# Patient Record
Sex: FEMALE | Race: BLACK OR AFRICAN AMERICAN
[De-identification: names, ages, dates, MRNs, and addresses within clinical notes are randomized per-mention and may not be internally consistent; named-entity substitution may affect disease eponyms.]

---

## 2017-01-10 ENCOUNTER — HOSPITAL ENCOUNTER (OUTPATIENT)
Dept: HOSPITAL 62 - END | Age: 72
Discharge: HOME | End: 2017-01-10
Attending: INTERNAL MEDICINE
Payer: MEDICARE

## 2017-01-10 VITALS — SYSTOLIC BLOOD PRESSURE: 128 MMHG | DIASTOLIC BLOOD PRESSURE: 67 MMHG

## 2017-01-10 DIAGNOSIS — I25.10: ICD-10-CM

## 2017-01-10 DIAGNOSIS — Z79.82: ICD-10-CM

## 2017-01-10 DIAGNOSIS — K29.50: Primary | ICD-10-CM

## 2017-01-10 DIAGNOSIS — J44.9: ICD-10-CM

## 2017-01-10 DIAGNOSIS — K44.9: ICD-10-CM

## 2017-01-10 DIAGNOSIS — I12.9: ICD-10-CM

## 2017-01-10 DIAGNOSIS — K21.9: ICD-10-CM

## 2017-01-10 DIAGNOSIS — Z88.0: ICD-10-CM

## 2017-01-10 DIAGNOSIS — Z79.899: ICD-10-CM

## 2017-01-10 DIAGNOSIS — N18.9: ICD-10-CM

## 2017-01-10 DIAGNOSIS — J45.909: ICD-10-CM

## 2017-01-10 DIAGNOSIS — Z79.51: ICD-10-CM

## 2017-01-10 DIAGNOSIS — E78.5: ICD-10-CM

## 2017-01-10 DIAGNOSIS — M19.90: ICD-10-CM

## 2017-01-10 PROCEDURE — 88342 IMHCHEM/IMCYTCHM 1ST ANTB: CPT

## 2017-01-10 PROCEDURE — 88305 TISSUE EXAM BY PATHOLOGIST: CPT

## 2017-01-10 PROCEDURE — 0DB68ZX EXCISION OF STOMACH, VIA NATURAL OR ARTIFICIAL OPENING ENDOSCOPIC, DIAGNOSTIC: ICD-10-PCS | Performed by: INTERNAL MEDICINE

## 2017-01-10 PROCEDURE — 43239 EGD BIOPSY SINGLE/MULTIPLE: CPT

## 2017-01-10 RX ADMIN — MIDAZOLAM HYDROCHLORIDE ONE MG: 1 INJECTION, SOLUTION INTRAMUSCULAR; INTRAVENOUS at 09:25

## 2017-01-10 RX ADMIN — MIDAZOLAM HYDROCHLORIDE ONE MG: 1 INJECTION, SOLUTION INTRAMUSCULAR; INTRAVENOUS at 09:29

## 2017-01-10 NOTE — OPERATIVE REPORT
Operative Report


DATE OF SURGERY: 01/10/17


Operative Report: 


The risks benefits and alternatives of the procedure explained to the patient 

in detail and informed consent is obtained that GIF Olympus video scope was 

inserted into the patient's mouth and hypopharynx the esophagus is identified 

intubated and insufflated the scope was then advanced through the esophagus 

stomach and duodenum retroflexion maneuver is done the esophagus stomach and 

first and second portions of the duodenum examined


PREOPERATIVE DIAGNOSIS: Epigastric pain


POSTOPERATIVE DIAGNOSIS: Gastritis.  Hiatal hernia


OPERATION: EGD with biopsy


SURGEON: VIRGINIA TRIMBLE


ANESTHESIA: Moderate Sedation - 3 mg of Versed, 50 g of fentanyl.


TISSUE REMOVED OR ALTERED: Gastric specimens obtained rule out Helicobacter 

pylori


COMPLICATIONS: 


None.


ESTIMATED BLOOD LOSS: none.


INTRAOPERATIVE FINDINGS: Normal esophagus.  Inflammation of the stomach.  First 

and second portions of the duodenum normal


PROCEDURE: 


Patient tolerated the procedure well.


No immediate postprocedure complications are noted.


She is discharged in good condition.


Date of discharge 01/10/2017.


Discharge diet: Regular.


Discharge activity: Regular.


Patient is instructed to go to emergency room or call the office should there 

be any further problems or questions.


2-3 week follow-up to discuss findings.


Wait on biopsies.

## 2017-02-17 ENCOUNTER — HOSPITAL ENCOUNTER (EMERGENCY)
Dept: HOSPITAL 62 - ER | Age: 72
Discharge: HOME | End: 2017-02-17
Payer: MEDICARE

## 2017-02-17 VITALS — SYSTOLIC BLOOD PRESSURE: 146 MMHG | DIASTOLIC BLOOD PRESSURE: 72 MMHG

## 2017-02-17 DIAGNOSIS — F17.210: ICD-10-CM

## 2017-02-17 DIAGNOSIS — J44.9: ICD-10-CM

## 2017-02-17 DIAGNOSIS — R05: ICD-10-CM

## 2017-02-17 DIAGNOSIS — I10: ICD-10-CM

## 2017-02-17 DIAGNOSIS — T59.811A: Primary | ICD-10-CM

## 2017-02-17 DIAGNOSIS — I25.10: ICD-10-CM

## 2017-02-17 DIAGNOSIS — I25.2: ICD-10-CM

## 2017-02-17 DIAGNOSIS — Z87.01: ICD-10-CM
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
## 2017-02-17 DIAGNOSIS — Z88.0: ICD-10-CM

## 2017-02-17 DIAGNOSIS — J70.5: ICD-10-CM

## 2017-02-17 DIAGNOSIS — R06.02: ICD-10-CM

## 2017-02-17 DIAGNOSIS — Z95.810: ICD-10-CM

## 2017-02-17 DIAGNOSIS — Z98.61: ICD-10-CM

## 2017-02-17 PROCEDURE — 99284 EMERGENCY DEPT VISIT MOD MDM: CPT

## 2017-02-17 PROCEDURE — 94640 AIRWAY INHALATION TREATMENT: CPT

## 2017-02-17 PROCEDURE — 71020: CPT
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
## 2017-05-21 ENCOUNTER — HOSPITAL ENCOUNTER (EMERGENCY)
Dept: HOSPITAL 62 - ER | Age: 72
Discharge: HOME | End: 2017-05-21
Payer: MEDICARE

## 2017-05-21 VITALS — SYSTOLIC BLOOD PRESSURE: 194 MMHG | DIASTOLIC BLOOD PRESSURE: 84 MMHG

## 2017-05-21 DIAGNOSIS — S09.90XA: Primary | ICD-10-CM
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
## 2017-05-21 DIAGNOSIS — M54.5: ICD-10-CM

## 2017-05-21 DIAGNOSIS — M25.551: ICD-10-CM

## 2017-05-21 DIAGNOSIS — M54.2: ICD-10-CM

## 2017-05-21 DIAGNOSIS — M54.9: ICD-10-CM

## 2017-05-21 PROCEDURE — 70450 CT HEAD/BRAIN W/O DYE: CPT

## 2017-05-21 PROCEDURE — 99284 EMERGENCY DEPT VISIT MOD MDM: CPT

## 2017-05-21 PROCEDURE — 73502 X-RAY EXAM HIP UNI 2-3 VIEWS: CPT

## 2017-05-21 PROCEDURE — 72110 X-RAY EXAM L-2 SPINE 4/>VWS: CPT

## 2017-05-21 PROCEDURE — 72125 CT NECK SPINE W/O DYE: CPT
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
## 2017-06-01 ENCOUNTER — HOSPITAL ENCOUNTER (OUTPATIENT)
Dept: HOSPITAL 62 - OD | Age: 72
End: 2017-06-01
Attending: FAMILY MEDICINE
Payer: MEDICARE

## 2017-06-01 DIAGNOSIS — M54.9: Primary | ICD-10-CM

## 2017-06-01 PROCEDURE — 72110 X-RAY EXAM L-2 SPINE 4/>VWS: CPT

## 2017-06-01 NOTE — RADIOLOGY REPORT (SQ)
EXAM DESCRIPTION:  LUMBAR SPINE COMPLETE



COMPLETED DATE/TIME:  6/1/2017 10:40 am



REASON FOR STUDY:  DORSALGIA,UNSPECIFIED M54.9  DORSALGIA, UNSPECIFIED



COMPARISON:  LUMBAR SPINE FILMS 5/21/2017, 5/3/2015



NUMBER OF VIEWS:  Five views including obliques.



TECHNIQUE:  AP, lateral, oblique, and sacral radiographic images acquired of the lumbar spine.



LIMITATIONS:  Artifact from electrode leads over the frontal film



FINDINGS:  MINERALIZATION: Osteopenic

SEGMENTATION: Normal.  No transitional anatomy.

ALIGNMENT: Normal.

VERTEBRAE: Maintained height.  No fracture or worrisome bone lesion.

DISCS: Preserved height.  No significant osteophytes or end plate irregularity.

POSTERIOR ELEMENTS: Very mild bilateral facet arthropathy at L5-S1

HARDWARE: None in the spine.

PARASPINAL SOFT TISSUES: Very heavily calcified abdominal aorta without calcified aortic aneurysm

PELVIS: Intact as visualized. No fractures or worrisome bone lesions. SI joints intact.

OTHER: No other significant finding.



IMPRESSION:  Osteopenia.

No acute fracture or malalignment

Mild bilateral L5-S1 facet arthropathy



TECHNICAL DOCUMENTATION:  JOB ID:  [id number]

 [employer]- All Rights Reserved

## 2017-08-04 ENCOUNTER — HOSPITAL ENCOUNTER (OUTPATIENT)
Dept: HOSPITAL 62 - OD | Age: 72
End: 2017-08-04
Attending: PHYSICIAN ASSISTANT
Payer: MEDICARE

## 2017-08-04 DIAGNOSIS — R05: Primary | ICD-10-CM

## 2017-08-04 PROCEDURE — 71020: CPT

## 2017-08-04 NOTE — RADIOLOGY REPORT (SQ)
EXAM DESCRIPTION:  CHEST PA/LATERAL



COMPLETED DATE/TIME:  8/4/2017 11:50 am



REASON FOR STUDY:  COUGH



COMPARISON:  2/17/2017



EXAM PARAMETERS:  NUMBER OF VIEWS: two views

TECHNIQUE: Digital Frontal and Lateral radiographic views of the chest acquired.

RADIATION DOSE: NA

LIMITATIONS: none



FINDINGS:  LUNGS AND PLEURA: No opacities, masses or pneumothorax. No pleural effusion.

MEDIASTINUM AND HILAR STRUCTURES: No masses or contour abnormalities.

HEART AND VASCULAR STRUCTURES: Heart normal size.  No evidence for failure.

BONES: No acute findings.

HARDWARE: Pacemaker/defibrillator.

OTHER: No other significant finding.



IMPRESSION:  NO SIGNIFICANT RADIOGRAPHIC FINDING IN THE CHEST.



TECHNICAL DOCUMENTATION:  JOB ID:  [id number]

 2011 [employer]- All Rights Reserved

## 2017-08-23 ENCOUNTER — HOSPITAL ENCOUNTER (EMERGENCY)
Dept: HOSPITAL 62 - ER | Age: 72
Discharge: HOME | End: 2017-08-23
Payer: MEDICARE

## 2017-08-23 VITALS — DIASTOLIC BLOOD PRESSURE: 67 MMHG | SYSTOLIC BLOOD PRESSURE: 136 MMHG

## 2017-08-23 DIAGNOSIS — I25.2: ICD-10-CM

## 2017-08-23 DIAGNOSIS — Z87.01: ICD-10-CM

## 2017-08-23 DIAGNOSIS — R94.31: ICD-10-CM

## 2017-08-23 DIAGNOSIS — R06.02: ICD-10-CM

## 2017-08-23 DIAGNOSIS — I25.10: ICD-10-CM

## 2017-08-23 DIAGNOSIS — F17.200: ICD-10-CM

## 2017-08-23 DIAGNOSIS — R07.9: Primary | ICD-10-CM

## 2017-08-23 DIAGNOSIS — I10: ICD-10-CM

## 2017-08-23 DIAGNOSIS — J44.9: ICD-10-CM

## 2017-08-23 DIAGNOSIS — Z95.0: ICD-10-CM

## 2017-08-23 DIAGNOSIS — Z98.890: ICD-10-CM

## 2017-08-23 DIAGNOSIS — Z98.61: ICD-10-CM

## 2017-08-23 DIAGNOSIS — Z88.0: ICD-10-CM

## 2017-08-23 LAB
ALBUMIN SERPL-MCNC: 3.8 G/DL (ref 3.5–5)
ALP SERPL-CCNC: 87 U/L (ref 38–126)
ALT SERPL-CCNC: 22 U/L (ref 9–52)
ANION GAP SERPL CALC-SCNC: 10 MMOL/L (ref 5–19)
AST SERPL-CCNC: 15 U/L (ref 14–36)
BASOPHILS # BLD AUTO: 0 10^3/UL (ref 0–0.2)
BASOPHILS NFR BLD AUTO: 0.7 % (ref 0–2)
BILIRUB DIRECT SERPL-MCNC: 0.3 MG/DL (ref 0–0.4)
BILIRUB SERPL-MCNC: 0.6 MG/DL (ref 0.2–1.3)
BUN SERPL-MCNC: 22 MG/DL (ref 7–20)
CALCIUM: 9.7 MG/DL (ref 8.4–10.2)
CHLORIDE SERPL-SCNC: 108 MMOL/L (ref 98–107)
CK MB SERPL-MCNC: 0.42 NG/ML (ref ?–4.55)
CK MB SERPL-MCNC: 0.44 NG/ML (ref ?–4.55)
CK SERPL-CCNC: 44 U/L (ref 30–135)
CO2 SERPL-SCNC: 23 MMOL/L (ref 22–30)
CREAT SERPL-MCNC: 0.94 MG/DL (ref 0.52–1.25)
EOSINOPHIL # BLD AUTO: 0 10^3/UL (ref 0–0.6)
EOSINOPHIL NFR BLD AUTO: 0 % (ref 0–6)
ERYTHROCYTE [DISTWIDTH] IN BLOOD BY AUTOMATED COUNT: 15.1 % (ref 11.5–14)
GLUCOSE SERPL-MCNC: 103 MG/DL (ref 75–110)
HCT VFR BLD CALC: 35.5 % (ref 36–47)
HGB BLD-MCNC: 11.8 G/DL (ref 12–15.5)
HGB HCT DIFFERENCE: -0.1
LYMPHOCYTES # BLD AUTO: 1.1 10^3/UL (ref 0.5–4.7)
LYMPHOCYTES NFR BLD AUTO: 20.3 % (ref 13–45)
MCH RBC QN AUTO: 30.8 PG (ref 27–33.4)
MCHC RBC AUTO-ENTMCNC: 33.2 G/DL (ref 32–36)
MCV RBC AUTO: 93 FL (ref 80–97)
MONOCYTES # BLD AUTO: 0.2 10^3/UL (ref 0.1–1.4)
MONOCYTES NFR BLD AUTO: 3.5 % (ref 3–13)
NEUTROPHILS # BLD AUTO: 4.1 10^3/UL (ref 1.7–8.2)
NEUTS SEG NFR BLD AUTO: 75.5 % (ref 42–78)
POTASSIUM SERPL-SCNC: 3.6 MMOL/L (ref 3.6–5)
PROT SERPL-MCNC: 6.4 G/DL (ref 6.3–8.2)
RBC # BLD AUTO: 3.83 10^6/UL (ref 3.72–5.28)
SODIUM SERPL-SCNC: 140.9 MMOL/L (ref 137–145)
TROPONIN I SERPL-MCNC: < 0.012 NG/ML
TROPONIN I SERPL-MCNC: < 0.012 NG/ML
WBC # BLD AUTO: 5.5 10^3/UL (ref 4–10.5)

## 2017-08-23 PROCEDURE — 36415 COLL VENOUS BLD VENIPUNCTURE: CPT

## 2017-08-23 PROCEDURE — 99285 EMERGENCY DEPT VISIT HI MDM: CPT

## 2017-08-23 PROCEDURE — 80053 COMPREHEN METABOLIC PANEL: CPT

## 2017-08-23 PROCEDURE — 93005 ELECTROCARDIOGRAM TRACING: CPT

## 2017-08-23 PROCEDURE — 84484 ASSAY OF TROPONIN QUANT: CPT

## 2017-08-23 PROCEDURE — 71010: CPT

## 2017-08-23 PROCEDURE — 82553 CREATINE MB FRACTION: CPT

## 2017-08-23 PROCEDURE — 85025 COMPLETE CBC W/AUTO DIFF WBC: CPT

## 2017-08-23 PROCEDURE — 93010 ELECTROCARDIOGRAM REPORT: CPT

## 2017-08-23 PROCEDURE — 82550 ASSAY OF CK (CPK): CPT

## 2017-08-23 NOTE — RADIOLOGY REPORT (SQ)
EXAM DESCRIPTION:  CHEST SINGLE VIEW



COMPLETED DATE/TIME:  8/23/2017 10:39 am



REASON FOR STUDY:  bed 16 cp



COMPARISON:  Two-view chest 8/4/2017, 2/17/2017, 5/21/2016



EXAM PARAMETERS:  NUMBER OF VIEWS: One view.

TECHNIQUE: Single frontal radiographic view of the chest acquired.

RADIATION DOSE: NA

LIMITATIONS: Left mid lung obscured by the pacemaker/defibrillator battery pack



FINDINGS:  LUNGS AND PLEURA: No opacities, masses or pneumothorax. No pleural effusion.

MEDIASTINUM AND HILAR STRUCTURES: No masses.  Contour normal.

HEART AND VASCULAR STRUCTURES: No gross cardiomegaly

BONES: No acute findings.

HARDWARE: Unchanged left-sided pacemaker/ defibrillator and abandoned leads.  LAD coronary stent.

OTHER: No other significant finding.



IMPRESSION:  No acute findings



TECHNICAL DOCUMENTATION:  JOB ID:  [id number]

## 2017-08-23 NOTE — ER DOCUMENT REPORT
ED Cardiac





- General


Stated Complaint: CHEST PAIN


Time Seen by Provider: 17 10:40


Notes: 





Patient was at a local gastroenterologist (Dr. Lindsey) office this morning 

undergoing a colonoscopy.  She was sedated with etomidate and propofol.  When 

she awakened from the procedure, she was complaining of pain in her upper left 

breast and shoulder, into her left neck.  She says the doctor present gave her 

2 sublingual nitroglycerin and they called EMS.  She says the nitroglycerin 

helped some, although she still has some chest pain at this time.  Patient also 

feels a little bit short of breath.  Patient has a history of a heart attack in 

 and received a pacemaker at that time.  She still has the pacemaker, the 

most recently moved about 5 years ago.  Denies any recent illness or chest 

pains.  Denies any nausea or vomiting.  Denies fever.





PMH: Hypertension, coronary artery disease, hysterectomy, smoker.


Patient of Dr. Caballero.


TRAVEL OUTSIDE OF THE [place] IN LAST 30 DAYS: No





- Related Data


Allergies/Adverse Reactions: 


 





amoxicillin [Amoxicillin] Allergy (Intermediate, Verified 17 16:46)


 Hallucinations


Penicillins Allergy (Intermediate, Verified 17 16:46)


 Hallucinations











Past Medical History





- Social History


Smoking Status: Current Every Day Smoker


Family History: Reviewed & Not Pertinent, Hypertension





- Past Medical History


Cardiac Medical History: Reports: Hx Congestive Heart Failure, Hx Coronary 

Artery Disease, Hx Heart Attack - , Hx Hypercholesterolemia, Hx 

Hypertension - medicated


Pulmonary Medical History: Reports: Hx Bronchitis, Hx COPD, Hx Pneumonia


Renal/ Medical History: Reports: Hx Renal Insufficiency


Malignancy Medical History: Denies: Hx Leukemia, Hx Lung Cancer


GI Medical History: Reports: Hx Crohn's Disease, Hx Diverticulitis, Hx 

Gastroesophageal Reflux Disease


Musculoskeltal Medical History: Reports Hx Arthritis


Psychiatric Medical History: Reports: Hx Depression


Traumatic Medical History: Reports: Hx Fractures - right wrist, left foot


Past Surgical History: Reports: Hx Appendectomy, Hx Cardiac Catheterization, Hx 

Cardiac Surgery - AICD, Hx  Section - x4, Hx Coronary Stent, Hx 

Hysterectomy, Hx Orthopedic Surgery - bilateral knee surgery, Hx Pacemaker, Hx 

Tonsillectomy





- Immunizations


Immunizations up to date: Yes


Hx Diphtheria, Pertussis, Tetanus Vaccination: Yes


Hx Pneumococcal Vaccination: 01/01/10





Review of Systems





- Review of Systems


Notes: 





REVIEW OF SYSTEMS:


CONSTITUTIONAL :  Denies fever.  


EENT:   Denies eye, ear, nose or mouth or throat pain or other symptoms.


CARDIOVASCULAR: See HPI.


RESPIRATORY:  Denies cough, chest congestion, but did feel some shortness of 

breath.


GASTROINTESTINAL:  Denies abdominal pain or nausea, vomiting, or diarrhea (

except for the GI prep for the colonoscopy).


GENITOURINARY:  Denies difficulty or painful urinating, urinary frequency, 

blood in urine.


MUSCULOSKELETAL:  Denies back or neck pain.  Denies joint pain or swelling.


SKIN:   Denies rash or skin lesions.


NEUROLOGICAL:  Denies LOC or altered mental status.  Denies headache.  Denies 

sensory loss or motor deficits.





ALL OTHER SYSTEMS REVIEWED AND NEGATIVE.





Physical Exam





- Vital signs


Vitals: 


 











Resp Pulse Ox


 


 17   97 


 


 17 10:25  17 10:25











Interpretation: Normal, Hypertensive - 164/70





- Notes


Notes: 





PHYSICAL EXAMINATION:





GENERAL: Well-appearing, in no acute distress.  Vital signs are normal except 

for the blood pressure 164/70.





HEAD: Atraumatic, normocephalic.





NECK: Normal range of motion, supple.





LUNGS: Breath sounds clear and equal bilaterally.  Pacemaker in upper left 

chest.  Mild tenderness around the pacer.





HEART: Regular rate and rhythm without murmurs.





ABDOMEN: Soft, nontender.  No guarding or rebound.





BACK:  No tenderness throughout entire back.





EXTREMITIES: Normal range of motion without pain.





NEUROLOGICAL:  Normal speech, normal gait.  Normal sensory, motor, and reflex 

exams.  Awake, alert, and oriented x3.  Cranial nerves normal.





PSYCH: Normal mood, normal affect.





SKIN: Warm, dry, no rashes.





Course





- Re-evaluation


Re-evalutation: 





17 13:30


Patient was given a 0.2 mg nitro patch and says it helped her pain and she is 

down to barely noticeable discomfort now.








17 13:38


Discussed patient with Dr. Caballero, her primary care physician, and he says this 

patient has frequent chest pain, that always has a normal workup and no heart 

injury.  He requests a second set of cardiac markers and dispo to follow up 

with him in the office unless there is a change in her second set of labs.  

Patient advised.  Patient is hungry and we have gotten her lunch and she is 

eating it now.








17 17:34


Repeat labs show no change in the patient's CK-MB and troponin levels.  She is 

asymptomatic at this time.  I feel she is safe to be discharged home to follow-

up with Dr. Caballero.





- Vital Signs


Vital signs: 


 











Temp Pulse Resp BP Pulse Ox


 


       14   164/70 H  98 


 


       17 11:00  17 10:26  17 11:00














- Laboratory


Result Diagrams: 


 17 11:20





 17 11:20


Laboratory results interpreted by me: 


 











  17





  11:20 11:20


 


Hgb  11.8 L 


 


Hct  35.5 L 


 


RDW  15.1 H 


 


Chloride   108 H


 


BUN   22 H


 


Est GFR (Non-Af Amer)   58 L














- Diagnostic Test


Radiology results interpreted by me: 





17 13:27


Chest x-ray shows a pacemaker.  Otherwise, chest x-ray is essentially normal.





- EKG Interpretation by Me


EKG shows normal: Sinus rhythm


Rate: Normal


Rhythm: NSR


Additional EKG results interpreted by me: 





17 13:27


EKG shows an abnormal T-wave in what looks like QT prolongation.  No acute 

changes, otherwise.





Discharge





- Discharge


Clinical Impression: 


Chest pain


Qualifiers:


 Chest pain type: unspecified Qualified Code(s): R07.9 - Chest pain, unspecified





Condition: Stable


Disposition: HOME, SELF-CARE


Additional Instructions: 


CHEST PAIN OF UNCLEAR CAUSE:





     The exact cause of your chest pain isn't clear.  Fortunately, there is no 

evidence of a dangerous medical condition.  Further testing may be required to 

find the source of the pain.


     Most often, we find that this pain is coming from the chest wall -- the 

muscles or rib joints in the chest.  But chest pain can come from the lung and 

lung lining, the esophagus, the heart valves or heart lining, and even the 

stomach or gallbladder.


     Rest.  Eat lightly until the pain is gone.  We may prescribe medicine for 

pain and inflammation.


     You should call the physician immediately if the pain radiates to the 

shoulder, jaw or arms; if you start to run a fever or develop a cough; or if 

you develop shortness of breath, or other new or alarming symptoms.








NORMAL EXAM AND WORKUP:


     At this time, your examination and workup show no significant abnormality.

  No significant abnormal physical findings were noted.  All laboratory, EKG, 

and imaging (x-ray, CT scans, ultrasound) studies that were ordered show no 

significant abnormality.


     Although your examination and all studies that were ordered showed no 

significant abnormal finding, there are no examinations and no studies that are 

100% accurate.  There is always the possibility that some abnormality could 

exist and not be detected with physical examination or within the limits and 

capabilities of laboratory and other studies.


     You should return or follow up as you were instructed on your visit today 

for further evaluation if your symptoms do not resolve.








FOLLOW-UP CARE:


If you have been referred to a physician for follow-up care, call the physician

s office for an appointment as you were instructed or within the next two days.

  If you experience worsening or a significant change in your symptoms, notify 

the physician immediately or return to the Emergency Department at any time for 

re-evaluation.





Follow-up with Dr. Caballero in his office tomorrow.


Referrals: 


LAZARO CABALLERO MD [Primary Care Provider] - Follow up as needed

## 2017-08-23 NOTE — EKG REPORT
SEVERITY:- ABNORMAL ECG -

SINUS RHYTHM

ABNORMAL T, CONSIDER ISCHEMIA, ANT-LAT LEADS

:

Confirmed by: Lety Pablo 23-Aug-2017 21:40:55

## 2017-10-08 ENCOUNTER — HOSPITAL ENCOUNTER (INPATIENT)
Dept: HOSPITAL 62 - ER | Age: 72
LOS: 3 days | Discharge: HOME | DRG: 378 | End: 2017-10-11
Attending: FAMILY MEDICINE | Admitting: FAMILY MEDICINE
Payer: MEDICARE

## 2017-10-08 DIAGNOSIS — I25.10: ICD-10-CM

## 2017-10-08 DIAGNOSIS — Z88.0: ICD-10-CM

## 2017-10-08 DIAGNOSIS — J44.9: ICD-10-CM

## 2017-10-08 DIAGNOSIS — I25.2: ICD-10-CM

## 2017-10-08 DIAGNOSIS — M19.90: ICD-10-CM

## 2017-10-08 DIAGNOSIS — D62: ICD-10-CM

## 2017-10-08 DIAGNOSIS — R51: ICD-10-CM

## 2017-10-08 DIAGNOSIS — I11.0: ICD-10-CM

## 2017-10-08 DIAGNOSIS — Z79.82: ICD-10-CM

## 2017-10-08 DIAGNOSIS — E78.5: ICD-10-CM

## 2017-10-08 DIAGNOSIS — Z88.1: ICD-10-CM

## 2017-10-08 DIAGNOSIS — F17.210: ICD-10-CM

## 2017-10-08 DIAGNOSIS — Z79.51: ICD-10-CM

## 2017-10-08 DIAGNOSIS — G89.4: ICD-10-CM

## 2017-10-08 DIAGNOSIS — Z79.899: ICD-10-CM

## 2017-10-08 DIAGNOSIS — K21.9: ICD-10-CM

## 2017-10-08 DIAGNOSIS — Z95.810: ICD-10-CM

## 2017-10-08 DIAGNOSIS — K55.21: Primary | ICD-10-CM

## 2017-10-08 DIAGNOSIS — I50.9: ICD-10-CM

## 2017-10-08 PROCEDURE — 85027 COMPLETE CBC AUTOMATED: CPT

## 2017-10-08 PROCEDURE — 84484 ASSAY OF TROPONIN QUANT: CPT

## 2017-10-08 PROCEDURE — 93010 ELECTROCARDIOGRAM REPORT: CPT

## 2017-10-08 PROCEDURE — 81001 URINALYSIS AUTO W/SCOPE: CPT

## 2017-10-08 PROCEDURE — 86920 COMPATIBILITY TEST SPIN: CPT

## 2017-10-08 PROCEDURE — 36415 COLL VENOUS BLD VENIPUNCTURE: CPT

## 2017-10-08 PROCEDURE — 85610 PROTHROMBIN TIME: CPT

## 2017-10-08 PROCEDURE — 45378 DIAGNOSTIC COLONOSCOPY: CPT

## 2017-10-08 PROCEDURE — 85730 THROMBOPLASTIN TIME PARTIAL: CPT

## 2017-10-08 PROCEDURE — 70450 CT HEAD/BRAIN W/O DYE: CPT

## 2017-10-08 PROCEDURE — 80053 COMPREHEN METABOLIC PANEL: CPT

## 2017-10-08 PROCEDURE — 80048 BASIC METABOLIC PNL TOTAL CA: CPT

## 2017-10-08 PROCEDURE — 85025 COMPLETE CBC W/AUTO DIFF WBC: CPT

## 2017-10-08 PROCEDURE — P9016 RBC LEUKOCYTES REDUCED: HCPCS

## 2017-10-08 PROCEDURE — 86901 BLOOD TYPING SEROLOGIC RH(D): CPT

## 2017-10-08 PROCEDURE — 86850 RBC ANTIBODY SCREEN: CPT

## 2017-10-08 PROCEDURE — 94640 AIRWAY INHALATION TREATMENT: CPT

## 2017-10-08 PROCEDURE — 86900 BLOOD TYPING SEROLOGIC ABO: CPT

## 2017-10-08 PROCEDURE — S0164 INJECTION PANTROPRAZOLE: HCPCS

## 2017-10-08 PROCEDURE — 82550 ASSAY OF CK (CPK): CPT

## 2017-10-08 PROCEDURE — 43235 EGD DIAGNOSTIC BRUSH WASH: CPT

## 2017-10-08 PROCEDURE — 93005 ELECTROCARDIOGRAM TRACING: CPT

## 2017-10-08 PROCEDURE — 87070 CULTURE OTHR SPECIMN AEROBIC: CPT

## 2017-10-08 PROCEDURE — 87880 STREP A ASSAY W/OPTIC: CPT

## 2017-10-08 PROCEDURE — 99285 EMERGENCY DEPT VISIT HI MDM: CPT

## 2017-10-08 PROCEDURE — 36430 TRANSFUSION BLD/BLD COMPNT: CPT

## 2017-10-09 LAB
ALBUMIN SERPL-MCNC: 3.9 G/DL (ref 3.5–5)
ALP SERPL-CCNC: 93 U/L (ref 38–126)
ALT SERPL-CCNC: 18 U/L (ref 9–52)
ANION GAP SERPL CALC-SCNC: 9 MMOL/L (ref 5–19)
APPEARANCE UR: CLEAR
APTT BLD: 29.9 SEC (ref 23.5–35.8)
AST SERPL-CCNC: 55 U/L (ref 14–36)
BASOPHILS # BLD AUTO: 0 10^3/UL (ref 0–0.2)
BASOPHILS NFR BLD AUTO: 0.5 % (ref 0–2)
BILIRUB DIRECT SERPL-MCNC: 0.4 MG/DL (ref 0–0.4)
BILIRUB SERPL-MCNC: 0.6 MG/DL (ref 0.2–1.3)
BILIRUB UR QL STRIP: NEGATIVE
BUN SERPL-MCNC: 21 MG/DL (ref 7–20)
CALCIUM: 9.4 MG/DL (ref 8.4–10.2)
CHLORIDE SERPL-SCNC: 112 MMOL/L (ref 98–107)
CK SERPL-CCNC: 83 U/L (ref 30–135)
CO2 SERPL-SCNC: 24 MMOL/L (ref 22–30)
CREAT SERPL-MCNC: 1.08 MG/DL (ref 0.52–1.25)
EOSINOPHIL # BLD AUTO: 0 10^3/UL (ref 0–0.6)
EOSINOPHIL NFR BLD AUTO: 1.3 % (ref 0–6)
ERYTHROCYTE [DISTWIDTH] IN BLOOD BY AUTOMATED COUNT: 15.5 % (ref 11.5–14)
ERYTHROCYTE [DISTWIDTH] IN BLOOD BY AUTOMATED COUNT: 15.7 % (ref 11.5–14)
GLUCOSE SERPL-MCNC: 125 MG/DL (ref 75–110)
GLUCOSE UR STRIP-MCNC: NEGATIVE MG/DL
HCT VFR BLD CALC: 24.1 % (ref 36–47)
HCT VFR BLD CALC: 24.6 % (ref 36–47)
HCT VFR BLD CALC: 27.1 % (ref 36–47)
HCT VFR BLD CALC: 35 % (ref 36–47)
HGB BLD-MCNC: 11.5 G/DL (ref 12–15.5)
HGB BLD-MCNC: 8.1 G/DL (ref 12–15.5)
HGB BLD-MCNC: 8.1 G/DL (ref 12–15.5)
HGB BLD-MCNC: 9 G/DL (ref 12–15.5)
HGB HCT DIFFERENCE: -0.1
HGB HCT DIFFERENCE: -0.3
HGB HCT DIFFERENCE: -0.5
HGB HCT DIFFERENCE: 0.2
KETONES UR STRIP-MCNC: NEGATIVE MG/DL
LYMPHOCYTES # BLD AUTO: 1.9 10^3/UL (ref 0.5–4.7)
LYMPHOCYTES NFR BLD AUTO: 58.6 % (ref 13–45)
MCH RBC QN AUTO: 30.8 PG (ref 27–33.4)
MCH RBC QN AUTO: 30.8 PG (ref 27–33.4)
MCH RBC QN AUTO: 31 PG (ref 27–33.4)
MCH RBC QN AUTO: 31.6 PG (ref 27–33.4)
MCHC RBC AUTO-ENTMCNC: 32.9 G/DL (ref 32–36)
MCHC RBC AUTO-ENTMCNC: 32.9 G/DL (ref 32–36)
MCHC RBC AUTO-ENTMCNC: 33.1 G/DL (ref 32–36)
MCHC RBC AUTO-ENTMCNC: 33.6 G/DL (ref 32–36)
MCV RBC AUTO: 94 FL (ref 80–97)
MONOCYTES # BLD AUTO: 0.1 10^3/UL (ref 0.1–1.4)
MONOCYTES NFR BLD AUTO: 4 % (ref 3–13)
NEUTROPHILS # BLD AUTO: 1.2 10^3/UL (ref 1.7–8.2)
NEUTS SEG NFR BLD AUTO: 35.6 % (ref 42–78)
NITRITE UR QL STRIP: NEGATIVE
PH UR STRIP: 5 [PH] (ref 5–9)
POTASSIUM SERPL-SCNC: 3.8 MMOL/L (ref 3.6–5)
PROT SERPL-MCNC: 6.6 G/DL (ref 6.3–8.2)
PROT UR STRIP-MCNC: NEGATIVE MG/DL
PROTHROMBIN TIME: 14.2 SEC (ref 11.4–15.4)
RBC # BLD AUTO: 2.56 10^6/UL (ref 3.72–5.28)
RBC # BLD AUTO: 2.63 10^6/UL (ref 3.72–5.28)
RBC # BLD AUTO: 2.89 10^6/UL (ref 3.72–5.28)
RBC # BLD AUTO: 3.73 10^6/UL (ref 3.72–5.28)
SODIUM SERPL-SCNC: 145.4 MMOL/L (ref 137–145)
SP GR UR STRIP: 1.02
UROBILINOGEN UR-MCNC: NEGATIVE MG/DL (ref ?–2)
WBC # BLD AUTO: 3 10^3/UL (ref 4–10.5)
WBC # BLD AUTO: 3.2 10^3/UL (ref 4–10.5)
WBC # BLD AUTO: 3.6 10^3/UL (ref 4–10.5)
WBC # BLD AUTO: 3.7 10^3/UL (ref 4–10.5)

## 2017-10-09 PROCEDURE — 0DJ08ZZ INSPECTION OF UPPER INTESTINAL TRACT, VIA NATURAL OR ARTIFICIAL OPENING ENDOSCOPIC: ICD-10-PCS | Performed by: SURGERY

## 2017-10-09 PROCEDURE — 0DJD8ZZ INSPECTION OF LOWER INTESTINAL TRACT, VIA NATURAL OR ARTIFICIAL OPENING ENDOSCOPIC: ICD-10-PCS | Performed by: SURGERY

## 2017-10-09 RX ADMIN — ATORVASTATIN CALCIUM SCH MG: 40 TABLET, FILM COATED ORAL at 23:08

## 2017-10-09 RX ADMIN — BUSPIRONE HYDROCHLORIDE SCH MG: 10 TABLET ORAL at 23:08

## 2017-10-09 RX ADMIN — HYDRALAZINE HYDROCHLORIDE SCH MG: 25 TABLET, FILM COATED ORAL at 13:37

## 2017-10-09 RX ADMIN — PANTOPRAZOLE SODIUM SCH MG: 40 INJECTION, POWDER, FOR SOLUTION INTRAVENOUS at 10:16

## 2017-10-09 RX ADMIN — FENTANYL CITRATE ONE MCG: 50 INJECTION INTRAMUSCULAR; INTRAVENOUS at 17:23

## 2017-10-09 RX ADMIN — FENTANYL CITRATE ONE MCG: 50 INJECTION INTRAMUSCULAR; INTRAVENOUS at 17:30

## 2017-10-09 RX ADMIN — AMLODIPINE BESYLATE SCH MG: 5 TABLET ORAL at 23:09

## 2017-10-09 RX ADMIN — BUDESONIDE AND FORMOTEROL FUMARATE DIHYDRATE SCH PUFF: 160; 4.5 AEROSOL RESPIRATORY (INHALATION) at 23:08

## 2017-10-09 RX ADMIN — BUSPIRONE HYDROCHLORIDE SCH MG: 10 TABLET ORAL at 13:44

## 2017-10-09 RX ADMIN — PANTOPRAZOLE SODIUM SCH MG: 40 INJECTION, POWDER, FOR SOLUTION INTRAVENOUS at 23:09

## 2017-10-09 RX ADMIN — MIDAZOLAM HYDROCHLORIDE ONE MG: 1 INJECTION, SOLUTION INTRAMUSCULAR; INTRAVENOUS at 17:28

## 2017-10-09 RX ADMIN — MIDAZOLAM HYDROCHLORIDE ONE MG: 1 INJECTION, SOLUTION INTRAMUSCULAR; INTRAVENOUS at 17:21

## 2017-10-09 RX ADMIN — CARVEDILOL SCH MG: 12.5 TABLET, FILM COATED ORAL at 23:09

## 2017-10-09 RX ADMIN — MIDAZOLAM HYDROCHLORIDE ONE MG: 1 INJECTION, SOLUTION INTRAMUSCULAR; INTRAVENOUS at 17:40

## 2017-10-09 RX ADMIN — FLUTICASONE PROPIONATE SCH SPRAY: 50 SPRAY, METERED NASAL at 19:09

## 2017-10-09 RX ADMIN — HYDRALAZINE HYDROCHLORIDE SCH MG: 25 TABLET, FILM COATED ORAL at 23:08

## 2017-10-09 RX ADMIN — LATANOPROST SCH DROP: 50 SOLUTION OPHTHALMIC at 23:12

## 2017-10-09 RX ADMIN — RANOLAZINE SCH MG: 500 TABLET, FILM COATED, EXTENDED RELEASE ORAL at 23:05

## 2017-10-09 NOTE — PDOC H&P
History of Present Illness


Admission Date/PCP: 


  10/09/17 07:08





  BISMARK CABALLERO MD





Patient complains of: Blood in the rectum


History of Present Illness: 


JAYCEE FLOWERS is a 72 year old female presents to the emergency department by 

ground rescue complaining of abdominal, gastrointestinal bleeding.  She was 

admitted to the internal medicine service for further evaluation.  She had no 

evidence of hemodynamic instability.  Bowel movements are described as bloody 

some clot.





Patient has a history of repeated gastrointestinal bleeding.  According to 

records, she underwent upper and lower endoscopy by Dr. Cantor  where she 

was found to have duodenitis, ileocolitis and 2 polyps of the ascending colon.  

She underwent repeat upper endoscopy by Dr. Almeida was found to have gastritis 

and duodenitis in .  Significant source of gastrointestinal bleeding was 

identified.  Last month the patient states she underwent upper and lower 

endoscopy by Dr. eason, the results of which are unknown.





According the patient the only blood thinner she takes his aspirin.  Since 

being admitted to the floor, she has had no further gastrointestinal bleeding.  

She denies emesis.


Patient also have a history of the coronary artery disease status post stent 

placement and a history of the ventricular tachycardia status post ICD 

placement and currently see a [facility] and currently all stable


Patient's still have a blood in the stools


pt was diagnosed one time of the Crohn's disease but the doctor ivanna rivera told 

the patient patient does not have any Crohn's disease


Patient's son is on the bedside and discussed with the . Maco Was going to 

perform the endoscopy and colonoscopy today








Past Medical History


Cardiac Medical History: Reports: Congestive Heart Failure, Coronary Artery 

Disease, Myocardial Infarction - , Hyperlipidema, Hypertension - medicated


   Denies: Atrial Fibrillation, Peripheral Vascular Disease, Pulmonary Embolism

, Heart Murmur


Pulmonary Medical History: Reports: Bronchitis, Chronic Obstructive Pulmonary 

Disease (COPD), Pneumonia


   Denies: Asthma, Respiratory Failure, Sleep Apnea, Tuberculosis


Neurological Medical History: 


   Denies: Seizures


Endocrine Medical History: 


   Denies: Diabetes Mellitus Type 1, Diabetes Mellitus Type 2


Renal/ Medical History: 


   Denies: End Stage Renal Disease


Malignancy Medical History: 


   Denies: Leukemia, Lung Cancer


GI Medical History: Reports: Crohn's Disease - Patient has either Crohn's 

disease or ulcerative colitis, Diverticulitis, Gastroesophageal Reflux Disease


   Denies: Hepatitis, Hiatal Hernia


Musculoskeltal Medical History: Reports: Arthritis


   Denies: Fibromyalgia


Psychiatric Medical History: Reports: Depression


   Denies: Bipolar Disorder, Post Traumatic Stress Disorder


Hematology: 


   Denies: Anemia, Hemophilia, Sickle Cell Disease


Infectious Medical History: 


   Denies: HIV





Past Surgical History


Past Surgical History: Reports: Appendectomy, Cardiac Catheterization,  

Section - x4, Coronary Stent, Hysterectomy, Orthopedic Surgery - bilateral knee 

surgery, Pacemaker, Tonsillectomy


   Denies: Amputation, Cholecystectomy, Colostomy, Coronary Artery Bypass Graft

, Gastric Bypass Surgery, Herniorrhaphy, Mastectomy, Tubal Ligation





Social History


Lives with: Family


Smoking Status: Current Some Day Smoker


Frequency of Alcohol Use: None


Hx Recreational Drug Use: No


Hx Prescription Drug Abuse: No





- Advance Directive


Resuscitation Status: Full Code





Family History


Family History: Reviewed & Not Pertinent, Hypertension


Parental Family History Reviewed: Yes


Children Family History Reviewed: Yes


Sibling(s) Family History Reviewed.: Yes





Medication/Allergy


Home Medications: 








Albuterol Sulfate [Albuterol Sulfate 2.5mg/3 mL] 1 vial IH TID 10/09/17 


Alprazolam [Xanax 0.5 mg Tablet] 0.5 mg PO BIDP PRN 10/09/17 


Amlodipine Besylate [Norvasc 5 mg Tablet] 5 mg PO Q12 10/09/17 


Aspirin [Aspirin EC] 81 mg PO DAILY 10/09/17 


Atorvastatin Calcium [Lipitor 40 mg Tablet] 40 mg PO QHS 10/09/17 


Budesonide/Formoterol Fumarate [Symbicort Hfa 160-4.5 Mcg Inhaler 6 gm] 2 puff 

IH Q12 10/09/17 


Buspirone HCl [Buspar 10 mg Tablet] 10 mg PO Q8 10/09/17 


Carvedilol [Coreg 25 mg Tablet] 1 tab PO Q12 10/09/17 


Diclofenac Sodium [Voltaren] 100 gm TP QIDP PRN 10/09/17 


Escitalopram Oxalate [Lexapro 10 mg Tablet] 10 mg PO DAILY 10/09/17 


Fluticasone Propionate [Flonase Nasal Spray 50 Mcg/Spray 16 gm] 1 spray NASL 

BID 10/09/17 


Gabapentin [Neurontin 300 mg Capsule] 300 mg PO BIDP PRN 10/09/17 


Hydralazine HCl [Apresoline 25 mg Tablet] 50 mg PO Q8 10/09/17 


Hydrochlorothiazide [Hydrodiuril 12.5 mg Capsule] 12.5 mg PO DAILY 10/09/17 


Ketorolac Tromethamine [Acular Ls] 1 drop OS TIDP PRN 10/09/17 


Latanoprost [Xalatan 0.005% Oph Soln 2.5 ml] 1 drop OD QHS 10/09/17 


Nitroglycerin [Nitrostat 0.4 mg (1/150 Gr) Tabs 25/Bottle] 1 tab SL Q5MP PRN 10/

09/17 


Omeprazole 40 mg PO QAM 10/09/17 


Ranitidine HCl [Zantac 150 mg Tablet] 75 mg PO BID 10/09/17 


Ranolazine [Ranexa 500 mg Tab.sr] 500 mg PO Q12 10/09/17 


Tiotropium Bromide [Spiriva Respimat] 2 puff IH DAILY 10/09/17 








Allergies/Adverse Reactions: 


 





amoxicillin [Amoxicillin] Allergy (Intermediate, Verified 17 16:46)


 Hallucinations


Penicillins Allergy (Intermediate, Verified 17 16:46)


 Hallucinations











Review of Systems


Constitutional: ABSENT: chills, fever(s), headache(s), weight gain, weight loss


Eyes: ABSENT: visual disturbances


Ears: ABSENT: hearing changes


Cardiovascular: ABSENT: chest pain, dyspnea on exertion, edema, orthropnea, 

palpitations


Respiratory: ABSENT: cough, hemoptysis


Gastrointestinal: PRESENT: bloating, hematochezia.  ABSENT: abdominal pain, 

constipation, diarrhea, hematemesis, nausea, vomiting


Genitourinary: ABSENT: dysuria, hematuria


Musculoskeletal: ABSENT: joint swelling


Integumentary: ABSENT: rash, wounds


Neurological: ABSENT: abnormal gait, abnormal speech, confusion, dizziness, 

focal weakness, syncope


Psychiatric: ABSENT: anxiety, depression, homidical ideation, suicidal ideation


Endocrine: ABSENT: cold intolerance, heat intolerance, menstrual abnormalities, 

polydipsia, polyuria


Hematologic/Lymphatic: ABSENT: easy bleeding, easy bruising, lymphadenopathy





Physical Exam


Vital Signs: 


 











Temp Pulse Resp BP Pulse Ox


 


 97.4 F   74   17   192/82 H  97 


 


 10/09/17 11:00  10/09/17 13:31  10/09/17 11:00  10/09/17 13:31  10/09/17 13:31











General appearance: PRESENT: no acute distress, well-developed, well-nourished


Head exam: PRESENT: atraumatic, normocephalic


Eye exam: PRESENT: conjunctiva pink, EOMI, PERRLA.  ABSENT: scleral icterus


Ear exam: PRESENT: normal external ear exam


Mouth exam: PRESENT: moist, tongue midline


Neck exam: PRESENT: full ROM.  ABSENT: carotid bruit, JVD, lymphadenopathy, 

thyromegaly


Respiratory exam: PRESENT: clear to auscultation ximena


Cardiovascular exam: PRESENT: RRR.  ABSENT: diastolic murmur, rubs, systolic 

murmur


Pulses: PRESENT: normal dorsalis pedis pul, +2 pedal pulses bilateral


Vascular exam: PRESENT: normal capillary refill


GI/Abdominal exam: PRESENT: normal bowel sounds, soft.  ABSENT: distended, 

guarding, mass, organolmegaly, rebound, tenderness


Rectal exam: PRESENT: deferred


Extremities exam: PRESENT: full ROM.  ABSENT: pedal edema


Musculoskeletal exam: PRESENT: ambulatory


Neurological exam: PRESENT: alert, awake, oriented to person, oriented to place

, oriented to time, oriented to situation, CN II-XII grossly intact.  ABSENT: 

motor sensory deficit


Psychiatric exam: PRESENT: appropriate affect, normal mood.  ABSENT: homicidal 

ideation, suicidal ideation


Skin exam: PRESENT: dry, intact, warm.  ABSENT: cyanosis, rash





Results


Laboratory Results: 


 





 10/09/17 08:05 





 











  10/09/17 10/09/17





  08:05 08:25


 


WBC  3.0 L 


 


RBC  2.89 L 


 


Hgb  9.0 L D 


 


Hct  27.1 L 


 


MCV  94 


 


MCH  31.0 


 


MCHC  33.1 


 


RDW  15.7 H 


 


Plt Count  158 


 


Urine Color   YELLOW


 


Urine Appearance   CLEAR


 


Urine pH   5.0


 


Ur Specific Gravity   1.016


 


Urine Protein   NEGATIVE


 


Urine Glucose (UA)   NEGATIVE


 


Urine Ketones   NEGATIVE


 


Urine Blood   SMALL H


 


Urine Nitrite   NEGATIVE


 


Ur Leukocyte Esterase   NEGATIVE


 


Urine WBC (Auto)   1


 


Urine RBC (Auto)   0














Assessment & Plan





- Diagnosis


(1) Rectal bleeding


Is this a current diagnosis for this admission?: Yes   


Plan: 


Patient is going for the endoscopy and colonoscopy for the general surgery will 

continues IV fluid hold aspirin and continues to check the H&H every 6 hour








(2) AICD (automatic cardioverter/defibrillator) present


Is this a current diagnosis for this admission?: Yes   


Plan: 


Currently stable








(3) COPD (chronic obstructive pulmonary disease)


Qualifiers: 


   COPD type: chronic bronchitis   Qualified Code(s): J44.9 - Chronic 

obstructive pulmonary disease, unspecified   


Is this a current diagnosis for this admission?: Yes   


Plan: 


Continues DuoNeb nebulizer








(4) HTN (hypertension)


Qualifiers: 


   Hypertension type: essential hypertension   Qualified Code(s): I10 - 

Essential (primary) hypertension   


Is this a current diagnosis for this admission?: Yes   


Plan: 


Continues current medications








(5) Coronary artery disease


Qualifiers: 


   Coronary Disease-Associated Artery/Lesion type: unspecified vessel or lesion 

type   Associated angina: without angina 


Is this a current diagnosis for this admission?: Yes   


Plan: 


Patient usually see a [facility] and recently increased the Ranexa and 

suggest the medical management and if is persistent any issue with the heart 

Need to reevaluate the RCA


Patient have a cardiac cath done in May 2017 was LAD was all stable with some 

lesion in the RAD and suggest the medical management








(6) GERD (gastroesophageal reflux disease)


Qualifiers: 


   Esophagitis presence: without esophagitis   Qualified Code(s): K21.9 - Gastro

-esophageal reflux disease without esophagitis   


Is this a current diagnosis for this admission?: Yes   


Plan: 


Put the patient on the Protonix








(7) HLD (hyperlipidemia)


Qualifiers: 


   Hyperlipidemia type: unspecified   Qualified Code(s): E78.5 - Hyperlipidemia

, unspecified   


Is this a current diagnosis for this admission?: Yes   


Plan: 


Continues to statin








(8) Osteoarthritis


Qualifiers: 


   Osteoarthritis location: unspecified site 


Is this a current diagnosis for this admission?: Yes   





(9) Chronic pain syndrome


Is this a current diagnosis for this admission?: Yes   


Plan: 


Patient usually see a pain management








- Time


Time Spent: 50 to 70 Minutes


Medications reviewed and adjusted accordingly: Yes


Anticipated discharge: Home


Within: Other





- Inpatient Certification


Medical Necessity: Need Close Monitoring Due to Risk of Patient Decompensation, 

Need For IV Fluids


Post Hospital Care: D/C Planner Documentation





- Plan Summary


Plan Summary: 





Continues to current medications continues to the patient n.p.o. follow with 

the general surgery for endoscopy and colonoscopy check H&H every 6 hours and 

discussed with the patient and the son on the bedside and other coordinate care

## 2017-10-09 NOTE — PDOC CONSULTATION
Consultation


Consult Date: 10/09/17


Consult reason:: Gastrointestinal bleeding





History of Present Illness


Admission Date/PCP: 


  10/09/17 07:08





  BISMARK CABALLERO MD





History of Present Illness: 


JAYCEE FLOWERS is a 72 year old female presents to the emergency department by 

ground rescue complaining of abdominal, gastrointestinal bleeding.  She was 

admitted to the internal medicine service for further evaluation.  She had no 

evidence of hemodynamic instability.  Bowel movements are described as bloody 

some clot.





Patient has a history of repeated gastrointestinal bleeding.  According to 

records, she underwent upper and lower endoscopy by Dr. Cantor  where she 

was found to have duodenitis, ileocolitis and 2 polyps of the ascending colon.  

She underwent repeat upper endoscopy by Dr. Almeida was found to have gastritis 

and duodenitis in .  Significant source of gastrointestinal bleeding was 

identified.  Last month the patient states she underwent upper and lower 

endoscopy by Dr. eason, the results of which are unknown.





According the patient the only blood thinner she takes his aspirin.  Since 

being admitted to the floor, she has had no further gastrointestinal bleeding.  

She denies emesis.








Past Medical History


Cardiac Medical History: Reports: Congestive Heart Failure, Coronary Artery 

Disease, Myocardial Infarction - , Hyperlipidema, Hypertension - medicated


   Denies: Atrial Fibrillation, Peripheral Vascular Disease, Pulmonary Embolism

, Heart Murmur


Pulmonary Medical History: Reports: Bronchitis, Chronic Obstructive Pulmonary 

Disease (COPD), Pneumonia


   Denies: Asthma, Respiratory Failure, Sleep Apnea, Tuberculosis


Neurological Medical History: 


   Denies: Seizures


Endocrine Medical History: 


   Denies: Diabetes Mellitus Type 1, Diabetes Mellitus Type 2


Renal/ Medical History: 


   Denies: End Stage Renal Disease


Malignancy Medical History: 


   Denies: Leukemia, Lung Cancer


GI Medical History: Reports: Crohn's Disease - Patient has either Crohn's 

disease or ulcerative colitis, Diverticulitis, Gastroesophageal Reflux Disease


   Denies: Hepatitis, Hiatal Hernia


Musculoskeltal Medical History: Reports: Arthritis


   Denies: Fibromyalgia


Psychiatric Medical History: Reports: Depression


   Denies: Bipolar Disorder, Post Traumatic Stress Disorder


Hematology: 


   Denies: Anemia, Hemophilia, Sickle Cell Disease


Infectious Medical History: 


   Denies: HIV





Past Surgical History


Past Surgical History: Reports: Appendectomy, Cardiac Catheterization,  

Section - x4, Coronary Stent, Hysterectomy, Orthopedic Surgery - bilateral knee 

surgery, Pacemaker, Tonsillectomy


   Denies: Amputation, Cholecystectomy, Colostomy, Coronary Artery Bypass Graft

, Gastric Bypass Surgery, Herniorrhaphy, Mastectomy, Tubal Ligation





Social History


Lives with: Family


Smoking Status: Current Some Day Smoker


Frequency of Alcohol Use: None


Hx Recreational Drug Use: No


Hx Prescription Drug Abuse: No





- Advance Directive


Resuscitation Status: Full Code





Family History


Family History: Reviewed & Not Pertinent, Hypertension


Parental Family History Reviewed: Yes


Children Family History Reviewed: Yes


Sibling(s) Family History Reviewed.: Yes





Medication/Allergy


Home Medications: 








Albuterol Sulfate [Albuterol Sulfate 2.5mg/3 mL] 1 vial IH Q4 PRN 16 


Amlodipine Besylate 5 mg PO BID 16 


Atorvastatin Calcium 40 mg PO DAILY 16 


Carvedilol [Coreg] 25 mg PO BID 16 


Hydralazine HCl 50 mg PO TID 16 


Hydrochlorothiazide 12.5 mg PO DAILY 16 


Trazodone HCl [Desyrel 50 mg Tablet] 50 mg PO QHS 16 


Aspirin [Aspirin EC] 81 mg PO DAILY 01/10/17 


Diclofenac Sodium [Voltaren] 100 gm TP QID 01/10/17 


Docusate Sodium [Dok] 100 mg PO DAILY PRN 01/10/17 


Esomeprazole Magnesium [Nexium 24Hr] 22.3 mg PO DAILY 01/10/17 


Fluticasone Propionate 1 spray NS BID 01/10/17 


Ibuprofen/Famotidine [Duexis 800-26.6 mg Tablet] 1 each PO DAILY PRN 01/10/17 


Isosorbide Mononitrate [Isosorbide Mononitrate ER] 30 mg PO DAILY 01/10/17 


Latanoprost [Xalatan 0.005% Oph Soln 2.5 ml] 1 drop OU QHS 01/10/17 


Lubiprostone [Amitiza 8 Mcg Capsule] 1 cap PO DAILY 01/10/17 


Nitroglycerin 0.4 mg SL ASDIR PRN 01/10/17 


Polyethylene Glycol 3350 [Gavilax] 17 gm PO DAILY PRN 01/10/17 


Ranitidine HCl [Acid Control] 75 mg PO BID 01/10/17 


Docusate Sodium [Colace 100 mg Capsule] 100 mg PO DAILY #30 capsule 17 


Oxycodone HCl/Acetaminophen [Percocet 5-325 mg Tablet] 0.5 - 1 tab PO Q4H PRN #

10 tablet 17 








Allergies/Adverse Reactions: 


 





amoxicillin [Amoxicillin] Allergy (Intermediate, Verified 17 16:46)


 Hallucinations


Penicillins Allergy (Intermediate, Verified 17 16:46)


 Hallucinations











Physical Exam


Vital Signs: 


 











Temp Pulse Resp BP Pulse Ox


 


 97.4 F   62   14   151/70 H  97 


 


 10/09/17 00:46  10/09/17 05:59  10/09/17 05:59  10/09/17 05:59  10/09/17 05:59











General appearance: PRESENT: no acute distress


Eye exam: PRESENT: EOMI


Cardiovascular exam: PRESENT: other - And has a left subclavian defibrillator


GI/Abdominal exam: PRESENT: other - All scars consistent with previous surgery 

including  section, fibrillator placement and replacement


Rectal exam: PRESENT: deferred





Results


Laboratory Results: 


 





 10/09/17 08:05 





 











  10/09/17 10/09/17





  08:05 08:25


 


WBC  3.0 L 


 


RBC  2.89 L 


 


Hgb  9.0 L D 


 


Hct  27.1 L 


 


MCV  94 


 


MCH  31.0 


 


MCHC  33.1 


 


RDW  15.7 H 


 


Plt Count  158 


 


Urine Color   YELLOW


 


Urine Appearance   CLEAR


 


Urine pH   5.0


 


Ur Specific Gravity   1.016


 


Urine Protein   NEGATIVE


 


Urine Glucose (UA)   NEGATIVE


 


Urine Ketones   NEGATIVE


 


Urine Blood   SMALL H


 


Urine Nitrite   NEGATIVE


 


Ur Leukocyte Esterase   NEGATIVE


 


Urine WBC (Auto)   1


 


Urine RBC (Auto)   0














Assessment & Plan





- Diagnosis


(1) Rectal bleeding


Is this a current diagnosis for this admission?: Yes   


Plan: 


Current episodes, reviewing medical records, it is difficult to ascertain the 

etiology of her previous GI bleeds.





Plan:





We will set patient up for upper and lower endoscopy, 1 hour, [facility]; will initiate bowel prep this morning.  Perform the procedure under 

conscious sedation.

## 2017-10-09 NOTE — EKG REPORT
SEVERITY:- ABNORMAL ECG -

SINUS RHYTHM

ABNORMAL T, CONSIDER ISCHEMIA, ANT-LAT LEADS

:

Confirmed by: Mica Hyatt MD 09-Oct-2017 06:22:13

## 2017-10-09 NOTE — OPERATIVE REPORT
Operative Report


DATE OF SURGERY: 10/09/17


PREOPERATIVE DIAGNOSIS: Acute gastrointestinal hemorrhage


POSTOPERATIVE DIAGNOSIS: Same with cecal arteriovenous malformation


OPERATION: 1.  Esophagogastroduodenoscopy.  2.  Total colonoscopy to cecum


SURGEON: DONYA SANTOS


ANESTHESIA: Moderate Sedation


TISSUE REMOVED OR ALTERED: None


COMPLICATIONS: 





None


ESTIMATED BLOOD LOSS: Minimal


INTRAOPERATIVE FINDINGS: See below


PROCEDURE: 


The patient was taken to the fifth floor endoscopy suite were monitoring 

devices were attached and the patient was placed in the semirecumbent left 

lateral position mouthpiece inserted an appropriate level of conscious sedation 

induced.











The patient remained hemodynamically stable with good oxygenation.  She was not 

actively bleeding during this procedure





Surgical plan surgical timeout were conducted.





The flexible adult upper endoscope was advanced to the hypopharynx through the 

esophagus into the stomach into the first and second portions of the duodenum.  

There was an excellent study.  There was no evidence of gastrointestinal 

bleeding.  The patient did have a moderate sized hiatal hernia.  There is no 

evidence of ulcer or stricture or polyp.  No biopsies were obtained.  Scope was 

withdrawn to the patient's oropharynx.  She taught procedure well





Patient was repositioned instrumentation switched up and procedure for 

colonoscopy performed.  A rectal exam was performed.  The patient did evacuate 

some loose Bassem-Aid colored red E fluent.  Rectal exam revealed no visible or 

palpable anorectal pathology





The flexible adult colonoscope was advanced to the anorectal canal all the way 

to the cecum.  This was a very good study well-prepped bowel.  The only blood 

encountered was a collection of fresh blood, clot, and artery dilute blood in 

the cecum and ascending colon.  All of this is successfully aspirated until the 

point of origination of the bleeding ascertained and in fact it was a very 

small spot AVM with clot on it in the cecum.  Photographs were taken.  We 

monitored this area carefully and there was no ongoing bleeding.  We left a 

clot in situ.  I attempted to cannulate the terminal ileum but was 

unsuccessful.  We did not see any blood coming from the terminal ileum into the 

cecum.  Felt confident that the source of bleeding was in fact the AVM.  We 

discussed injecting the pauly-AVM tissue with epinephrine  but since there was 

no bleeding we did not perform this procedure.





The scope was withdrawn through the length of the colon check and the mucosa 

carefully ; No significant pathology identified.  No further bleeding 

identified scope was withdrawn to the patient's anus.  She tolerated procedure 

well.





Per screening guidelines, should be an appropriate candidate for follow-up 

colonoscopy in 10 years, or sooner if symptoms develop.

## 2017-10-09 NOTE — ER DOCUMENT REPORT
ED GI Bleed / Rectal Pain





- General


Mode of Arrival: Ambulatory


Information source: Patient


TRAVEL OUTSIDE OF THE [place] IN LAST 30 DAYS: No





- HPI


Patient complains to provider of: Dark/tarry stools


Onset: Just prior to arrival





<NINA JONES - Last Filed: 10/09/17 00:39>





<FLACO VELIZ - Last Filed: 10/09/17 03:38>





- General


Chief Complaint: Rectal Bleeding


Stated Complaint: RECTAL BLEEDING


Time Seen by Provider: 10/09/17 00:16


Notes: 





Patient is a 72 year old female that presents to the emergency department today 

with complaints of a bloody bowel movement just prior to arrival. Patient 

states she had abdominal pain prior to this bowel movement today. Patient has a 

history of multiple GI bleeds and she has been sent to [facility] in the past. 

Patient states she recently had a colonoscopy by Dr. Altman which "showed 

nothing". Patient has had multiple colonoscopies in the past and was seen here 

on 2014 with a GI bleed. Patient states the only blood thinning 

medication she takes is aspirin. Patient also complains of a slight headache. (

NINA JONES)





- Related Data


Allergies/Adverse Reactions: 


 





amoxicillin [Amoxicillin] Allergy (Intermediate, Verified 17 16:46)


 Hallucinations


Penicillins Allergy (Intermediate, Verified 17 16:46)


 Hallucinations











Past Medical History





- General


Information source: Patient, [facility] Records





- Social History


Smoking Status: Current Every Day Smoker


Cigarette use (# per day): Yes


Frequency of alcohol use: None


Drug Abuse: None


Lives with: Family


Family History: Reviewed & Not Pertinent, Hypertension





- Past Medical History


Cardiac Medical History: Reports: Hx Congestive Heart Failure, Hx Coronary 

Artery Disease, Hx Heart Attack - , Hx Hypercholesterolemia, Hx 

Hypertension - medicated


Pulmonary Medical History: Reports: Hx Bronchitis, Hx COPD, Hx Pneumonia


Renal/ Medical History: Reports: Hx Renal Insufficiency


GI Medical History: Reports: Hx Crohn's Disease, Hx Diverticulitis, Hx 

Gastroesophageal Reflux Disease


Musculoskeltal Medical History: Reports Hx Arthritis


Psychiatric Medical History: Reports: Hx Depression


Traumatic Medical History: Reports: Hx Fractures - right wrist, left foot


Past Surgical History: Reports: Hx Appendectomy, Hx Cardiac Catheterization, Hx 

Cardiac Surgery - AICD, Hx  Section - x4, Hx Coronary Stent, Hx 

Hysterectomy, Hx Orthopedic Surgery - bilateral knee surgery, Hx Pacemaker, Hx 

Tonsillectomy





- Immunizations


Immunizations up to date: Yes


Hx Diphtheria, Pertussis, Tetanus Vaccination: Yes


Hx Pneumococcal Vaccination: 01/01/10





<NINA JONES - Last Filed: 10/09/17 00:39>


GI Medical History: Reports: Hx Crohn's Disease - Patient has either Crohn's 

disease or ulcerative colitis


Past Surgical History: Reports: Hx Cardiac Surgery - AICD for ventricular 

fibrillation





<FLACO VELIZ - Last Filed: 10/09/17 03:38>





Review of Systems





- Review of Systems


Constitutional: No symptoms reported


EENT: No symptoms reported


Cardiovascular: No symptoms reported


Respiratory: No symptoms reported


Gastrointestinal: See HPI, Abdominal pain, Black stools


Genitourinary: No symptoms reported


Female Genitourinary: No symptoms reported


Musculoskeletal: No symptoms reported


Skin: No symptoms reported


Hematologic/Lymphatic: No symptoms reported


Neurological/Psychological: See HPI, Headaches


-: Yes All other systems reviewed and negative





<NINA JONES - Last Filed: 10/09/17 00:39>





Physical Exam





<NINA JONES - Last Filed: 10/09/17 00:39>





<FLACO VELIZ - Last Filed: 10/09/17 03:38>





- Vital signs


Vitals: 


 











Resp Pulse Ox


 


 18   99 


 


 10/09/17 00:33  10/09/17 00:33














- Notes


Notes: 





Physical Exam:


 


General: Alert, appears age appropriate.  


 


HEENT: Normocephalic. Atraumatic. PERRL. Extraocular movements intact. 

Oropharynx clear.


 


Neck: Supple. Non-tender.


 


Respiratory: No respiratory distress. Clear and equal breath sounds bilaterally.


 


Cardiovascular: Regular rate and rhythm. 


 


Abdominal: Minimal tenderness with palpation over left suprapubic area. Large 

amount of black liquid stool in bedside toilet. No distension. Normal Bowel 

Sounds. 


 


Back: Non-tender. No deformity or step off.


 


Extremities: Moves all four extremities.


Upper extremities: Normal inspection. Normal ROM.  


Lower extremities: Normal inspection. No edema. Normal ROM.


 


Neurological: Normal cognition. AAOx4. Normal speech.  


 


Psychological: Normal affect. Normal Mood. 


 


Skin: Warm. Dry. Normal color. (NINA JONES)





Course





- Laboratory


Result Diagrams: 


 10/09/17 00:10





 10/09/17 00:10





- EKG Interpretation by Me


EKG shows normal: Sinus rhythm, Axis, Intervals, QRS Complexes, ST-T Waves - 

Abnormal anterolateral ST wave


Rate: Normal - 64


Rhythm: NSR


When compared to previous EKG there are: Changes noted





- Consults


  ** Dr. Hamilton


Time consulted: 03:35


Consulted provider: will see as inpatient - Admit to telemetry, Dr. Morales's 

service





<FLACO VELIZ - Last Filed: 10/09/17 03:38>





- Vital Signs


Vital signs: 


 











Temp Pulse Resp BP Pulse Ox


 


 97.4 F   75   15   159/73 H  98 


 


 10/09/17 00:46  10/09/17 00:46  10/09/17 02:01  10/09/17 02:01  10/09/17 02:01














- Laboratory


Laboratory results interpreted by me: 


 











  10/09/17 10/09/17





  00:10 00:10


 


WBC  3.2 L 


 


Hgb  11.5 L 


 


Hct  35.0 L 


 


RDW  15.7 H 


 


Seg Neutrophils %  35.6 L 


 


Lymphocytes %  58.6 H 


 


Absolute Neutrophils  1.2 L 


 


Sodium   145.4 H


 


Chloride   112 H


 


BUN   21 H


 


Est GFR (Non-Af Amer)   50 L


 


Glucose   125 H


 


AST   55 H














Discharge





<NINA JONES - Last Filed: 10/09/17 00:39>





- Discharge


Admitting Provider: Andrew - Dr. Hamilton is covering


Unit Admitted: Telemetry





<FLACO VELIZ - Last Filed: 10/09/17 03:38>





- Discharge


Clinical Impression: 


 Rectal bleeding, AICD (automatic cardioverter/defibrillator) present





High blood pressure


Qualifiers:


 Hypertension type: essential hypertension Qualified Code(s): I10 - Essential (

primary) hypertension





COPD (chronic obstructive pulmonary disease)


Qualifiers:


 COPD type: unspecified COPD Qualified Code(s): J44.9 - Chronic obstructive 

pulmonary disease, unspecified





Headache


Qualifiers:


 Headache type: unspecified Headache chronicity pattern: unspecified pattern 

Intractability: not intractable Qualified Code(s): R51 - Headache





Condition: Stable


Disposition: ADMITTED AS INPATIENT


Referrals: 


BISMARK MORALES MD [Primary Care Provider] - Follow up as needed


Scribe Attestation: 





10/09/17 00:51


I personally performed the services described in the documentation, reviewed 

and edited the documentation which was dictated to the scribe in my presence, 

and it accurately records my words and actions. (FLACO VELIZ)





Scribe Documentation





- Scribe


Written by Will:: Will Choe, 10/9/2017 0043


acting as scribe for :: Johnny





<NINA JONES - Last Filed: 10/09/17 00:39>

## 2017-10-10 LAB
ANION GAP SERPL CALC-SCNC: 9 MMOL/L (ref 5–19)
BUN SERPL-MCNC: 15 MG/DL (ref 7–20)
CALCIUM: 8.8 MG/DL (ref 8.4–10.2)
CHLORIDE SERPL-SCNC: 112 MMOL/L (ref 98–107)
CO2 SERPL-SCNC: 21 MMOL/L (ref 22–30)
CREAT SERPL-MCNC: 1.2 MG/DL (ref 0.52–1.25)
ERYTHROCYTE [DISTWIDTH] IN BLOOD BY AUTOMATED COUNT: 15.4 % (ref 11.5–14)
ERYTHROCYTE [DISTWIDTH] IN BLOOD BY AUTOMATED COUNT: 15.5 % (ref 11.5–14)
GLUCOSE SERPL-MCNC: 118 MG/DL (ref 75–110)
HCT VFR BLD CALC: 21.8 % (ref 36–47)
HCT VFR BLD CALC: 31.9 % (ref 36–47)
HGB BLD-MCNC: 10.9 G/DL (ref 12–15.5)
HGB BLD-MCNC: 7.3 G/DL (ref 12–15.5)
HGB HCT DIFFERENCE: 0.1
HGB HCT DIFFERENCE: 0.8
MCH RBC QN AUTO: 31.2 PG (ref 27–33.4)
MCH RBC QN AUTO: 31.2 PG (ref 27–33.4)
MCHC RBC AUTO-ENTMCNC: 33.5 G/DL (ref 32–36)
MCHC RBC AUTO-ENTMCNC: 34 G/DL (ref 32–36)
MCV RBC AUTO: 92 FL (ref 80–97)
MCV RBC AUTO: 93 FL (ref 80–97)
POTASSIUM SERPL-SCNC: 4.1 MMOL/L (ref 3.6–5)
RBC # BLD AUTO: 2.34 10^6/UL (ref 3.72–5.28)
RBC # BLD AUTO: 3.48 10^6/UL (ref 3.72–5.28)
SODIUM SERPL-SCNC: 142.2 MMOL/L (ref 137–145)
WBC # BLD AUTO: 3 10^3/UL (ref 4–10.5)
WBC # BLD AUTO: 4.5 10^3/UL (ref 4–10.5)

## 2017-10-10 PROCEDURE — 30233N1 TRANSFUSION OF NONAUTOLOGOUS RED BLOOD CELLS INTO PERIPHERAL VEIN, PERCUTANEOUS APPROACH: ICD-10-PCS | Performed by: FAMILY MEDICINE

## 2017-10-10 RX ADMIN — BUSPIRONE HYDROCHLORIDE SCH MG: 10 TABLET ORAL at 16:23

## 2017-10-10 RX ADMIN — HYDRALAZINE HYDROCHLORIDE SCH MG: 25 TABLET, FILM COATED ORAL at 22:02

## 2017-10-10 RX ADMIN — HYDRALAZINE HYDROCHLORIDE SCH MG: 25 TABLET, FILM COATED ORAL at 16:23

## 2017-10-10 RX ADMIN — HYDRALAZINE HYDROCHLORIDE SCH MG: 25 TABLET, FILM COATED ORAL at 07:03

## 2017-10-10 RX ADMIN — AMLODIPINE BESYLATE SCH MG: 5 TABLET ORAL at 13:21

## 2017-10-10 RX ADMIN — PANTOPRAZOLE SODIUM SCH MG: 40 INJECTION, POWDER, FOR SOLUTION INTRAVENOUS at 13:23

## 2017-10-10 RX ADMIN — LATANOPROST SCH DROP: 50 SOLUTION OPHTHALMIC at 22:02

## 2017-10-10 RX ADMIN — BUDESONIDE AND FORMOTEROL FUMARATE DIHYDRATE SCH PUFF: 160; 4.5 AEROSOL RESPIRATORY (INHALATION) at 22:02

## 2017-10-10 RX ADMIN — AMLODIPINE BESYLATE SCH MG: 5 TABLET ORAL at 22:02

## 2017-10-10 RX ADMIN — BUDESONIDE AND FORMOTEROL FUMARATE DIHYDRATE SCH PUFF: 160; 4.5 AEROSOL RESPIRATORY (INHALATION) at 13:22

## 2017-10-10 RX ADMIN — FLUTICASONE PROPIONATE SCH SPRAY: 50 SPRAY, METERED NASAL at 18:46

## 2017-10-10 RX ADMIN — PANTOPRAZOLE SODIUM SCH MG: 40 INJECTION, POWDER, FOR SOLUTION INTRAVENOUS at 22:02

## 2017-10-10 RX ADMIN — BUSPIRONE HYDROCHLORIDE SCH MG: 10 TABLET ORAL at 07:02

## 2017-10-10 RX ADMIN — RANOLAZINE SCH MG: 500 TABLET, FILM COATED, EXTENDED RELEASE ORAL at 13:19

## 2017-10-10 RX ADMIN — FLUTICASONE PROPIONATE SCH SPRAY: 50 SPRAY, METERED NASAL at 07:03

## 2017-10-10 RX ADMIN — CARVEDILOL SCH MG: 12.5 TABLET, FILM COATED ORAL at 13:20

## 2017-10-10 RX ADMIN — BUSPIRONE HYDROCHLORIDE SCH MG: 10 TABLET ORAL at 22:02

## 2017-10-10 RX ADMIN — RANOLAZINE SCH MG: 500 TABLET, FILM COATED, EXTENDED RELEASE ORAL at 22:02

## 2017-10-10 RX ADMIN — ESCITALOPRAM OXALATE SCH MG: 10 TABLET, FILM COATED ORAL at 13:22

## 2017-10-10 RX ADMIN — ATORVASTATIN CALCIUM SCH MG: 40 TABLET, FILM COATED ORAL at 22:02

## 2017-10-10 NOTE — PDOC PROGRESS REPORT
Subjective


Progress Note for:: 10/10/17


Subjective:: 





Patient is currently doing much better patient underwent for the ablation of 

the AVM and currently denied any blood in the stool


Patient is currently receiving the second unit of the blood


Patient's denied any chest pain without any shortness of the breath





Physical Exam


Vital Signs: 


 











Temp Pulse Resp BP Pulse Ox


 


 97.4 F   72   16   122/64   94 


 


 10/10/17 06:00  10/10/17 08:00  10/10/17 08:00  10/10/17 06:00  10/10/17 08:00








 Intake & Output











 10/09/17 10/10/17 10/11/17





 06:59 06:59 06:59


 


Intake Total  3110 320


 


Output Total  2970 


 


Balance  140 320











General appearance: PRESENT: no acute distress, well-developed, well-nourished


Head exam: PRESENT: atraumatic, normocephalic


Eye exam: PRESENT: conjunctiva pink, EOMI, PERRLA.  ABSENT: scleral icterus


Ear exam: PRESENT: normal external ear exam


Mouth exam: PRESENT: moist, tongue midline


Neck exam: PRESENT: full ROM.  ABSENT: carotid bruit, JVD, lymphadenopathy, 

thyromegaly


Respiratory exam: PRESENT: clear to auscultation ximena


Cardiovascular exam: PRESENT: RRR.  ABSENT: diastolic murmur, rubs, systolic 

murmur


Pulses: PRESENT: normal dorsalis pedis pul, +2 pedal pulses bilateral


Vascular exam: PRESENT: normal capillary refill


GI/Abdominal exam: PRESENT: normal bowel sounds, soft.  ABSENT: distended, 

guarding, mass, organolmegaly, rebound, tenderness


Rectal exam: PRESENT: deferred


Extremities exam: ABSENT: pedal edema


Musculoskeletal exam: PRESENT: ambulatory


Neurological exam: PRESENT: alert, awake, oriented to person, oriented to place

, oriented to time, oriented to situation, CN II-XII grossly intact.  ABSENT: 

motor sensory deficit


Psychiatric exam: PRESENT: appropriate affect, normal mood.  ABSENT: homicidal 

ideation, suicidal ideation


Skin exam: PRESENT: dry, intact, warm.  ABSENT: cyanosis, rash





Results


Laboratory Results: 


 





 10/10/17 01:01 





 











  10/09/17 10/09/17 10/09/17





  15:20 17:00 19:08


 


WBC  Cancelled  3.7 L  3.6 L


 


RBC  Cancelled  2.56 L  2.63 L


 


Hgb  Cancelled  8.1 L  8.1 L


 


Hct  Cancelled  24.1 L  24.6 L


 


MCV  Cancelled  94  94


 


MCH  Cancelled  31.6  30.8


 


MCHC  Cancelled  33.6  32.9


 


RDW  Cancelled  15.5 H  15.7 H


 


Plt Count  Cancelled  148 L  151














  10/10/17





  01:01


 


WBC  3.0 L


 


RBC  2.34 L


 


Hgb  7.3 L


 


Hct  21.8 L


 


MCV  93


 


MCH  31.2


 


MCHC  33.5


 


RDW  15.4 H


 


Plt Count  136 L














Assessment & Plan





- Diagnosis


(1) Rectal bleeding


Is this a current diagnosis for this admission?: Yes   


Plan: 


Due to the AVM status post ablation








(2) AICD (automatic cardioverter/defibrillator) present


Is this a current diagnosis for this admission?: Yes   


Plan: 


Currently stable








(3) COPD (chronic obstructive pulmonary disease)


Qualifiers: 


   COPD type: chronic bronchitis   Qualified Code(s): J44.9 - Chronic 

obstructive pulmonary disease, unspecified   


Is this a current diagnosis for this admission?: Yes   


Plan: 


Continues DuoNeb nebulizer








(4) HTN (hypertension)


Qualifiers: 


   Hypertension type: essential hypertension   Qualified Code(s): I10 - 

Essential (primary) hypertension   


Is this a current diagnosis for this admission?: Yes   


Plan: 


Continues current medications








(5) Coronary artery disease


Qualifiers: 


   Coronary Disease-Associated Artery/Lesion type: unspecified vessel or lesion 

type   Associated angina: without angina 


Is this a current diagnosis for this admission?: Yes   


Plan: 


Patient usually see a [place] [facility] and recently increased the Ranexa and 

suggest the medical management and if is persistent any issue with the heart 

Need to reevaluate the RCA


Patient have a cardiac cath done in May 2017 was LAD was all stable with some 

lesion in the RAD and suggest the medical management








(6) GERD (gastroesophageal reflux disease)


Qualifiers: 


   Esophagitis presence: without esophagitis   Qualified Code(s): K21.9 - Gastro

-esophageal reflux disease without esophagitis   


Is this a current diagnosis for this admission?: Yes   


Plan: 


Put the patient on the Protonix








(7) HLD (hyperlipidemia)


Qualifiers: 


   Hyperlipidemia type: unspecified   Qualified Code(s): E78.5 - Hyperlipidemia

, unspecified   


Is this a current diagnosis for this admission?: Yes   


Plan: 


Continues to statin








(8) Osteoarthritis


Qualifiers: 


   Osteoarthritis location: unspecified site 


Is this a current diagnosis for this admission?: Yes   





(9) Chronic pain syndrome


Is this a current diagnosis for this admission?: Yes   


Plan: 


Patient usually see a pain management








- Time


Time Spent with patient: 15-24 minutes


Medications reviewed and adjusted accordingly: Yes


Anticipated discharge: Home


Within: within 24 hours





- Inpatient Certification


Medical Necessity: Need Close Monitoring Due to Risk of Patient Decompensation


Post Hospital Care: D/C Planner Documentation





- Plan Summary


Plan Summary: 





Will continues to monitor the patient repeat the CBC and Chem-7

## 2017-10-10 NOTE — RADIOLOGY REPORT (SQ)
EXAM DESCRIPTION:  CT HEAD WITHOUT



COMPLETED DATE/TIME:  10/10/2017 7:16 pm



REASON FOR STUDY:  Headache



COMPARISON:  5/21/2017



TECHNIQUE:  Axial images acquired through the brain without intravenous contrast.  Images reviewed wi
th bone, brain and subdural windows.  Images stored on PACS.

All CT scanners at this facility use dose modulation, iterative reconstruction, and/or weight based d
osing when appropriate to reduce radiation dose to as low as reasonably achievable (ALARA).

[facility]: Dose Right  [facility]: CareDose    [facility]: Dose Right    [facility]: Teradose 4D    [facility]: Smart Technologies



RADIATION DOSE:  Up-to-date CT equipment and radiation dose reduction techniques were employed. CTDIv
ol: 64.6 mGy. DLP: 1163 mGy-cm. mGy.



LIMITATIONS:  None.



FINDINGS:  VENTRICLES: Age-appropriate.

CEREBRUM: No masses.  No hemorrhage.  No midline shift.  Few Areas of low density in the white matter
 most likely due to chronic micro-vascular ischemic change.  No evidence for acute infarction.

CEREBELLUM: No masses.  No hemorrhage.  Few Areas of low density in the white matter most likely due 
to chronic micro-vascular ischemic change..  No evidence for acute infarction.

EXTRAAXIAL SPACES: Mild age-related involutional change.  No fluid collections.  No masses.

ORBITS AND GLOBE: No intra- or extraconal masses.  Normal contour of globe without masses.

CALVARIUM: No fracture.

PARANASAL SINUSES: No fluid or mucosal thickening.

SOFT TISSUES: No mass or hematoma.

OTHER: No other significant finding.



IMPRESSION:  No acute intracranial findings.

EVIDENCE OF ACUTE STROKE: NO.



TECHNICAL DOCUMENTATION:  JOB ID:  [id number]

Quality ID # [id number]: Final reports with documentation of one or more dose reduction techniques (e.g., Au
tomated exposure control, adjustment of the mA and/or kV according to patient size, use of iterative 
reconstruction technique)

 2011 [employer]- All Rights Reserved

## 2017-10-11 VITALS — SYSTOLIC BLOOD PRESSURE: 128 MMHG | DIASTOLIC BLOOD PRESSURE: 50 MMHG

## 2017-10-11 LAB
ANION GAP SERPL CALC-SCNC: 9 MMOL/L (ref 5–19)
BASOPHILS # BLD AUTO: 0 10^3/UL (ref 0–0.2)
BASOPHILS NFR BLD AUTO: 0.4 % (ref 0–2)
BUN SERPL-MCNC: 19 MG/DL (ref 7–20)
CALCIUM: 9 MG/DL (ref 8.4–10.2)
CHLORIDE SERPL-SCNC: 110 MMOL/L (ref 98–107)
CO2 SERPL-SCNC: 23 MMOL/L (ref 22–30)
CREAT SERPL-MCNC: 1.38 MG/DL (ref 0.52–1.25)
EOSINOPHIL # BLD AUTO: 0 10^3/UL (ref 0–0.6)
EOSINOPHIL NFR BLD AUTO: 0.3 % (ref 0–6)
ERYTHROCYTE [DISTWIDTH] IN BLOOD BY AUTOMATED COUNT: 15.2 % (ref 11.5–14)
GLUCOSE SERPL-MCNC: 90 MG/DL (ref 75–110)
HCT VFR BLD CALC: 29.5 % (ref 36–47)
HGB BLD-MCNC: 10.1 G/DL (ref 12–15.5)
HGB HCT DIFFERENCE: 0.8
LYMPHOCYTES # BLD AUTO: 1.5 10^3/UL (ref 0.5–4.7)
LYMPHOCYTES NFR BLD AUTO: 36.2 % (ref 13–45)
MCH RBC QN AUTO: 31.3 PG (ref 27–33.4)
MCHC RBC AUTO-ENTMCNC: 34.2 G/DL (ref 32–36)
MCV RBC AUTO: 91 FL (ref 80–97)
MONOCYTES # BLD AUTO: 0.2 10^3/UL (ref 0.1–1.4)
MONOCYTES NFR BLD AUTO: 4 % (ref 3–13)
NEUTROPHILS # BLD AUTO: 2.5 10^3/UL (ref 1.7–8.2)
NEUTS SEG NFR BLD AUTO: 59.1 % (ref 42–78)
POTASSIUM SERPL-SCNC: 4 MMOL/L (ref 3.6–5)
RBC # BLD AUTO: 3.23 10^6/UL (ref 3.72–5.28)
SODIUM SERPL-SCNC: 141.9 MMOL/L (ref 137–145)
WBC # BLD AUTO: 4.3 10^3/UL (ref 4–10.5)

## 2017-10-11 RX ADMIN — ESCITALOPRAM OXALATE SCH MG: 10 TABLET, FILM COATED ORAL at 11:27

## 2017-10-11 RX ADMIN — HYDRALAZINE HYDROCHLORIDE SCH MG: 25 TABLET, FILM COATED ORAL at 06:31

## 2017-10-11 RX ADMIN — FLUTICASONE PROPIONATE SCH SPRAY: 50 SPRAY, METERED NASAL at 06:31

## 2017-10-11 RX ADMIN — CARVEDILOL SCH MG: 12.5 TABLET, FILM COATED ORAL at 11:34

## 2017-10-11 RX ADMIN — AMLODIPINE BESYLATE SCH MG: 5 TABLET ORAL at 11:33

## 2017-10-11 RX ADMIN — RANOLAZINE SCH MG: 500 TABLET, FILM COATED, EXTENDED RELEASE ORAL at 11:28

## 2017-10-11 RX ADMIN — BUSPIRONE HYDROCHLORIDE SCH MG: 10 TABLET ORAL at 06:31

## 2017-10-11 RX ADMIN — BUDESONIDE AND FORMOTEROL FUMARATE DIHYDRATE SCH PUFF: 160; 4.5 AEROSOL RESPIRATORY (INHALATION) at 11:31

## 2017-10-11 RX ADMIN — PANTOPRAZOLE SODIUM SCH MG: 40 INJECTION, POWDER, FOR SOLUTION INTRAVENOUS at 11:29

## 2017-10-11 RX ADMIN — CARVEDILOL SCH MG: 12.5 TABLET, FILM COATED ORAL at 00:49

## 2017-10-11 NOTE — PDOC DISCHARGE SUMMARY
General





- Admit/Disc Date/PCP


Admission Date/Primary Care Provider: 


  10/09/17 07:08





  BISMARK CABALLERO MD





Discharge Date: 10/11/17





- Discharge Diagnosis


(1) Rectal bleeding


Is this a current diagnosis for this admission?: Yes   


Summary: 


Status post endoscopy and colonoscopy and ablation of the AVMStatus post blood 

transfusions last hemoglobin was 10.3








(2) AICD (automatic cardioverter/defibrillator) present


Is this a current diagnosis for this admission?: Yes   


Summary: 


Currently all stableFollow with a [place] [facility]








(3) COPD (chronic obstructive pulmonary disease)


Is this a current diagnosis for this admission?: Yes   


Summary: 


Discussed with the patient about smoking cessation








(4) HTN (hypertension)


Is this a current diagnosis for this admission?: Yes   


Summary: 


Continues current medication








(5) Coronary artery disease


Is this a current diagnosis for this admission?: Yes   


Summary: 


Patient is recently have all the workup done at [facility] currently all stable








(6) GERD (gastroesophageal reflux disease)


Is this a current diagnosis for this admission?: Yes   


Summary: 


Continues to omeprazole








(7) HLD (hyperlipidemia)


Is this a current diagnosis for this admission?: Yes   


Summary: 


Continues a statin








(8) Osteoarthritis


Is this a current diagnosis for this admission?: Yes   





(9) Chronic pain syndrome


Is this a current diagnosis for this admission?: Yes   


Summary: 


Since follow-up outpatients pain management








- Additional Information


Resuscitation Status: Full Code


Discharge Activity: Activity As Tolerated


Home Medications: 








Albuterol Sulfate [Albuterol Sulfate 2.5mg/3 mL] 1 vial IH TID 10/09/17 


Alprazolam [Xanax 0.5 mg Tablet] 0.5 mg PO BIDP PRN 10/09/17 


Amlodipine Besylate [Norvasc 5 mg Tablet] 5 mg PO Q12 10/09/17 


Atorvastatin Calcium [Lipitor 40 mg Tablet] 40 mg PO QHS 10/09/17 


Budesonide/Formoterol Fumarate [Symbicort -4.5 mcg Inhaler 6 gm] 2 puff 

IH Q12 10/09/17 


Buspirone HCl [Buspar 10 mg Tablet] 10 mg PO Q8 10/09/17 


Carvedilol [Coreg 25 mg Tablet] 1 tab PO Q12 10/09/17 


Diclofenac Sodium [Voltaren] 100 gm TP QIDP PRN 10/09/17 


Escitalopram Oxalate [Lexapro 10 mg Tablet] 10 mg PO DAILY 10/09/17 


Fluticasone Propionate [Flonase Nasal Spray 50 Mcg/Spray 16 gm] 1 spray NASL 

BID 10/09/17 


Gabapentin [Neurontin 300 mg Capsule] 300 mg PO BIDP PRN 10/09/17 


Hydralazine HCl [Apresoline 25 mg Tablet] 50 mg PO Q8 10/09/17 


Hydrochlorothiazide [Hydrodiuril 12.5 mg Capsule] 12.5 mg PO DAILY 10/09/17 


Ketorolac Tromethamine [Acular Ls] 1 drop OS TIDP PRN 10/09/17 


Latanoprost [Xalatan 0.005% Oph Soln 2.5 ml] 1 drop OD QHS 10/09/17 


Nitroglycerin [Nitrostat 0.4 mg (1/150 Gr) Tabs 25/Bottle] 1 tab SL Q5MP PRN 10/

09/17 


Omeprazole 40 mg PO QAM 10/09/17 


Ranolazine [Ranexa 500 mg Tab.sr] 500 mg PO Q12 10/09/17 


Tiotropium Bromide [Spiriva Respimat] 2 puff IH DAILY 10/09/17 


Azithromycin [Zithromax] 250 mg PO DAILY #5 tablet 10/11/17 











History of Present Illness


History of Present Illness: 


JAYCEE FLOWERS is a 72 year old female presents to the emergency department by 

ground rescue complaining of abdominal, gastrointestinal bleeding.  She was 

admitted to the internal medicine service for further evaluation.  She had no 

evidence of hemodynamic instability.  Bowel movements are described as bloody 

some clot.





Patient has a history of repeated gastrointestinal bleeding.  According to 

records, she underwent upper and lower endoscopy by Dr. Cantor 2013 where she 

was found to have duodenitis, ileocolitis and 2 polyps of the ascending colon.  

She underwent repeat upper endoscopy by Dr. Almeida was found to have gastritis 

and duodenitis in 2014.  Significant source of gastrointestinal bleeding was 

identified.  Last month the patient states she underwent upper and lower 

endoscopy by Dr. eason, the results of which are unknown.





According the patient the only blood thinner she takes his aspirin.  Since 

being admitted to the floor, she has had no further gastrointestinal bleeding.  

She denies emesis.


Patient also have a history of the coronary artery disease status post stent 

placement and a history of the ventricular tachycardia status post ICD 

placement and currently see a [place] cardiology and currently all stable


Patient's still have a blood in the stools


pt was diagnosed one time of the Crohn's disease but the doctor ivanna rivera told 

the patient patient does not have any Crohn's disease


Patient's son is on the bedside and discussed with the Dr. Dewey Was going to 

perform the endoscopy and colonoscopy today








Hospital Course


Hospital Course: 





This is a 72-year-old female she presented in the emergency department for the 

rectal bleeding and the patient was admitting in the hospitals patient 

underwent for the endoscopy and colonoscopy and found the AVM and ablation was 

doneAnd stopped all the bleedingPatient's otherwise transfuse her 2 L of the 

blood


Patient hemoglobin stable for the last 48 hours and no episode of the any 

bleeding


She was complaining some headache and a CT of the head was done was negative 

most likely related to the allergies


Patient have a mild sore throat after the endoscopyAnd a strep culture was done


Vision otherwise remained stable patients walk in the hallway without any 

problem patient's other medical problem was also stable


Patient is discharged home with the stable condition and following a one-week 

and currently hold aspirin and repeat the blood work again and if it remains 

stable restart the aspirin





Physical Exam


Vital Signs: 


 











Temp Pulse Resp BP Pulse Ox


 


 99.1 F   64   16   128/51 H  96 


 


 10/11/17 07:23  10/11/17 11:50  10/11/17 11:50  10/11/17 07:23  10/11/17 12:02








 Intake & Output











 10/10/17 10/11/17 10/12/17





 06:59 06:59 06:59


 


Intake Total 3110 1280 


 


Output Total 2970 1000 


 


Balance 140 280 











General appearance: PRESENT: no acute distress, well-developed, well-nourished


Head exam: PRESENT: atraumatic, normocephalic


Eye exam: PRESENT: conjunctiva pink, EOMI, PERRLA.  ABSENT: scleral icterus


Ear exam: PRESENT: normal external ear exam


Mouth exam: PRESENT: moist, tongue midline


Neck exam: PRESENT: full ROM.  ABSENT: carotid bruit, JVD, lymphadenopathy, 

thyromegaly


Respiratory exam: PRESENT: clear to auscultation ximena


Cardiovascular exam: PRESENT: RRR.  ABSENT: diastolic murmur, rubs, systolic 

murmur


Pulses: PRESENT: normal dorsalis pedis pul, +2 pedal pulses bilateral


Vascular exam: PRESENT: normal capillary refill


GI/Abdominal exam: PRESENT: normal bowel sounds, soft.  ABSENT: distended, 

guarding, mass, organolmegaly, rebound, tenderness


Rectal exam: PRESENT: deferred


Extremities exam: ABSENT: pedal edema


Musculoskeletal exam: PRESENT: ambulatory


Neurological exam: PRESENT: alert, awake, oriented to person, oriented to place

, oriented to time, oriented to situation, CN II-XII grossly intact.  ABSENT: 

motor sensory deficit


Psychiatric exam: PRESENT: appropriate affect, normal mood.  ABSENT: homicidal 

ideation, suicidal ideation


Skin exam: PRESENT: dry, intact, warm.  ABSENT: cyanosis, rash





Results


Laboratory Results: 


 





 10/11/17 08:55 





 10/11/17 05:28 





 











  10/10/17 10/10/17 10/11/17





  02:42 14:42 05:28


 


WBC   4.5 


 


RBC   3.48 L 


 


Hgb   10.9 L D 


 


Hct   31.9 L 


 


MCV   92 


 


MCH   31.2 


 


MCHC   34.0 


 


RDW   15.5 H 


 


Plt Count   137 L 


 


Seg Neutrophils %   


 


Lymphocytes %   


 


Monocytes %   


 


Eosinophils %   


 


Basophils %   


 


Absolute Neutrophils   


 


Absolute Lymphocytes   


 


Absolute Monocytes   


 


Absolute Eosinophils   


 


Absolute Basophils   


 


Sodium  142.2   141.9


 


Potassium  4.1   4.0


 


Chloride  112 H   110 H


 


Carbon Dioxide  21 L   23


 


Anion Gap  9   9


 


BUN  15   19


 


Creatinine  1.20   1.38 H


 


Est GFR ( Amer)  53 L   45 L


 


Est GFR (Non-Af Amer)  44 L   37 L


 


Glucose  118 H   90


 


Calcium  8.8   9.0














  10/11/17





  08:55


 


WBC  4.3


 


RBC  3.23 L


 


Hgb  10.1 L


 


Hct  29.5 L


 


MCV  91


 


MCH  31.3


 


MCHC  34.2


 


RDW  15.2 H


 


Plt Count  140 L


 


Seg Neutrophils %  59.1


 


Lymphocytes %  36.2


 


Monocytes %  4.0


 


Eosinophils %  0.3


 


Basophils %  0.4


 


Absolute Neutrophils  2.5


 


Absolute Lymphocytes  1.5


 


Absolute Monocytes  0.2


 


Absolute Eosinophils  0.0


 


Absolute Basophils  0.0


 


Sodium 


 


Potassium 


 


Chloride 


 


Carbon Dioxide 


 


Anion Gap 


 


BUN 


 


Creatinine 


 


Est GFR ( Amer) 


 


Est GFR (Non-Af Amer) 


 


Glucose 


 


Calcium 











Impressions: 


 





Head CT  10/10/17 00:00


IMPRESSION:  No acute intracranial findings.


EVIDENCE OF ACUTE STROKE: NO.


 














Plan


Time Spent: Greater than 30 Minutes - Patient's discharge home with the stable 

conditions all blood work is stable.  Patient's repeat the CBC and Chem-7 in 1 

week and hold aspirin

## 2017-11-02 NOTE — PDOC PROGRESS REPORT
Subjective


Progress Note for:: 11/02/17





Physical Exam


Vital Signs: 


 











Temp Pulse Resp BP Pulse Ox


 


 99.1 F   64   16   128/50 H  96 


 


 10/11/17 15:02  10/11/17 15:02  10/11/17 15:02  10/11/17 15:02  10/11/17 15:02














Results


Laboratory Results: 


 





 10/11/17 08:55 





 10/11/17 05:28 








Impressions: 


 





Head CT  10/10/17 00:00


IMPRESSION:  No acute intracranial findings.


EVIDENCE OF ACUTE STROKE: NO.


 














Assessment & Plan





- Diagnosis


(1) Rectal bleeding


Is this a current diagnosis for this admission?: Yes   





(2) AICD (automatic cardioverter/defibrillator) present


Is this a current diagnosis for this admission?: Yes   





(3) COPD (chronic obstructive pulmonary disease)


Qualifiers: 


   COPD type: chronic bronchitis   Qualified Code(s): J44.9 - Chronic 

obstructive pulmonary disease, unspecified   


Is this a current diagnosis for this admission?: Yes   





(4) HTN (hypertension)


Qualifiers: 


   Hypertension type: essential hypertension   Qualified Code(s): I10 - 

Essential (primary) hypertension   


Is this a current diagnosis for this admission?: Yes   





(5) Coronary artery disease


Qualifiers: 


   Coronary Disease-Associated Artery/Lesion type: unspecified vessel or lesion 

type   Associated angina: without angina 


Is this a current diagnosis for this admission?: Yes   





(6) GERD (gastroesophageal reflux disease)


Qualifiers: 


   Esophagitis presence: without esophagitis   Qualified Code(s): K21.9 - Gastro

-esophageal reflux disease without esophagitis   


Is this a current diagnosis for this admission?: Yes   





(7) HLD (hyperlipidemia)


Qualifiers: 


   Hyperlipidemia type: unspecified   Qualified Code(s): E78.5 - Hyperlipidemia

, unspecified   


Is this a current diagnosis for this admission?: Yes   





(8) Osteoarthritis


Qualifiers: 


   Osteoarthritis location: unspecified site 


Is this a current diagnosis for this admission?: Yes   





(9) Chronic pain syndrome


Is this a current diagnosis for this admission?: Yes   





- Plan Summary


Plan Summary: 





In my discharge summary already discuss the diagnosis that the patient have an 

ablation of the AVM was done which means patient have a blood loss from the AVM

## 2017-11-09 ENCOUNTER — HOSPITAL ENCOUNTER (OUTPATIENT)
Dept: HOSPITAL 62 - OD | Age: 72
End: 2017-11-09
Attending: PHYSICIAN ASSISTANT
Payer: MEDICARE

## 2017-11-09 DIAGNOSIS — K59.00: Primary | ICD-10-CM

## 2017-11-09 PROCEDURE — 74000: CPT

## 2017-11-09 NOTE — RADIOLOGY REPORT (SQ)
EXAM DESCRIPTION:  KUB



COMPLETED DATE/TIME:  11/9/2017 11:38 am



REASON FOR STUDY:  CONSTIPATION, UNSPECIFIED K59.00  CONSTIPATION, UNSPECIFIED



COMPARISON:  None.



NUMBER OF VIEWS:  One view.



TECHNIQUE:  Supine radiographic image of the abdomen acquired.



LIMITATIONS:  None.



FINDINGS:  BOWEL GAS PATTERN: Normal bowel gas pattern. No dilated loops.

CONSTIPATION: Mild-to-moderate

CALCIFICATIONS: No suspicious calcifications.

SOFT TISSUES: No gross mass or suggestion of organomegaly.

HARDWARE: None in the abdomen.

BONES: No acute fracture. No worrisome bone lesions.

OTHER: No other significant finding.



IMPRESSION:  NO RADIOGRAPHIC EVIDENCE FOR ACUTE ABDOMINAL DISEASE.

Mild to moderate constipation.



TECHNICAL DOCUMENTATION:  JOB ID:  [id number]

 2011 [employer]- All Rights Reserved

## 2018-01-16 ENCOUNTER — HOSPITAL ENCOUNTER (OUTPATIENT)
Dept: HOSPITAL 62 - WI | Age: 73
End: 2018-01-16
Attending: PHYSICIAN ASSISTANT
Payer: MEDICARE

## 2018-01-16 DIAGNOSIS — Z12.31: Primary | ICD-10-CM

## 2018-01-16 PROCEDURE — 77063 BREAST TOMOSYNTHESIS BI: CPT

## 2018-01-16 PROCEDURE — 77067 SCR MAMMO BI INCL CAD: CPT

## 2018-01-17 NOTE — WOMENS IMAGING REPORT
EXAM DESCRIPTION:  3D SCREENING MAMMO BILAT



COMPLETED DATE/TIME:  1/16/2018 12:00 pm



REASON FOR STUDY:  SCREENING MAMMO Z12.31  ENCNTR SCREEN MAMMOGRAM FOR MALIGNANT NEOPLASM OF STEPHEN



COMPARISON:  2010 to 2016



TECHNIQUE:  Standard craniocaudal and mediolateral oblique views of each breast recorded using digita
l acquisition and breast tomosynthesis.



LIMITATIONS:  Left pacemaker battery.



FINDINGS:  No masses, calcifications or architectural distortion. No areas of suspicion.

Read with the assistance of CAD.

[facility] Cenova Version 1.3

.[facility] - R2 Cenova Version 1.3

.[facility] - R2 Cenova Version 2.4

.[facility] - R2 Cenova Version 2.4

.[facility] - R2  Version 9.2



IMPRESSION:  NORMAL MAMMOGRAM.  BIRADS 1.



BREAST DENSITY:  b. There are scattered areas of fibroglandular density.



BIRAD:  1 NEGATIVE



RECOMMENDATION:  ROUTINE SCREENING



COMMENT:  The patient has been notified of the results by letter per SA requirements. Additional no
tification policies are in place for contacting patient with suspicious or incomplete findings.

Quality ID #225: The American College of Radiology recommends an annual screening mammogram for women
 aged 40 years or over. This facility utilizes a reminder system to ensure that all patients receive 
reminder letters, and/or direct phone calls for appointments. This includes reminders for routine scr
eening mammograms, diagnostic mammograms, or other Breast Imaging Interventions when appropriate.  Th
is patient will be placed in the appropriate reminder system.

The American College of Radiology (ACR) has developed recommendations for screening MRI of the breast
s in certain patient populations, to be used in conjunction with mammography.  Breast MRI surveillanc
e may be appropriate for women with more than 20% lifetime risk of developing breast cancer  as deter
mined by genetic testing, significant family history of the disease, or history of mantle radiation f
or Hodgkins Disease.  ACR Practice Guidelines 2008.

DBT Technology

DBT is a type of tomographic mammography. With conventional mammography, overlapping breast tissue ma
y make lesions difficult to detect, even with good compression. DBT uses an x-ray tube that rotates a
round the breast, taking images at different angles. These images are then combined to create thin sl
ices of the breast that the radiologist can view as a 3D reconstruction. The [facility] unit can perform
 full-field digital mammograms (2D imaging); or DBT (3D imaging); or both, in a combination mode that
 quickly performs both the mammogram and the tomosynthesis scan while the breast is still compressed.


PQRS 6045F: Fluoroscopic imaging is not utilized for breast tomosynthesis.



TECHNICAL DOCUMENTATION:  FINDING NUMBER: (1)

ASSESSMENT:  (1)

JOB ID:  [id number]

 2011 [employer]- All Rights Reserved

## 2018-01-19 ENCOUNTER — HOSPITAL ENCOUNTER (INPATIENT)
Dept: HOSPITAL 62 - ER | Age: 73
LOS: 5 days | Discharge: HOME | DRG: 93 | End: 2018-01-24
Attending: INTERNAL MEDICINE | Admitting: INTERNAL MEDICINE
Payer: MEDICARE

## 2018-01-19 DIAGNOSIS — T43.215A: ICD-10-CM

## 2018-01-19 DIAGNOSIS — I25.10: ICD-10-CM

## 2018-01-19 DIAGNOSIS — Z95.1: ICD-10-CM

## 2018-01-19 DIAGNOSIS — Z88.0: ICD-10-CM

## 2018-01-19 DIAGNOSIS — T46.5X5A: ICD-10-CM

## 2018-01-19 DIAGNOSIS — F32.9: ICD-10-CM

## 2018-01-19 DIAGNOSIS — E78.5: ICD-10-CM

## 2018-01-19 DIAGNOSIS — I11.0: ICD-10-CM

## 2018-01-19 DIAGNOSIS — Z95.810: ICD-10-CM

## 2018-01-19 DIAGNOSIS — R42: ICD-10-CM

## 2018-01-19 DIAGNOSIS — I25.2: ICD-10-CM

## 2018-01-19 DIAGNOSIS — R29.6: ICD-10-CM

## 2018-01-19 DIAGNOSIS — J44.9: ICD-10-CM

## 2018-01-19 DIAGNOSIS — T42.6X5A: ICD-10-CM

## 2018-01-19 DIAGNOSIS — K21.9: ICD-10-CM

## 2018-01-19 DIAGNOSIS — T50.2X5A: ICD-10-CM

## 2018-01-19 DIAGNOSIS — Z86.73: ICD-10-CM

## 2018-01-19 DIAGNOSIS — Z90.49: ICD-10-CM

## 2018-01-19 DIAGNOSIS — I50.9: ICD-10-CM

## 2018-01-19 DIAGNOSIS — R27.0: Primary | ICD-10-CM

## 2018-01-19 LAB
ADD MANUAL DIFF: NO
ALBUMIN SERPL-MCNC: 4.3 G/DL (ref 3.5–5)
ALP SERPL-CCNC: 83 U/L (ref 38–126)
ALT SERPL-CCNC: 18 U/L (ref 9–52)
ANION GAP SERPL CALC-SCNC: 12 MMOL/L (ref 5–19)
APPEARANCE UR: CLEAR
APTT BLD: 28.4 SEC (ref 23.5–35.8)
APTT PPP: YELLOW S
AST SERPL-CCNC: 17 U/L (ref 14–36)
BASOPHILS # BLD AUTO: 0 10^3/UL (ref 0–0.2)
BASOPHILS NFR BLD AUTO: 0.5 % (ref 0–2)
BILIRUB DIRECT SERPL-MCNC: 0.3 MG/DL (ref 0–0.4)
BILIRUB SERPL-MCNC: 0.4 MG/DL (ref 0.2–1.3)
BILIRUB UR QL STRIP: NEGATIVE
BUN SERPL-MCNC: 25 MG/DL (ref 7–20)
CALCIUM: 9.7 MG/DL (ref 8.4–10.2)
CHLORIDE SERPL-SCNC: 109 MMOL/L (ref 98–107)
CK MB SERPL-MCNC: 0.35 NG/ML (ref ?–4.55)
CO2 SERPL-SCNC: 25 MMOL/L (ref 22–30)
EOSINOPHIL # BLD AUTO: 0.1 10^3/UL (ref 0–0.6)
EOSINOPHIL NFR BLD AUTO: 1.5 % (ref 0–6)
ERYTHROCYTE [DISTWIDTH] IN BLOOD BY AUTOMATED COUNT: 16.1 % (ref 11.5–14)
GLUCOSE SERPL-MCNC: 102 MG/DL (ref 75–110)
GLUCOSE UR STRIP-MCNC: NEGATIVE MG/DL
HCT VFR BLD CALC: 34.3 % (ref 36–47)
HGB BLD-MCNC: 11.3 G/DL (ref 12–15.5)
INR PPP: 0.99
KETONES UR STRIP-MCNC: NEGATIVE MG/DL
LYMPHOCYTES # BLD AUTO: 1.3 10^3/UL (ref 0.5–4.7)
LYMPHOCYTES NFR BLD AUTO: 37.8 % (ref 13–45)
MCH RBC QN AUTO: 30.1 PG (ref 27–33.4)
MCHC RBC AUTO-ENTMCNC: 33.1 G/DL (ref 32–36)
MCV RBC AUTO: 91 FL (ref 80–97)
MONOCYTES # BLD AUTO: 0.2 10^3/UL (ref 0.1–1.4)
MONOCYTES NFR BLD AUTO: 5.2 % (ref 3–13)
NEUTROPHILS # BLD AUTO: 1.9 10^3/UL (ref 1.7–8.2)
NEUTS SEG NFR BLD AUTO: 55 % (ref 42–78)
NITRITE UR QL STRIP: NEGATIVE
PH UR STRIP: 5 [PH] (ref 5–9)
PLATELET # BLD: 197 10^3/UL (ref 150–450)
POTASSIUM SERPL-SCNC: 4.2 MMOL/L (ref 3.6–5)
PROT SERPL-MCNC: 6.7 G/DL (ref 6.3–8.2)
PROT UR STRIP-MCNC: NEGATIVE MG/DL
PROTHROMBIN TIME: 13.8 SEC (ref 11.4–15.4)
RBC # BLD AUTO: 3.76 10^6/UL (ref 3.72–5.28)
SODIUM SERPL-SCNC: 146.3 MMOL/L (ref 137–145)
SP GR UR STRIP: 1.02
TOTAL CELLS COUNTED % (AUTO): 100 %
TROPONIN I SERPL-MCNC: < 0.012 NG/ML
UROBILINOGEN UR-MCNC: NEGATIVE MG/DL (ref ?–2)
WBC # BLD AUTO: 3.4 10^3/UL (ref 4–10.5)

## 2018-01-19 PROCEDURE — 80061 LIPID PANEL: CPT

## 2018-01-19 PROCEDURE — 99285 EMERGENCY DEPT VISIT HI MDM: CPT

## 2018-01-19 PROCEDURE — 80053 COMPREHEN METABOLIC PANEL: CPT

## 2018-01-19 PROCEDURE — 82553 CREATINE MB FRACTION: CPT

## 2018-01-19 PROCEDURE — 93005 ELECTROCARDIOGRAM TRACING: CPT

## 2018-01-19 PROCEDURE — 80048 BASIC METABOLIC PNL TOTAL CA: CPT

## 2018-01-19 PROCEDURE — 70460 CT HEAD/BRAIN W/DYE: CPT

## 2018-01-19 PROCEDURE — 70450 CT HEAD/BRAIN W/O DYE: CPT

## 2018-01-19 PROCEDURE — 83735 ASSAY OF MAGNESIUM: CPT

## 2018-01-19 PROCEDURE — 71046 X-RAY EXAM CHEST 2 VIEWS: CPT

## 2018-01-19 PROCEDURE — 85025 COMPLETE CBC W/AUTO DIFF WBC: CPT

## 2018-01-19 PROCEDURE — 81001 URINALYSIS AUTO W/SCOPE: CPT

## 2018-01-19 PROCEDURE — 85730 THROMBOPLASTIN TIME PARTIAL: CPT

## 2018-01-19 PROCEDURE — 85610 PROTHROMBIN TIME: CPT

## 2018-01-19 PROCEDURE — 93010 ELECTROCARDIOGRAM REPORT: CPT

## 2018-01-19 PROCEDURE — 82550 ASSAY OF CK (CPK): CPT

## 2018-01-19 PROCEDURE — 36415 COLL VENOUS BLD VENIPUNCTURE: CPT

## 2018-01-19 PROCEDURE — 84484 ASSAY OF TROPONIN QUANT: CPT

## 2018-01-19 RX ADMIN — AMLODIPINE BESYLATE SCH MG: 5 TABLET ORAL at 22:56

## 2018-01-19 RX ADMIN — CARVEDILOL SCH MG: 12.5 TABLET, FILM COATED ORAL at 22:57

## 2018-01-19 RX ADMIN — HYDRALAZINE HYDROCHLORIDE SCH MG: 50 TABLET, FILM COATED ORAL at 22:56

## 2018-01-19 RX ADMIN — RANOLAZINE SCH MG: 500 TABLET, FILM COATED, EXTENDED RELEASE ORAL at 22:55

## 2018-01-19 RX ADMIN — Medication SCH ML: at 23:12

## 2018-01-19 RX ADMIN — ATORVASTATIN CALCIUM SCH MG: 40 TABLET, FILM COATED ORAL at 22:57

## 2018-01-19 RX ADMIN — FAMOTIDINE SCH MG: 20 TABLET, FILM COATED ORAL at 22:56

## 2018-01-19 NOTE — RADIOLOGY REPORT (SQ)
EXAM DESCRIPTION:  CHEST PA/LAT



COMPLETED DATE/TIME:  1/19/2018 11:25 am



REASON FOR STUDY:  weakness



COMPARISON:  Two-view chest 2/17/2017, 8/4/2017

AP chest 8/23/2017



EXAM PARAMETERS:  NUMBER OF VIEWS: two views

TECHNIQUE: Digital Frontal and Lateral radiographic views of the chest acquired.

RADIATION DOSE: NA

LIMITATIONS: none



FINDINGS:  LUNGS AND PLEURA: No opacities, masses or pneumothorax. No pleural effusion.

MEDIASTINUM AND HILAR STRUCTURES: No masses or contour abnormalities.

HEART AND VASCULAR STRUCTURES: Stable moderate to marked cardiomegaly

BONES: No acute findings.

HARDWARE: Left-sided pacemaker/ defibrillator with abandoned leads, temporary pacemaker leads, radiop
aque coronary stents

OTHER: No other significant finding.



IMPRESSION:  No acute findings.

Cardiomegaly with pacemaker and other cardiac support hardware



TECHNICAL DOCUMENTATION:  JOB ID:  [id number]

 2011 [employer]- All Rights Reserved

## 2018-01-19 NOTE — RADIOLOGY REPORT (SQ)
EXAM DESCRIPTION:  CT HEAD WITH



COMPLETED DATE/TIME:  1/19/2018 4:13 pm



REASON FOR STUDY:  ataxia



COMPARISON:  CT brain without contrast earlier today

CT brain 10/10/2017, 5/21/2017, 4/14/2016



TECHNIQUE:  Axial images acquired through the brain with intravenous contrast. Images reviewed with b
one, brain and subdural windows. Images stored on PACS.

All CT scanners at this facility use dose modulation, iterative reconstruction, and/or weight based d
osing when appropriate to reduce radiation dose to as low as reasonably achievable (ALARA).

[facility]: Dose Right  [facility]: CareDose    [facility]: Dose Right    [facility]: Teradose 4D    [facility]: Smart Technologies



CONTRAST TYPE AND DOSE:  contrast/concentration: Isovue 370.00 mg/ml; Total Contrast Delivered: 50.0 
ml; Total Saline Delivered: 50.0 ml



RENAL FUNCTION:  Creatinine 1.2



RADIATION DOSE:  CT Rad equipment meets quality standard of care and radiation dose reduction techniq
ues were employed. CTDIvol: 64.6 mGy. DLP: 1163 mGy-cm..



LIMITATIONS:  None.



FINDINGS:  VENTRICLES: Normal size and contour.

CEREBRUM: No masses. No hemorrhage. No midline shift. Normal gray/white matter differentiation. No ev
idence for acute infarction. No enhancing lesions.

CEREBELLUM: No masses. No hemorrhage.  No evidence for acute infarction. No enhancing lesions.  Old l
acunar infarct in the right upper cerebellar hemisphere unchanged.

EXTRA-AXIAL SPACES: No fluid collections. No enhancing lesions.

ORBITS AND GLOBE: No intra- or extraconal masses. Normal contour of globe without masses.

CALVARIUM: No fracture.

PARANASAL SINUSES: No fluid or mucosal thickening.

SOFT TISSUES: No mass or hematoma.

OTHER: Heavily calcified [place] vessels.  There is contrast enhancement in the vertebral arter
ies and basilar artery



IMPRESSION:  No CT evidence of acute findings.

Right superior cerebellar hemisphere lacunar infarct, chronic

EVIDENCE OF ACUTE STROKE: NO.



TECHNICAL DOCUMENTATION:  JOB ID:  [id number]

Quality ID # [id number]: Final reports with documentation of one or more dose reduction techniques (e.g., Au
tomated exposure control, adjustment of the mA and/or kV according to patient size, use of iterative 
reconstruction technique)

 2011 [employer]- All Rights Reserved

## 2018-01-19 NOTE — PDOC H&P
History of Present Illness


Admission Date/PCP: 


  18 17:42





  CLAYTON CABALLERO MD





History of Present Illness: 


JAYCEE FLOWERS is a 72 year old female patient of Dr Clayton Caballero who 

presented to the ED with complain of generalized weakness and feeling like she 

was going to fall backward. Patient reported to me that since 2017 she 

has been more involved in caring for her  due to his functional 

limitation. She claimed that she most have overworked herself and caused her 

weakness. She claimed that she was Dr. Caballero about 2 months ago due to issue of 

loss of balance and she was prescribed a cane for ambulatory assistance. She 

reported associated dizziness, intermittent vertigo and recurrent falls without 

any trauma since 2017. She reported sternal region chest pressure like 

discomfort without radiation, associated palpitation, radiation, or 

diaphoresis. She denied any loss of consciousness, headache, nausea, vomiting, 

abdominal pain, or diarrhea. She admitted to history of hypertension but denied 

any history of stroke, seizure disorder, or focal weakness. She claimed 

compliance with her medication and medical care follow up with Dr. Caballero. Upon 

arrival in the ED her initial evaluation was significant for slightly elevated 

blood pressure and physical examination suggestive of left sided drift, 

unsteadiness in balance and difficulty with ambulation. Her initial radiology 

evaluation with head CT scan was unrevealing of any significant acute 

pathologic process. She was not able to get brain MRI evaluation due to AICD 

implant device. Her morbidities include HTN, CAD s/p CABG, chronic CHF, old MI, 

Htyperlipidemia, COPD, GERD, and Depression. She was advised hospitalization 

for further evaluation and management.





Past Medical History


Cardiac Medical History: Reports: Congestive Heart Failure, Coronary Artery 

Disease, Myocardial Infarction - , Hyperlipidema, Hypertension


   Denies: Atrial Fibrillation, Peripheral Vascular Disease, Pulmonary Embolism

, Heart Murmur


Pulmonary Medical History: Reports: Bronchitis, Chronic Obstructive Pulmonary 

Disease (COPD), Pneumonia


   Denies: Asthma, Respiratory Failure, Sleep Apnea, Tuberculosis


Neurological Medical History: Reports: None


   Denies: Seizures


Endocrine Medical History: 


   Denies: Diabetes Mellitus Type 1, Diabetes Mellitus Type 2


Renal/ Medical History: 


   Denies: End Stage Renal Disease


Malignancy Medical History: 


   Denies: Leukemia, Lung Cancer


GI Medical History: Reports: Crohn's Disease - Patient has either Crohn's 

disease or ulcerative colitis, Diverticulitis, Gastroesophageal Reflux Disease


   Denies: Hepatitis, Hiatal Hernia


Musculoskeltal Medical History: Reports: Arthritis


   Denies: Fibromyalgia


Psychiatric Medical History: Reports: Depression


   Denies: Bipolar Disorder, Post Traumatic Stress Disorder


Hematology: 


   Denies: Anemia, Hemophilia, Sickle Cell Disease


Infectious Medical History: 


   Denies: HIV





Past Surgical History


Past Surgical History: Reports: Appendectomy, Cardiac Catheterization,  

Section - x4, Coronary Stent, Hysterectomy, Orthopedic Surgery - bilateral knee 

surgery, Pacemaker, Tonsillectomy


   Denies: Amputation, Cholecystectomy, Colostomy, Coronary Artery Bypass Graft

, Gastric Bypass Surgery, Herniorrhaphy, Mastectomy, Tubal Ligation





Social History


Lives with: Family


Smoking Status: Current Every Day Smoker


Frequency of Alcohol Use: None


Hx Recreational Drug Use: No


Drugs: None


Hx Prescription Drug Abuse: No





Family History


Family History: Reviewed & Not Pertinent, Hypertension


Parental Family History Reviewed: Yes


Children Family History Reviewed: Yes


Sibling(s) Family History Reviewed.: Yes





Medication/Allergy


Allergies/Adverse Reactions: 


 





amoxicillin [Amoxicillin] Allergy (Intermediate, Verified 17 16:46)


 Hallucinations


Penicillins Allergy (Intermediate, Verified 17 16:46)


 Hallucinations











Review of Systems


All systems: reviewed and no additional remarkable complaints except as stated





Physical Exam


Vital Signs: 


 











Temp Pulse Resp BP Pulse Ox


 


 97.8 F   57 L  16   152/75 H  96 


 


 18 10:43  18 10:43  18 15:00  18 14:01  18 15:00











General appearance: PRESENT: no acute distress, well-developed, well-nourished


Head exam: PRESENT: atraumatic, normocephalic


Eye exam: PRESENT: conjunctiva pink, EOMI, PERRLA.  ABSENT: scleral icterus


Ear exam: PRESENT: normal external ear exam


Mouth exam: PRESENT: moist, tongue midline


Neck exam: PRESENT: full ROM.  ABSENT: carotid bruit, JVD, lymphadenopathy, 

thyromegaly


Respiratory exam: PRESENT: clear to auscultation ximena, other - AICD implant 

device subcutenously over left anterior chest wall.


Cardiovascular exam: PRESENT: RRR.  ABSENT: diastolic murmur, rubs, systolic 

murmur


Pulses: PRESENT: normal dorsalis pedis pul, +2 pedal pulses bilateral


Vascular exam: PRESENT: normal capillary refill.  ABSENT: pallor


GI/Abdominal exam: PRESENT: normal bowel sounds, soft.  ABSENT: distended, 

guarding, mass, organolmegaly, rebound, tenderness


Rectal exam: PRESENT: deferred


Extremities exam: ABSENT: pedal edema


Musculoskeletal exam: PRESENT: normal inspection.  ABSENT: ambulatory - limited 

due to reported loss of balance with attempted effort at ambulating patient in 

the ED.


Neurological exam: PRESENT: alert, awake, oriented to person, oriented to place

, oriented to time, oriented to situation, CN II-XII grossly intact.  ABSENT: 

motor sensory deficit


Psychiatric exam: PRESENT: appropriate affect, normal mood.  ABSENT: homicidal 

ideation, suicidal ideation


Skin exam: PRESENT: dry, intact, warm.  ABSENT: cyanosis, rash





Results


Laboratory Results: 





I reviewed her lab results on [facility] and form significant part of my decision 

making on this case.


Impressions: 


 





Chest X-Ray  18 10:54


IMPRESSION:  No acute findings.


Cardiomegaly with pacemaker and other cardiac support hardware


 








Head CT  18 14:05


IMPRESSION:  No CT evidence of acute findings.


Right superior cerebellar hemisphere lacunar infarct, chronic


EVIDENCE OF ACUTE STROKE: NO.


 














Assessment & Plan





- Diagnosis


(1) Ataxia


Is this a current diagnosis for this admission?: Yes   


Plan: 


Probably related to her old lacuna infarct.








(2) Old lacunar stroke without late effect


Is this a current diagnosis for this admission?: Yes   


Plan: 


See admitting attending orders.








(3) HTN (hypertension)


Qualifiers: 


   Hypertension type: essential hypertension   Qualified Code(s): I10 - 

Essential (primary) hypertension   


Is this a current diagnosis for this admission?: Yes   


Plan: 


See admitting attending orders.








(4) Coronary artery disease


Qualifiers: 


   Coronary Disease-Associated Artery/Lesion type: unspecified vessel or lesion 

type   Associated angina: without angina 


Is this a current diagnosis for this admission?: Yes   


Plan: 


See admitting attending orders.








(5) Old MI (myocardial infarction)


Is this a current diagnosis for this admission?: Yes   


Plan: 


See admitting attending orders.








(6) AICD (automatic cardioverter/defibrillator) present


Is this a current diagnosis for this admission?: Yes   


Plan: 


See admitting attending orders.








(7) HLD (hyperlipidemia)


Qualifiers: 


   Hyperlipidemia type: unspecified   Qualified Code(s): E78.5 - Hyperlipidemia

, unspecified   


Is this a current diagnosis for this admission?: Yes   


Plan: 


See admitting attending orders.








(8) COPD (chronic obstructive pulmonary disease)


Qualifiers: 


   COPD type: chronic bronchitis   Qualified Code(s): J44.9 - Chronic 

obstructive pulmonary disease, unspecified   


Is this a current diagnosis for this admission?: Yes   


Plan: 


See admitting attending orders.








(9) GERD (gastroesophageal reflux disease)


Qualifiers: 


   Esophagitis presence: without esophagitis   Qualified Code(s): K21.9 - Gastro

-esophageal reflux disease without esophagitis   


Is this a current diagnosis for this admission?: Yes   


Plan: 


See admitting attending orders.








- Time


Time Spent: 50 to 70 Minutes


Medications reviewed and adjusted accordingly: Yes


Anticipated discharge: Home with Homehealth


Within: Other





- Inpatient Certification


Based on my medical assessment, after consideration of the patient's 

comorbidities, presenting symptoms, or acuity I expect that the services needed 

warrant INPATIENT care.: Yes


I certify that my determination is in accordance with my understanding of 

Medicare's requirements for reasonable and necessary INPATIENT services [42 CFR 

412.3e].: Yes


Medical Necessity: Need Close Monitoring Due to Risk of Patient Decompensation, 

Need For Continuous Telemetry Monitoring, Risk of Complication if Not Cared For 

in Hospital


Post Hospital Care: D/C Planner Documentation





- Plan Summary


Plan Summary: 





See admitting physician orders.

## 2018-01-19 NOTE — ER DOCUMENT REPORT
ED General





- General


Chief Complaint: General Weakness


Stated Complaint: WEAKNESS


Time Seen by Provider: 18 10:53


Information source: Patient


TRAVEL OUTSIDE OF THE [place] IN LAST 30 DAYS: No





- HPI


Patient complains to provider of:  I am having difficulty walking


Onset: Last week


Onset/Duration: Gradual


Quality of pain: No pain


Associated symptoms: Chest pain


Exacerbated by: Walking


Relieved by: Denies


Similar symptoms previously: No


Recently seen / treated by doctor: No


Notes: 





Patient states that lately when she ambulates she is dizzy and feels like she 

may fall backwards.  She has fallen but denies hitting her head or loss of 

consciousness.  She also complains of this centralized chest pressure that is 

intermittent in nature and not radiating.  She denies aggravating or 

alleviating factors for her pain.  She states that her  recently had 

surgery and she has been helping him.





- Related Data


Allergies/Adverse Reactions: 


 





amoxicillin [Amoxicillin] Allergy (Intermediate, Verified 17 16:46)


 Hallucinations


Penicillins Allergy (Intermediate, Verified 17 16:46)


 Hallucinations











Past Medical History





- General


Information source: Patient





- Social History


Smoking Status: Current Every Day Smoker


Chew tobacco use (# tins/day): No


Frequency of alcohol use: None


Drug Abuse: None


Lives with: Family


Family History: Reviewed & Not Pertinent, Hypertension


Patient has suicidal ideation: No


Patient has homicidal ideation: No





- Past Medical History


Cardiac Medical History: Reports: Hx Congestive Heart Failure, Hx Coronary 

Artery Disease, Hx Heart Attack - , Hx Hypercholesterolemia, Hx Hypertension


   Denies: Hx Atrial Fibrillation, Hx Peripheral Vascular Disease, Hx Pulmonary 

Embolism, Hx Heart Murmur


Pulmonary Medical History: Reports: Hx Bronchitis, Hx COPD, Hx Pneumonia


   Denies: Hx Asthma, Hx Respiratory Failure, Hx Sleep Apnea, Hx Tuberculosis


Neurological Medical History: Reports: None.  Denies: Hx Cerebrovascular 

Accident, Hx Seizures


Endocrine Medical History: Denies: Hx Diabetes Mellitus Type 1, Hx Diabetes 

Mellitus Type 2


Renal/ Medical History: Reports: Hx Renal Insufficiency.  Denies: Hx End 

Stage Renal Disease, Hx Kidney Stones, Hx Peritoneal Dialysis


Malignancy Medical History: Denies: Hx Leukemia, Hx Lung Cancer


GI Medical History: Reports: Hx Crohn's Disease - Patient has either Crohn's 

disease or ulcerative colitis, Hx Diverticulitis, Hx Gastroesophageal Reflux 

Disease.  Denies: Hx Hepatitis, Hx Hiatal Hernia, Hx Irritable Bowel, Hx Liver 

Failure, Hx Pancreatitis, Hx Ulcer


Musculoskeltal Medical History: Reports Hx Arthritis, Denies Hx Fibromyalgia, 

Denies Hx Muscular Dystrophy


Psychiatric Medical History: Reports: Hx Depression


   Denies: Hx Bipolar Disorder, Hx Post Traumatic Stress Disorder, Hx 

Schizophrenia


Traumatic Medical History: Reports: Hx Fractures - right wrist, left foot


Infectious Medical History: Denies: Hx Hepatitis, Hx HIV


Past Surgical History: Reports: Hx Appendectomy, Hx Cardiac Catheterization, Hx 

Cardiac Surgery - AICD for ventricular fibrillation, Hx  Section - x4, 

Hx Coronary Stent, Hx Hysterectomy, Hx Orthopedic Surgery - bilateral knee 

surgery, Hx Pacemaker, Hx Tonsillectomy.  Denies: Hx Bowel Surgery, Hx 

Cholecystectomy, Hx Colostomy, Hx Coronary Artery Bypass Graft, Hx Gastric 

Bypass Surgery, Hx Herniorrhaphy, Hx Mastectomy, Hx Open Heart Surgery, Hx 

Tubal Ligation





- Immunizations


Immunizations up to date: Yes


Hx Diphtheria, Pertussis, Tetanus Vaccination: Yes


Hx Pneumococcal Vaccination: 01/01/10





Review of Systems





- Review of Systems


Constitutional: No symptoms reported


EENT: No symptoms reported


Cardiovascular: See HPI, Chest pain


Respiratory: No symptoms reported


Gastrointestinal: No symptoms reported


Genitourinary: No symptoms reported


Female Genitourinary: No symptoms reported


Musculoskeletal: No symptoms reported


Skin: No symptoms reported


Hematologic/Lymphatic: No symptoms reported


Neurological/Psychological: Gait changes





Physical Exam





- Vital signs


Vitals: 


 











Temp Pulse Resp BP Pulse Ox


 


 97.8 F   57 L  18   149/70 H  97 


 


 18 10:43  18 10:43  18 10:43  18 10:43  18 10:43














- Notes


Notes: 





PHYSICAL EXAMINATION:





GENERAL: Well-appearing, well-nourished and in no acute distress.





HEAD: Atraumatic, normocephalic.





EYES: Pupils equal round and reactive to light, extraocular movements intact, 

conjunctiva are normal.  No nystagmus.





ENT: Nares patent, oropharynx clear without exudates.  Moist mucous membranes.





NECK: Normal range of motion, supple without lymphadenopathy





LUNGS: Breath sounds clear to auscultation bilaterally and equal.  No wheezes 

rales or rhonchi.





HEART: Regular rate and rhythm without murmurs.  AICD left anterior chest wall 

no signs or symptoms of infection





ABDOMEN: Soft, nontender, nondistended abdomen.  No guarding, no rebound.  No 

masses appreciated.





Female : deferred





Musculoskeletal: Normal range of motion, no pitting or edema.  No cyanosis.





NEUROLOGICAL: Cranial nerves grossly intact.  Normal speech.  Upon standing the 

patient up in trying to ambulate her she does get off balance and feels like 

she is going to fall backwards.  Finger to nose is mildly off in the left.  She 

has no pronator drift.  I was unable to test heel to shin due to her being off 

balance.  Normal sensory, motor exams





PSYCH: Normal mood, normal affect.





SKIN: Warm, Dry, normal turgor, no rashes or lesions noted.





Course





- Re-evaluation


Re-evalutation: 





18 16:46


CTA head no acute old cerebellar infarct


18 16:52





18 17:12


I did talk to Dr. jacobs. He stated to call Dr. Rene-I did and he accepted 

patient.





- Vital Signs


Vital signs: 


 











Temp Pulse Resp BP Pulse Ox


 


 97.8 F   57 L  16   152/75 H  96 


 


 18 10:43  18 10:43  18 15:00  18 14:01  18 15:00














- Laboratory


Result Diagrams: 


 18 11:37





 18 11:37


Laboratory results interpreted by me: 


 











  18





  11:37 11:37


 


WBC  3.4 L 


 


Hgb  11.3 L 


 


Hct  34.3 L 


 


RDW  16.1 H 


 


Sodium   146.3 H


 


Chloride   109 H


 


BUN   25 H


 


Est GFR ( Amer)   54 L


 


Est GFR (Non-Af Amer)   45 L














- Diagnostic Test


Radiology reviewed: Image reviewed, Reports reviewed


Radiology results interpreted by me: 





18 16:47


no acute on CXR





- EKG Interpretation by Me


EKG shows normal: Sinus rhythm


Rate: Bradycardia - 53


When compared to previous EKG there are: No significant change


Additional EKG results interpreted by me: 





18 13:05


Patient does have T-wave inversions anterior laterally there is no acute change 

when compared with EKG on 10/9/2007.





Discharge





- Discharge


Clinical Impression: 


 Ataxia, CVA (cerebral vascular accident)





Condition: Good


Disposition: ADMITTED AS INPATIENT


Admitting Provider: [facility]


Unit Admitted: Telemetry


Referrals: 


BISMARK CABALLERO MD [Primary Care Provider] - Follow up as needed

## 2018-01-19 NOTE — RADIOLOGY REPORT (SQ)
EXAM DESCRIPTION:  CT HEAD WITHOUT



COMPLETED DATE/TIME:  1/19/2018 12:37 pm



REASON FOR STUDY:  ataxia



COMPARISON:  12 prior CT brain exams since 2007, most recently 10/10/2017



TECHNIQUE:  Axial images acquired through the brain without intravenous contrast.  Images reviewed wi
th bone, brain and subdural windows.  Images stored on PACS.

All CT scanners at this facility use dose modulation, iterative reconstruction, and/or weight based d
osing when appropriate to reduce radiation dose to as low as reasonably achievable (ALARA).

[facility]: Dose Right  [facility]: CareDose    [facility]: Dose Right    [facility]: Teradose 4D    [facility]: [employer]



RADIATION DOSE:  CT Rad equipment meets quality standard of care and radiation dose reduction techniq
ues were employed. CTDIvol: 64.6 mGy. DLP: 1163 mGy-cm. mGy.



LIMITATIONS:  None.



FINDINGS:  VENTRICLES: Normal size and contour.

CEREBRUM: No masses.  No hemorrhage.  No midline shift.  No evidence for acute infarction. Normal gra
y/white matter differentiation. No areas of low density in the white matter.  Stable punctate calcifi
cation right posterior frontal subcortical white matter of doubtful clinical significance, unchanged 
since 2007.

CEREBELLUM: 5 mm focus of low attenuation right upper cerebellar hemisphere axial image 14, slightly 
smaller than on 5/21/2017 likely a small chronic lacunar infarct.  No posterior fossa acute ischemic 
change, hemorrhage, or shift.  Heavily calcified distal intracranial vertebral arteries right greater
 than left

EXTRAAXIAL SPACES: No fluid collections.  No masses.

ORBITS AND GLOBE: No intra- or extraconal masses.  Normal contour of globe without masses.  Post bila
teral cataract surgery

CALVARIUM: No fracture.

PARANASAL SINUSES: No fluid or mucosal thickening.

SOFT TISSUES: No mass or hematoma.

OTHER: No other significant finding.



IMPRESSION:  No acute findings.

Punctate chronic lacunar infarct right cerebellar hemisphere

EVIDENCE OF ACUTE STROKE: NO.



COMMENT:  Quality ID # [id number]: Final reports with documentation of one or more dose reduction techniques
 (e.g., Automated exposure control, adjustment of the mA and/or kV according to patient size, use of 
iterative reconstruction technique)



TECHNICAL DOCUMENTATION:  JOB ID:  [id number]

 2011 [employer]- All Rights Reserved

## 2018-01-20 LAB
ADD MANUAL DIFF: NO
ALBUMIN SERPL-MCNC: 3.6 G/DL (ref 3.5–5)
ALP SERPL-CCNC: 68 U/L (ref 38–126)
ALT SERPL-CCNC: 20 U/L (ref 9–52)
ANION GAP SERPL CALC-SCNC: 7 MMOL/L (ref 5–19)
AST SERPL-CCNC: 14 U/L (ref 14–36)
BASOPHILS # BLD AUTO: 0 10^3/UL (ref 0–0.2)
BASOPHILS NFR BLD AUTO: 0.1 % (ref 0–2)
BILIRUB DIRECT SERPL-MCNC: 0.2 MG/DL (ref 0–0.4)
BILIRUB SERPL-MCNC: 0.5 MG/DL (ref 0.2–1.3)
BUN SERPL-MCNC: 22 MG/DL (ref 7–20)
CALCIUM: 9.8 MG/DL (ref 8.4–10.2)
CHLORIDE SERPL-SCNC: 106 MMOL/L (ref 98–107)
CHOLEST SERPL-MCNC: 133.31 MG/DL (ref 0–200)
CK MB SERPL-MCNC: 0.3 NG/ML (ref ?–4.55)
CK MB SERPL-MCNC: < 0.22 NG/ML (ref ?–4.55)
CO2 SERPL-SCNC: 28 MMOL/L (ref 22–30)
EOSINOPHIL # BLD AUTO: 0 10^3/UL (ref 0–0.6)
EOSINOPHIL NFR BLD AUTO: 1 % (ref 0–6)
ERYTHROCYTE [DISTWIDTH] IN BLOOD BY AUTOMATED COUNT: 16 % (ref 11.5–14)
GLUCOSE SERPL-MCNC: 80 MG/DL (ref 75–110)
HCT VFR BLD CALC: 33.5 % (ref 36–47)
HGB BLD-MCNC: 11.2 G/DL (ref 12–15.5)
LDLC SERPL DIRECT ASSAY-MCNC: 65 MG/DL (ref ?–100)
LYMPHOCYTES # BLD AUTO: 1.2 10^3/UL (ref 0.5–4.7)
LYMPHOCYTES NFR BLD AUTO: 37.7 % (ref 13–45)
MCH RBC QN AUTO: 30.4 PG (ref 27–33.4)
MCHC RBC AUTO-ENTMCNC: 33.5 G/DL (ref 32–36)
MCV RBC AUTO: 91 FL (ref 80–97)
MONOCYTES # BLD AUTO: 0.2 10^3/UL (ref 0.1–1.4)
MONOCYTES NFR BLD AUTO: 5.5 % (ref 3–13)
NEUTROPHILS # BLD AUTO: 1.7 10^3/UL (ref 1.7–8.2)
NEUTS SEG NFR BLD AUTO: 55.7 % (ref 42–78)
PLATELET # BLD: 189 10^3/UL (ref 150–450)
POTASSIUM SERPL-SCNC: 4 MMOL/L (ref 3.6–5)
PROT SERPL-MCNC: 5.8 G/DL (ref 6.3–8.2)
RBC # BLD AUTO: 3.68 10^6/UL (ref 3.72–5.28)
SODIUM SERPL-SCNC: 141.4 MMOL/L (ref 137–145)
TOTAL CELLS COUNTED % (AUTO): 100 %
TRIGL SERPL-MCNC: 44 MG/DL (ref ?–150)
TROPONIN I SERPL-MCNC: 0.01 NG/ML
TROPONIN I SERPL-MCNC: 0.02 NG/ML
VLDLC SERPL CALC-MCNC: 9 MG/DL (ref 10–31)
WBC # BLD AUTO: 3.1 10^3/UL (ref 4–10.5)

## 2018-01-20 RX ADMIN — ASPIRIN SCH MG: 81 TABLET, COATED ORAL at 09:16

## 2018-01-20 RX ADMIN — DEXAMETHASONE SODIUM PHOSPHATE PRN MG: 10 INJECTION INTRAMUSCULAR; INTRAVENOUS at 14:10

## 2018-01-20 RX ADMIN — CARVEDILOL SCH MG: 12.5 TABLET, FILM COATED ORAL at 09:15

## 2018-01-20 RX ADMIN — AMLODIPINE BESYLATE SCH MG: 5 TABLET ORAL at 09:16

## 2018-01-20 RX ADMIN — ESCITALOPRAM OXALATE SCH MG: 10 TABLET, FILM COATED ORAL at 09:16

## 2018-01-20 RX ADMIN — Medication SCH ML: at 06:14

## 2018-01-20 RX ADMIN — DEXAMETHASONE SODIUM PHOSPHATE PRN MG: 10 INJECTION INTRAMUSCULAR; INTRAVENOUS at 22:03

## 2018-01-20 RX ADMIN — ATORVASTATIN CALCIUM SCH MG: 40 TABLET, FILM COATED ORAL at 22:05

## 2018-01-20 RX ADMIN — ENOXAPARIN SODIUM SCH MG: 40 INJECTION SUBCUTANEOUS at 09:17

## 2018-01-20 RX ADMIN — FAMOTIDINE SCH MG: 20 TABLET, FILM COATED ORAL at 09:15

## 2018-01-20 RX ADMIN — HYDROCHLOROTHIAZIDE SCH MG: 12.5 CAPSULE ORAL at 09:17

## 2018-01-20 RX ADMIN — AMLODIPINE BESYLATE SCH MG: 5 TABLET ORAL at 22:14

## 2018-01-20 RX ADMIN — LANSOPRAZOLE SCH MG: 30 TABLET, ORALLY DISINTEGRATING, DELAYED RELEASE ORAL at 06:14

## 2018-01-20 RX ADMIN — HYDRALAZINE HYDROCHLORIDE SCH MG: 50 TABLET, FILM COATED ORAL at 22:13

## 2018-01-20 RX ADMIN — HYDRALAZINE HYDROCHLORIDE SCH MG: 50 TABLET, FILM COATED ORAL at 13:35

## 2018-01-20 RX ADMIN — Medication SCH ML: at 22:05

## 2018-01-20 RX ADMIN — RANOLAZINE SCH MG: 500 TABLET, FILM COATED, EXTENDED RELEASE ORAL at 09:16

## 2018-01-20 RX ADMIN — Medication SCH ML: at 13:35

## 2018-01-20 RX ADMIN — FAMOTIDINE SCH MG: 20 TABLET, FILM COATED ORAL at 22:04

## 2018-01-20 RX ADMIN — HYDRALAZINE HYDROCHLORIDE SCH MG: 50 TABLET, FILM COATED ORAL at 06:14

## 2018-01-20 RX ADMIN — ISOSORBIDE MONONITRATE SCH MG: 60 TABLET, EXTENDED RELEASE ORAL at 08:09

## 2018-01-20 RX ADMIN — RANOLAZINE SCH MG: 500 TABLET, FILM COATED, EXTENDED RELEASE ORAL at 22:03

## 2018-01-20 RX ADMIN — CARVEDILOL SCH MG: 12.5 TABLET, FILM COATED ORAL at 22:14

## 2018-01-20 NOTE — EKG REPORT
SEVERITY:- ABNORMAL ECG -

SINUS RHYTHM

NONSPECIFIC T ABNORMALITIES, ANT-LAT LEADS

:

Confirmed by: Lety Pablo 20-Jan-2018 10:17:07

## 2018-01-20 NOTE — PDOC PROGRESS REPORT
Subjective


Progress Note for:: 01/20/18


Subjective:: 


Patient reported persistent dizziness although participated in physical therapy 

session earlier today with walker assistance. There is nausea but no vomiting. 

Tolerating oral feeding. No chest pain or shortness of breath. Blood pressure 

is better controlled.


Reason For Visit: 


ATAXIA;OLD LACUNA INFARCT;HYPERTENSION








Physical Exam


Vital Signs: 


 











Temp Pulse Resp BP Pulse Ox


 


 97.6 F   67   18   116/55 L  94 


 


 01/20/18 16:21  01/20/18 16:21  01/20/18 16:21  01/20/18 16:21  01/20/18 16:21








 Intake & Output











 01/19/18 01/20/18 01/21/18





 06:59 06:59 06:59


 


Intake Total  5 277


 


Output Total   250


 


Balance  5 27


 


Weight  60.4 kg 











General appearance: PRESENT: no acute distress, well-developed, well-nourished


Head exam: PRESENT: atraumatic, normocephalic


Mouth exam: PRESENT: moist


Respiratory exam: PRESENT: clear to auscultation ximena


Cardiovascular exam: PRESENT: RRR.  ABSENT: diastolic murmur, rubs, systolic 

murmur


Vascular exam: PRESENT: normal capillary refill.  ABSENT: pallor


GI/Abdominal exam: PRESENT: normal bowel sounds, soft.  ABSENT: distended, 

guarding, mass, organolmegaly, rebound, tenderness


Extremities exam: ABSENT: pedal edema


Musculoskeletal exam: PRESENT: normal inspection


Neurological exam: PRESENT: alert, awake, oriented to person, oriented to place

, oriented to time, oriented to situation, CN II-XII grossly intact.  ABSENT: 

motor sensory deficit


Psychiatric exam: PRESENT: appropriate affect, normal mood.  ABSENT: homicidal 

ideation, suicidal ideation


Skin exam: PRESENT: dry, intact, warm.  ABSENT: cyanosis, rash





Results


Laboratory Results: 


 





 01/20/18 07:20 





 01/20/18 07:20 





 











  01/20/18 01/20/18





  07:20 07:20


 


WBC  3.1 L 


 


RBC  3.68 L 


 


Hgb  11.2 L 


 


Hct  33.5 L 


 


MCV  91 


 


MCH  30.4 


 


MCHC  33.5 


 


RDW  16.0 H 


 


Plt Count  189 


 


Seg Neutrophils %  55.7 


 


Lymphocytes %  37.7 


 


Monocytes %  5.5 


 


Eosinophils %  1.0 


 


Basophils %  0.1 


 


Absolute Neutrophils  1.7 


 


Absolute Lymphocytes  1.2 


 


Absolute Monocytes  0.2 


 


Absolute Eosinophils  0.0 


 


Absolute Basophils  0.0 


 


Sodium   141.4


 


Potassium   4.0


 


Chloride   106


 


Carbon Dioxide   28


 


Anion Gap   7


 


BUN   22 H


 


Creatinine   1.42 H


 


Est GFR ( Amer)   44 L


 


Est GFR (Non-Af Amer)   36 L


 


Glucose   80


 


Calcium   9.8


 


Total Bilirubin   0.5


 


AST   14


 


ALT   20


 


Alkaline Phosphatase   68


 


Total Protein   5.8 L


 


Albumin   3.6


 


Triglycerides   44


 


Cholesterol   133.31


 


LDL Cholesterol Direct   65


 


VLDL Cholesterol   9.0 L


 


HDL Cholesterol   51








 











  01/19/18 01/19/18 01/20/18





  20:49 20:49 02:30


 


Creatine Kinase  44   47


 


CK-MB (CK-2)   0.35 


 


Troponin I   < 0.012 














  01/20/18 01/20/18 01/20/18





  02:30 09:32 09:32


 


Creatine Kinase    45


 


CK-MB (CK-2)  0.30  < 0.22 


 


Troponin I  0.021  0.013 











Impressions: 


 





Chest X-Ray  01/19/18 10:54


IMPRESSION:  No acute findings.


Cardiomegaly with pacemaker and other cardiac support hardware


 








Head CT  01/19/18 14:05


IMPRESSION:  No CT evidence of acute findings.


Right superior cerebellar hemisphere lacunar infarct, chronic


EVIDENCE OF ACUTE STROKE: NO.


 














Assessment & Plan





- Diagnosis


(1) Ataxia


Is this a current diagnosis for this admission?: Yes   





(2) Old lacunar stroke without late effect


Is this a current diagnosis for this admission?: Yes   





(3) HTN (hypertension)


Qualifiers: 


   Hypertension type: essential hypertension   Qualified Code(s): I10 - 

Essential (primary) hypertension   


Is this a current diagnosis for this admission?: Yes   





(4) Coronary artery disease


Qualifiers: 


   Coronary Disease-Associated Artery/Lesion type: unspecified vessel or lesion 

type   Associated angina: without angina 


Is this a current diagnosis for this admission?: Yes   





(5) Old MI (myocardial infarction)


Is this a current diagnosis for this admission?: Yes   





(6) AICD (automatic cardioverter/defibrillator) present


Is this a current diagnosis for this admission?: Yes   





(7) HLD (hyperlipidemia)


Qualifiers: 


   Hyperlipidemia type: unspecified   Qualified Code(s): E78.5 - Hyperlipidemia

, unspecified   


Is this a current diagnosis for this admission?: Yes   





(8) COPD (chronic obstructive pulmonary disease)


Qualifiers: 


   COPD type: chronic bronchitis   Qualified Code(s): J44.9 - Chronic 

obstructive pulmonary disease, unspecified   


Is this a current diagnosis for this admission?: Yes   





(9) GERD (gastroesophageal reflux disease)


Qualifiers: 


   Esophagitis presence: without esophagitis   Qualified Code(s): K21.9 - Gastro

-esophageal reflux disease without esophagitis   


Is this a current diagnosis for this admission?: Yes   





- Time


Time Spent with patient: 25-34 minutes


Medications reviewed and adjusted accordingly: Yes


Anticipated discharge: Home with Homehealth


Within: Other





- Inpatient Certification


Based on my medical assessment, after consideration of the patient's 

comorbidities, presenting symptoms, or acuity I expect that the services needed 

warrant INPATIENT care.: Yes


I certify that my determination is in accordance with my understanding of 

Medicare's requirements for reasonable and necessary INPATIENT services [42 CFR 

412.3e].: Yes


Medical Necessity: Need Close Monitoring Due to Risk of Patient Decompensation, 

Need For Continuous Telemetry Monitoring, Risk of Complication if Not Cared For 

in Hospital


Post Hospital Care: D/C Planner Documentation





- Plan Summary


Plan Summary: 





See attending physician orders.

## 2018-01-21 LAB
ANION GAP SERPL CALC-SCNC: 9 MMOL/L (ref 5–19)
BUN SERPL-MCNC: 33 MG/DL (ref 7–20)
CALCIUM: 9.6 MG/DL (ref 8.4–10.2)
CHLORIDE SERPL-SCNC: 104 MMOL/L (ref 98–107)
CO2 SERPL-SCNC: 26 MMOL/L (ref 22–30)
GLUCOSE SERPL-MCNC: 97 MG/DL (ref 75–110)
POTASSIUM SERPL-SCNC: 3.5 MMOL/L (ref 3.6–5)
SODIUM SERPL-SCNC: 139.3 MMOL/L (ref 137–145)

## 2018-01-21 RX ADMIN — HYDROCHLOROTHIAZIDE SCH MG: 12.5 CAPSULE ORAL at 12:35

## 2018-01-21 RX ADMIN — ESCITALOPRAM OXALATE SCH MG: 10 TABLET, FILM COATED ORAL at 10:16

## 2018-01-21 RX ADMIN — LANSOPRAZOLE SCH MG: 30 TABLET, ORALLY DISINTEGRATING, DELAYED RELEASE ORAL at 05:25

## 2018-01-21 RX ADMIN — ISOSORBIDE MONONITRATE SCH MG: 60 TABLET, EXTENDED RELEASE ORAL at 12:34

## 2018-01-21 RX ADMIN — HYDRALAZINE HYDROCHLORIDE SCH MG: 50 TABLET, FILM COATED ORAL at 05:25

## 2018-01-21 RX ADMIN — ASPIRIN SCH MG: 81 TABLET, COATED ORAL at 10:16

## 2018-01-21 RX ADMIN — CARVEDILOL SCH MG: 12.5 TABLET, FILM COATED ORAL at 12:34

## 2018-01-21 RX ADMIN — FAMOTIDINE SCH MG: 20 TABLET, FILM COATED ORAL at 22:49

## 2018-01-21 RX ADMIN — CARVEDILOL SCH MG: 12.5 TABLET, FILM COATED ORAL at 22:49

## 2018-01-21 RX ADMIN — Medication SCH ML: at 14:05

## 2018-01-21 RX ADMIN — RANOLAZINE SCH MG: 500 TABLET, FILM COATED, EXTENDED RELEASE ORAL at 10:16

## 2018-01-21 RX ADMIN — Medication SCH ML: at 05:23

## 2018-01-21 RX ADMIN — RANOLAZINE SCH MG: 500 TABLET, FILM COATED, EXTENDED RELEASE ORAL at 22:50

## 2018-01-21 RX ADMIN — ATORVASTATIN CALCIUM SCH MG: 40 TABLET, FILM COATED ORAL at 22:49

## 2018-01-21 RX ADMIN — FAMOTIDINE SCH MG: 20 TABLET, FILM COATED ORAL at 10:16

## 2018-01-21 RX ADMIN — AMLODIPINE BESYLATE SCH MG: 5 TABLET ORAL at 12:35

## 2018-01-21 RX ADMIN — ENOXAPARIN SODIUM SCH MG: 40 INJECTION SUBCUTANEOUS at 10:16

## 2018-01-21 RX ADMIN — AMLODIPINE BESYLATE SCH MG: 5 TABLET ORAL at 22:49

## 2018-01-21 RX ADMIN — Medication SCH ML: at 22:55

## 2018-01-22 LAB
ANION GAP SERPL CALC-SCNC: 10 MMOL/L (ref 5–19)
BUN SERPL-MCNC: 28 MG/DL (ref 7–20)
CALCIUM: 10 MG/DL (ref 8.4–10.2)
CHLORIDE SERPL-SCNC: 105 MMOL/L (ref 98–107)
CO2 SERPL-SCNC: 24 MMOL/L (ref 22–30)
GLUCOSE SERPL-MCNC: 133 MG/DL (ref 75–110)
POTASSIUM SERPL-SCNC: 4.5 MMOL/L (ref 3.6–5)
SODIUM SERPL-SCNC: 138.5 MMOL/L (ref 137–145)

## 2018-01-22 RX ADMIN — CARVEDILOL SCH MG: 12.5 TABLET, FILM COATED ORAL at 09:35

## 2018-01-22 RX ADMIN — CARVEDILOL SCH MG: 12.5 TABLET, FILM COATED ORAL at 21:07

## 2018-01-22 RX ADMIN — ATORVASTATIN CALCIUM SCH MG: 40 TABLET, FILM COATED ORAL at 21:06

## 2018-01-22 RX ADMIN — Medication SCH ML: at 21:10

## 2018-01-22 RX ADMIN — ISOSORBIDE MONONITRATE SCH MG: 60 TABLET, EXTENDED RELEASE ORAL at 08:26

## 2018-01-22 RX ADMIN — ENOXAPARIN SODIUM SCH MG: 40 INJECTION SUBCUTANEOUS at 09:35

## 2018-01-22 RX ADMIN — Medication SCH ML: at 05:52

## 2018-01-22 RX ADMIN — RANOLAZINE SCH MG: 500 TABLET, FILM COATED, EXTENDED RELEASE ORAL at 21:06

## 2018-01-22 RX ADMIN — AMLODIPINE BESYLATE SCH MG: 5 TABLET ORAL at 21:07

## 2018-01-22 RX ADMIN — RANOLAZINE SCH MG: 500 TABLET, FILM COATED, EXTENDED RELEASE ORAL at 09:35

## 2018-01-22 RX ADMIN — FAMOTIDINE SCH MG: 20 TABLET, FILM COATED ORAL at 21:07

## 2018-01-22 RX ADMIN — LANSOPRAZOLE SCH MG: 30 TABLET, ORALLY DISINTEGRATING, DELAYED RELEASE ORAL at 05:51

## 2018-01-22 RX ADMIN — ASPIRIN SCH MG: 81 TABLET, COATED ORAL at 09:34

## 2018-01-22 RX ADMIN — AMLODIPINE BESYLATE SCH MG: 5 TABLET ORAL at 09:34

## 2018-01-22 RX ADMIN — ESCITALOPRAM OXALATE SCH MG: 10 TABLET, FILM COATED ORAL at 09:34

## 2018-01-22 RX ADMIN — Medication SCH ML: at 13:21

## 2018-01-22 RX ADMIN — FAMOTIDINE SCH MG: 20 TABLET, FILM COATED ORAL at 09:34

## 2018-01-22 NOTE — PDOC PROGRESS REPORT
Subjective


Progress Note for:: 01/22/18


Subjective:: 


OOB in chair. Denied any chest pain or shortness of breath. Denied any 

significant dizziness so far today but less ambulatory. No abdominal pain, 

nausea, or vomiting or abdominal pain. Tolerating oral feeding. 


Reason For Visit: 


ATAXIA;OLD LACUNA INFARCT;HYPERTENSION








Physical Exam


Vital Signs: 


 











Temp Pulse Resp BP Pulse Ox


 


 98.5 F   56 L  19   136/58 H  98 


 


 01/22/18 08:02  01/22/18 08:02  01/22/18 08:02  01/22/18 08:02  01/22/18 08:02








 Intake & Output











 01/21/18 01/22/18 01/23/18





 06:59 06:59 06:59


 


Intake Total 1599 1213 


 


Output Total 550 1 


 


Balance 1049 1212 


 


Weight 59.8 kg  











Physical Exam: 


General appearance: PRESENT: no acute distress, well-developed, well-nourished


Head exam: PRESENT: atraumatic, normocephalic


Mouth exam: PRESENT: moist


Respiratory exam: PRESENT: clear to auscultation ximena


Cardiovascular exam: PRESENT: RRR.  ABSENT: diastolic murmur, rubs, systolic 

murmur


Vascular exam: PRESENT: normal capillary refill.  ABSENT: pallor


GI/Abdominal exam: PRESENT: normal bowel sounds, soft.  ABSENT: distended, 

guarding, mass, organomegaly, rebound, tenderness


Extremities exam: ABSENT: pedal edema


Musculoskeletal exam: PRESENT: normal inspection


Neurological exam: PRESENT: alert, awake, oriented to person, oriented to place

, oriented to time, oriented to situation, CN II-XII grossly intact.  ABSENT: 

motor sensory deficit


Psychiatric exam: PRESENT: appropriate affect, normal mood.  ABSENT: homicidal 

ideation, suicidal ideation


Skin exam: PRESENT: dry, intact, warm.  ABSENT: cyanosis, rash





Results


Laboratory Results: 


 





 01/20/18 07:20 





 01/21/18 06:09 





 











  01/21/18





  06:09


 


Magnesium  1.7








 











  01/19/18 01/19/18 01/20/18





  20:49 20:49 02:30


 


Creatine Kinase  44   47


 


CK-MB (CK-2)   0.35 


 


Troponin I   < 0.012 














  01/20/18 01/20/18 01/20/18





  02:30 09:32 09:32


 


Creatine Kinase    45


 


CK-MB (CK-2)  0.30  < 0.22 


 


Troponin I  0.021  0.013 











Impressions: 


 





Chest X-Ray  01/19/18 10:54


IMPRESSION:  No acute findings.


Cardiomegaly with pacemaker and other cardiac support hardware


 








Head CT  01/19/18 14:05


IMPRESSION:  No CT evidence of acute findings.


Right superior cerebellar hemisphere lacunar infarct, chronic


EVIDENCE OF ACUTE STROKE: NO.


 














Assessment & Plan





- Diagnosis


(1) Ataxia


Is this a current diagnosis for this admission?: Yes   





(2) Old lacunar stroke without late effect


Is this a current diagnosis for this admission?: Yes   





(3) HTN (hypertension)


Qualifiers: 


   Hypertension type: essential hypertension   Qualified Code(s): I10 - 

Essential (primary) hypertension   


Is this a current diagnosis for this admission?: Yes   





(4) Coronary artery disease


Qualifiers: 


   Coronary Disease-Associated Artery/Lesion type: unspecified vessel or lesion 

type   Associated angina: without angina 


Is this a current diagnosis for this admission?: Yes   





(5) Old MI (myocardial infarction)


Is this a current diagnosis for this admission?: Yes   





(6) AICD (automatic cardioverter/defibrillator) present


Is this a current diagnosis for this admission?: Yes   





(7) HLD (hyperlipidemia)


Qualifiers: 


   Hyperlipidemia type: unspecified   Qualified Code(s): E78.5 - Hyperlipidemia

, unspecified   


Is this a current diagnosis for this admission?: Yes   





(8) COPD (chronic obstructive pulmonary disease)


Qualifiers: 


   COPD type: chronic bronchitis   Qualified Code(s): J44.9 - Chronic 

obstructive pulmonary disease, unspecified   


Is this a current diagnosis for this admission?: Yes   





(9) GERD (gastroesophageal reflux disease)


Qualifiers: 


   Esophagitis presence: without esophagitis   Qualified Code(s): K21.9 - Gastro

-esophageal reflux disease without esophagitis   


Is this a current diagnosis for this admission?: Yes   





- Time


Time Spent with patient: 25-34 minutes


Medications reviewed and adjusted accordingly: Yes


Anticipated discharge: Home with Homehealth


Within: Other





- Inpatient Certification


Based on my medical assessment, after consideration of the patient's 

comorbidities, presenting symptoms, or acuity I expect that the services needed 

warrant INPATIENT care.: Yes


I certify that my determination is in accordance with my understanding of 

Medicare's requirements for reasonable and necessary INPATIENT services [42 CFR 

412.3e].: Yes


Medical Necessity: Need Close Monitoring Due to Risk of Patient Decompensation, 

Need For Continuous Telemetry Monitoring, Risk of Complication if Not Cared For 

in Hospital


Post Hospital Care: D/C Planner Documentation





- Plan Summary


Plan Summary: 





Continue to hold Hydralazine and HCTZ from her anti HTN medication. Obtain BMP. 

If her renal indices improve and blood pressure remain satisfactory, she is 

agreeable to discharge tomorrow.

## 2018-01-23 RX ADMIN — CARVEDILOL SCH MG: 12.5 TABLET, FILM COATED ORAL at 22:06

## 2018-01-23 RX ADMIN — Medication SCH ML: at 22:10

## 2018-01-23 RX ADMIN — AMLODIPINE BESYLATE SCH MG: 5 TABLET ORAL at 22:06

## 2018-01-23 RX ADMIN — Medication SCH ML: at 13:28

## 2018-01-23 RX ADMIN — ISOSORBIDE MONONITRATE SCH MG: 60 TABLET, EXTENDED RELEASE ORAL at 08:32

## 2018-01-23 RX ADMIN — ATORVASTATIN CALCIUM SCH MG: 40 TABLET, FILM COATED ORAL at 22:07

## 2018-01-23 RX ADMIN — RANOLAZINE SCH MG: 500 TABLET, FILM COATED, EXTENDED RELEASE ORAL at 10:23

## 2018-01-23 RX ADMIN — LANSOPRAZOLE SCH MG: 30 TABLET, ORALLY DISINTEGRATING, DELAYED RELEASE ORAL at 05:06

## 2018-01-23 RX ADMIN — AMLODIPINE BESYLATE SCH MG: 5 TABLET ORAL at 10:25

## 2018-01-23 RX ADMIN — ESCITALOPRAM OXALATE SCH MG: 10 TABLET, FILM COATED ORAL at 10:25

## 2018-01-23 RX ADMIN — FAMOTIDINE SCH MG: 20 TABLET, FILM COATED ORAL at 22:07

## 2018-01-23 RX ADMIN — ASPIRIN SCH MG: 81 TABLET, COATED ORAL at 10:25

## 2018-01-23 RX ADMIN — ENOXAPARIN SODIUM SCH MG: 40 INJECTION SUBCUTANEOUS at 10:23

## 2018-01-23 RX ADMIN — Medication SCH ML: at 05:06

## 2018-01-23 RX ADMIN — CARVEDILOL SCH MG: 12.5 TABLET, FILM COATED ORAL at 10:25

## 2018-01-23 RX ADMIN — FAMOTIDINE SCH MG: 20 TABLET, FILM COATED ORAL at 10:25

## 2018-01-23 RX ADMIN — RANOLAZINE SCH MG: 500 TABLET, FILM COATED, EXTENDED RELEASE ORAL at 22:07

## 2018-01-23 NOTE — PDOC PROGRESS REPORT
Subjective


Progress Note for:: 01/23/18


Subjective:: 


No chest pain or shortness of breath. No abdominal pain, nausea, or vomiting or 

abdominal pain. Tolerating oral feeding. Ambulatory in her room today without 

any significant dizziness or vertigo. Her blood pressure remain in acceptable 

range.


Reason For Visit: 


ATAXIA;OLD LACUNA INFARCT;HYPERTENSION








Physical Exam


Vital Signs: 


 











Temp Pulse Resp BP Pulse Ox


 


 97.8 F   66   20   150/70 H  95 


 


 01/23/18 19:11  01/23/18 19:11  01/23/18 19:11  01/23/18 19:11  01/23/18 19:11








 Intake & Output











 01/22/18 01/23/18 01/24/18





 06:59 06:59 06:59


 


Intake Total 1213 1281 877


 


Output Total 1  


 


Balance 1212 1281 877











Physical Exam: 


General appearance: PRESENT: no acute distress, well-developed, well-nourished


Head exam: PRESENT: atraumatic, normocephalic


Mouth exam: PRESENT: moist


Respiratory exam: PRESENT: clear to auscultation ximena


Cardiovascular exam: PRESENT: RRR.  ABSENT: diastolic murmur, rubs, systolic 

murmur


Vascular exam: PRESENT: normal capillary refill.  ABSENT: pallor


GI/Abdominal exam: PRESENT: normal bowel sounds, soft.  ABSENT: distended, 

guarding, mass, organomegaly, rebound, tenderness


Extremities exam: ABSENT: pedal edema


Musculoskeletal exam: PRESENT: normal inspection


Neurological exam: PRESENT: alert, awake, oriented to person, oriented to place

, oriented to time, oriented to situation, CN II-XII grossly intact.  ABSENT: 

motor sensory deficit


Psychiatric exam: PRESENT: appropriate affect, normal mood.  ABSENT: homicidal 

ideation, suicidal ideation


Skin exam: PRESENT: dry, intact, warm.  ABSENT: cyanosis, rash








Results


Laboratory Results: 


 





 01/20/18 07:20 





 01/22/18 11:23 





 











  01/19/18 01/19/18 01/20/18





  20:49 20:49 02:30


 


Creatine Kinase  44   47


 


CK-MB (CK-2)   0.35 


 


Troponin I   < 0.012 














  01/20/18 01/20/18 01/20/18





  02:30 09:32 09:32


 


Creatine Kinase    45


 


CK-MB (CK-2)  0.30  < 0.22 


 


Troponin I  0.021  0.013 











Impressions: 


 





Chest X-Ray  01/19/18 10:54


IMPRESSION:  No acute findings.


Cardiomegaly with pacemaker and other cardiac support hardware


 








Head CT  01/19/18 14:05


IMPRESSION:  No CT evidence of acute findings.


Right superior cerebellar hemisphere lacunar infarct, chronic


EVIDENCE OF ACUTE STROKE: NO.


 














Assessment & Plan





- Diagnosis


(1) Ataxia


Is this a current diagnosis for this admission?: Yes   





(2) Old lacunar stroke without late effect


Is this a current diagnosis for this admission?: Yes   





(3) HTN (hypertension)


Qualifiers: 


   Hypertension type: essential hypertension   Qualified Code(s): I10 - 

Essential (primary) hypertension   


Is this a current diagnosis for this admission?: Yes   





(4) Coronary artery disease


Qualifiers: 


   Coronary Disease-Associated Artery/Lesion type: unspecified vessel or lesion 

type   Associated angina: without angina 


Is this a current diagnosis for this admission?: Yes   





(5) Old MI (myocardial infarction)


Is this a current diagnosis for this admission?: Yes   





(6) AICD (automatic cardioverter/defibrillator) present


Is this a current diagnosis for this admission?: Yes   





(7) HLD (hyperlipidemia)


Qualifiers: 


   Hyperlipidemia type: unspecified   Qualified Code(s): E78.5 - Hyperlipidemia

, unspecified   


Is this a current diagnosis for this admission?: Yes   





(8) COPD (chronic obstructive pulmonary disease)


Qualifiers: 


   COPD type: chronic bronchitis   Qualified Code(s): J44.9 - Chronic 

obstructive pulmonary disease, unspecified   


Is this a current diagnosis for this admission?: Yes   





(9) GERD (gastroesophageal reflux disease)


Qualifiers: 


   Esophagitis presence: without esophagitis   Qualified Code(s): K21.9 - Gastro

-esophageal reflux disease without esophagitis   


Is this a current diagnosis for this admission?: Yes   





- Time


Time Spent with patient: 25-34 minutes


Medications reviewed and adjusted accordingly: Yes


Anticipated discharge: Home


Within: within 24 hours





- Inpatient Certification


Based on my medical assessment, after consideration of the patient's 

comorbidities, presenting symptoms, or acuity I expect that the services needed 

warrant INPATIENT care.: Yes


I certify that my determination is in accordance with my understanding of 

Medicare's requirements for reasonable and necessary INPATIENT services [42 CFR 

412.3e].: Yes


Medical Necessity: Need Close Monitoring Due to Risk of Patient Decompensation, 

Need For Continuous Telemetry Monitoring, Risk of Complication if Not Cared For 

in Hospital


Post Hospital Care: D/C Planner Documentation





- Plan Summary


Plan Summary: 


Continue current medication management. Patient is agreeable to discharge home 

tomorrow. Her renal indices are imprving.

## 2018-01-24 VITALS — DIASTOLIC BLOOD PRESSURE: 67 MMHG | SYSTOLIC BLOOD PRESSURE: 141 MMHG

## 2018-01-24 RX ADMIN — Medication SCH ML: at 05:35

## 2018-01-24 RX ADMIN — RANOLAZINE SCH MG: 500 TABLET, FILM COATED, EXTENDED RELEASE ORAL at 08:50

## 2018-01-24 RX ADMIN — ISOSORBIDE MONONITRATE SCH MG: 60 TABLET, EXTENDED RELEASE ORAL at 08:50

## 2018-01-24 RX ADMIN — ENOXAPARIN SODIUM SCH MG: 40 INJECTION SUBCUTANEOUS at 08:53

## 2018-01-24 RX ADMIN — AMLODIPINE BESYLATE SCH MG: 5 TABLET ORAL at 08:50

## 2018-01-24 RX ADMIN — CARVEDILOL SCH MG: 12.5 TABLET, FILM COATED ORAL at 08:52

## 2018-01-24 RX ADMIN — ASPIRIN SCH MG: 81 TABLET, COATED ORAL at 08:51

## 2018-01-24 RX ADMIN — LANSOPRAZOLE SCH MG: 30 TABLET, ORALLY DISINTEGRATING, DELAYED RELEASE ORAL at 05:34

## 2018-01-24 RX ADMIN — ESCITALOPRAM OXALATE SCH MG: 10 TABLET, FILM COATED ORAL at 08:49

## 2018-01-24 RX ADMIN — FAMOTIDINE SCH MG: 20 TABLET, FILM COATED ORAL at 08:52

## 2018-01-24 NOTE — PDOC DISCHARGE SUMMARY
General





- Admit/Disc Date/PCP


Admission Date/Primary Care Provider: 


  01/19/18 17:42





  CLAYTON CABALLERO MD





Discharge Date: 01/24/18





- Discharge Diagnosis


(1) Ataxia


Is this a current diagnosis for this admission?: Yes   





(2) Old lacunar stroke without late effect


Is this a current diagnosis for this admission?: Yes   





(3) HTN (hypertension)


Is this a current diagnosis for this admission?: Yes   





(4) Coronary artery disease


Is this a current diagnosis for this admission?: Yes   





(5) Old MI (myocardial infarction)


Is this a current diagnosis for this admission?: Yes   





(6) AICD (automatic cardioverter/defibrillator) present


Is this a current diagnosis for this admission?: Yes   





(7) HLD (hyperlipidemia)


Is this a current diagnosis for this admission?: Yes   





(8) COPD (chronic obstructive pulmonary disease)


Is this a current diagnosis for this admission?: Yes   





(9) GERD (gastroesophageal reflux disease)


Is this a current diagnosis for this admission?: Yes   





- Additional Information


Discharge Activity: Activity As Tolerated


Home Medications: 








Amlodipine Besylate [Norvasc 5 mg Tablet] 5 mg PO Q12 01/19/18 


Aspirin [Aspirin EC] 81 mg PO DAILY 01/19/18 


Atorvastatin Calcium [Lipitor 40 mg Tablet] 40 mg PO QHS 01/19/18 


Carvedilol [Coreg 25 mg Tablet] 25 mg PO Q12 01/19/18 


Escitalopram Oxalate [Lexapro 10 mg Tablet] 10 mg PO DAILY 01/19/18 


Isosorbide Mononitrate [Imdur 60 mg Tablet.er] 60 mg PO QAM 01/19/18 


Omeprazole 40 mg PO DAILY 01/19/18 


Ranitidine HCl [Zantac 150 mg Tablet] 75 mg PO Q12 01/19/18 


Ranolazine [Ranexa] 1,000 mg PO Q12 01/19/18 











History of Present Illness


History of Present Illness: 


JAYCEE FLOWERS is a 72 year old female patient of Dr Clayton Caballero who 

presented to the ED with complain of generalized weakness and feeling like she 

was going to fall backward. Patient reported to me that since 12/27/2017 she 

has been more involved in caring for her  due to his functional 

limitation. She claimed that she most have overworked herself and caused her 

weakness. She claimed that she was Dr. Caballero about 2 months ago due to issue of 

loss of balance and she was prescribed a cane for ambulatory assistance. She 

reported associated dizziness, intermittent vertigo and recurrent falls without 

any trauma since 12/27/2017. She reported sternal region chest pressure like 

discomfort without radiation, associated palpitation, radiation, or 

diaphoresis. She denied any loss of consciousness, headache, nausea, vomiting, 

abdominal pain, or diarrhea. She admitted to history of hypertension but denied 

any history of stroke, seizure disorder, or focal weakness. She claimed 

compliance with her medication and medical care follow up with Dr. Caballero. Upon 

arrival in the ED her initial evaluation was significant for slightly elevated 

blood pressure and physical examination suggestive of left sided drift, 

unsteadiness in balance and difficulty with ambulation. Her initial radiology 

evaluation with head CT scan was unrevealing of any significant acute 

pathologic process. She was not able to get brain MRI evaluation due to AICD 

implant device. Her morbidities include HTN, CAD s/p CABG, chronic CHF, old MI, 

Hyperlipidemia, COPD, GERD, and Depression. She was advised hospitalization for 

further evaluation and management.





Hospital Course


Hospital Course: 


Patient did respond to treatment and eventual resolution of her presenting 

symptoms with adjustment in her medications. Her Gabapentin and Trazodone were 

discontinued due to possible contribution to her presenting symptom of Ataxia. 

Her Hydralazine and Hydrochlorothiazide were discontinued. Her blood pressure 

remain stable and symptoms of dizziness and vertigo resoled. She was able to 

ambulate without any problem. She will be discharged home today. She will 

follow up with Dr. Caballero as instructed upon discharge.





Physical Exam


Vital Signs: 


 











Temp Pulse Resp BP Pulse Ox


 


 99.4 F   61   16   128/62 H  94 


 


 01/24/18 03:08  01/24/18 03:08  01/24/18 03:08  01/24/18 03:08  01/24/18 03:08








 Intake & Output











 01/22/18 01/23/18 01/24/18





 06:59 06:59 06:59


 


Intake Total 1213 1281 1697


 


Output Total 1  


 


Balance 1212 1281 1697











Physical Exam: 


General appearance: PRESENT: no acute distress, well-developed, well-nourished


Head exam: PRESENT: atraumatic, normocephalic


Mouth exam: PRESENT: moist


Respiratory exam: PRESENT: clear to auscultation ximena


Cardiovascular exam: PRESENT: RRR.  ABSENT: diastolic murmur, rubs, systolic 

murmur


Vascular exam: PRESENT: normal capillary refill.  ABSENT: pallor


GI/Abdominal exam: PRESENT: normal bowel sounds, soft.  ABSENT: distended, 

guarding, mass, organomegaly, rebound, tenderness


Extremities exam: ABSENT: pedal edema


Musculoskeletal exam: PRESENT: normal inspection


Neurological exam: PRESENT: alert, awake, oriented to person, oriented to place

, oriented to time, oriented to situation, CN II-XII grossly intact.  ABSENT: 

motor sensory deficit


Psychiatric exam: PRESENT: appropriate affect, normal mood.  ABSENT: homicidal 

ideation, suicidal ideation


Skin exam: PRESENT: dry, intact, warm.  ABSENT: cyanosis, rash





Results


Laboratory Results: 


 





 01/20/18 07:20 





 01/22/18 11:23 





 











  01/19/18 01/19/18 01/20/18





  20:49 20:49 02:30


 


Creatine Kinase  44   47


 


CK-MB (CK-2)   0.35 


 


Troponin I   < 0.012 














  01/20/18 01/20/18 01/20/18





  02:30 09:32 09:32


 


Creatine Kinase    45


 


CK-MB (CK-2)  0.30  < 0.22 


 


Troponin I  0.021  0.013 











Impressions: 


 





Chest X-Ray  01/19/18 10:54


IMPRESSION:  No acute findings.


Cardiomegaly with pacemaker and other cardiac support hardware


 








Head CT  01/19/18 14:05


IMPRESSION:  No CT evidence of acute findings.


Right superior cerebellar hemisphere lacunar infarct, chronic


EVIDENCE OF ACUTE STROKE: NO.


 














Qualifiers


**PATEINT BEING DISCHARGED WITH ANY OF THE FOLLOWING DIAGNOSIS?: No





Plan


Discharge Plan: 





Discharge home today. Follow up with Dr Caballero as instructed upon discharge.

## 2018-01-25 ENCOUNTER — HOSPITAL ENCOUNTER (EMERGENCY)
Dept: HOSPITAL 62 - ER | Age: 73
LOS: 1 days | Discharge: HOME | End: 2018-01-26
Payer: MEDICARE

## 2018-01-25 DIAGNOSIS — I25.10: ICD-10-CM

## 2018-01-25 DIAGNOSIS — R07.9: Primary | ICD-10-CM

## 2018-01-25 DIAGNOSIS — R42: ICD-10-CM

## 2018-01-25 PROCEDURE — 80053 COMPREHEN METABOLIC PANEL: CPT

## 2018-01-25 PROCEDURE — 85025 COMPLETE CBC W/AUTO DIFF WBC: CPT

## 2018-01-25 PROCEDURE — 99285 EMERGENCY DEPT VISIT HI MDM: CPT

## 2018-01-25 PROCEDURE — 96360 HYDRATION IV INFUSION INIT: CPT

## 2018-01-25 PROCEDURE — 93005 ELECTROCARDIOGRAM TRACING: CPT

## 2018-01-25 PROCEDURE — 36415 COLL VENOUS BLD VENIPUNCTURE: CPT

## 2018-01-25 PROCEDURE — 82553 CREATINE MB FRACTION: CPT

## 2018-01-25 PROCEDURE — 84484 ASSAY OF TROPONIN QUANT: CPT

## 2018-01-25 PROCEDURE — 82550 ASSAY OF CK (CPK): CPT

## 2018-01-25 PROCEDURE — 93010 ELECTROCARDIOGRAM REPORT: CPT

## 2018-01-25 PROCEDURE — 71045 X-RAY EXAM CHEST 1 VIEW: CPT

## 2018-01-26 VITALS — DIASTOLIC BLOOD PRESSURE: 68 MMHG | SYSTOLIC BLOOD PRESSURE: 137 MMHG

## 2018-01-26 LAB
ABSOLUTE LYMPHOCYTES# (MANUAL): 3.4 10^3/UL (ref 0.5–4.7)
ABSOLUTE MONOCYTES # (MANUAL): 0.2 10^3/UL (ref 0.1–1.4)
ABSOLUTE NEUTROPHILS# (MANUAL): 5.4 10^3/UL (ref 1.7–8.2)
ADD MANUAL DIFF: YES
ALBUMIN SERPL-MCNC: 4.5 G/DL (ref 3.5–5)
ALP SERPL-CCNC: 92 U/L (ref 38–126)
ALT SERPL-CCNC: 34 U/L (ref 9–52)
ANION GAP SERPL CALC-SCNC: 11 MMOL/L (ref 5–19)
ANISOCYTOSIS BLD QL SMEAR: SLIGHT
AST SERPL-CCNC: 24 U/L (ref 14–36)
BASOPHILS NFR BLD MANUAL: 0 % (ref 0–2)
BILIRUB DIRECT SERPL-MCNC: 0.2 MG/DL (ref 0–0.4)
BILIRUB SERPL-MCNC: 0.5 MG/DL (ref 0.2–1.3)
BUN SERPL-MCNC: 34 MG/DL (ref 7–20)
BURR CELLS BLD QL SMEAR: (no result)
CALCIUM: 10.2 MG/DL (ref 8.4–10.2)
CHLORIDE SERPL-SCNC: 107 MMOL/L (ref 98–107)
CK MB SERPL-MCNC: 0.37 NG/ML (ref ?–4.55)
CK SERPL-CCNC: 81 U/L (ref 30–135)
CO2 SERPL-SCNC: 25 MMOL/L (ref 22–30)
DACRYOCYTES BLD QL SMEAR: (no result)
EOSINOPHIL NFR BLD MANUAL: 0 % (ref 0–6)
ERYTHROCYTE [DISTWIDTH] IN BLOOD BY AUTOMATED COUNT: 15.8 % (ref 11.5–14)
GLUCOSE SERPL-MCNC: 129 MG/DL (ref 75–110)
HCT VFR BLD CALC: 35.9 % (ref 36–47)
HGB BLD-MCNC: 12 G/DL (ref 12–15.5)
MCH RBC QN AUTO: 30.5 PG (ref 27–33.4)
MCHC RBC AUTO-ENTMCNC: 33.5 G/DL (ref 32–36)
MCV RBC AUTO: 91 FL (ref 80–97)
MONOCYTES % (MANUAL): 2 % (ref 3–13)
OVALOCYTES BLD QL SMEAR: (no result)
PLATELET # BLD: 201 10^3/UL (ref 150–450)
PLATELET COMMENT: ADEQUATE
PLATELET LARGE: PRESENT
POIKILOCYTOSIS BLD QL SMEAR: (no result)
POTASSIUM SERPL-SCNC: 4.5 MMOL/L (ref 3.6–5)
PROT SERPL-MCNC: 6.9 G/DL (ref 6.3–8.2)
RBC # BLD AUTO: 3.95 10^6/UL (ref 3.72–5.28)
SCHISTOCYTES BLD QL SMEAR: (no result)
SEGMENTED NEUTROPHILS % (MAN): 60 % (ref 42–78)
SODIUM SERPL-SCNC: 143.4 MMOL/L (ref 137–145)
TOTAL CELLS COUNTED BLD: 100
TOXIC GRANULES BLD QL SMEAR: (no result)
TROPONIN I SERPL-MCNC: 0.01 NG/ML
VARIANT LYMPHS NFR BLD MANUAL: 38 % (ref 13–45)
WBC # BLD AUTO: 9 10^3/UL (ref 4–10.5)

## 2018-01-26 NOTE — ER DOCUMENT REPORT
ED General





- General


Chief Complaint: Dizziness


Stated Complaint: CHEST PAINS


Time Seen by Provider: 18 02:41


Notes: 





Patient is a 72-year-old female presents with complaint of dizziness that occur 

with some chest tightness.  She says this happened several times in the past.  

She was actually seen admitted and had a strokelike workup for this.  Workup 

was negative.  She also had serial cardiac enzymes which remained stable.  She 

does have history of cardiac disease.  She does have a defibrillator.  She 

denies being shocked by defibrillator in the last couple months.  She denies 

any fevers.  No infections.  She says she currently is chest pain-free and 

feels well and has no further complaints at this time.  She denies any syncopal 

episodes.


TRAVEL OUTSIDE OF THE [place] IN LAST 30 DAYS: No





- Related Data


Allergies/Adverse Reactions: 


 





amoxicillin [Amoxicillin] Allergy (Intermediate, Verified 17 16:46)


 Hallucinations


Penicillins Allergy (Intermediate, Verified 17 16:46)


 Hallucinations











Past Medical History





- Social History


Smoking Status: Unknown if Ever Smoked


Chew tobacco use (# tins/day): No


Frequency of alcohol use: None


Drug Abuse: None


Family History: Reviewed & Not Pertinent, Hypertension


Patient has suicidal ideation: No


Patient has homicidal ideation: No





- Past Medical History


Cardiac Medical History: Reports: Hx Congestive Heart Failure, Hx Coronary 

Artery Disease, Hx Heart Attack - , Hx Hypercholesterolemia, Hx Hypertension


   Denies: Hx Atrial Fibrillation, Hx Peripheral Vascular Disease, Hx Pulmonary 

Embolism, Hx Heart Murmur


Pulmonary Medical History: Reports: Hx Bronchitis, Hx COPD, Hx Pneumonia


   Denies: Hx Asthma, Hx Respiratory Failure, Hx Sleep Apnea, Hx Tuberculosis


Neurological Medical History: Denies: Hx Cerebrovascular Accident, Hx Seizures


Endocrine Medical History: Denies: Hx Diabetes Mellitus Type 1, Hx Diabetes 

Mellitus Type 2


Renal/ Medical History: Reports: Hx Renal Insufficiency.  Denies: Hx End 

Stage Renal Disease, Hx Kidney Stones, Hx Peritoneal Dialysis


Malignancy Medical History: Denies: Hx Leukemia, Hx Lung Cancer


GI Medical History: Reports: Hx Crohn's Disease - Patient has either Crohn's 

disease or ulcerative colitis, Hx Diverticulitis, Hx Gastroesophageal Reflux 

Disease.  Denies: Hx Hepatitis, Hx Hiatal Hernia, Hx Irritable Bowel, Hx Liver 

Failure, Hx Pancreatitis, Hx Ulcer


Musculoskeltal Medical History: Reports Hx Arthritis, Denies Hx Fibromyalgia, 

Denies Hx Muscular Dystrophy


Psychiatric Medical History: Reports: Hx Depression


   Denies: Hx Bipolar Disorder, Hx Post Traumatic Stress Disorder, Hx 

Schizophrenia


Traumatic Medical History: Reports: Hx Fractures - right wrist, left foot


Infectious Medical History: Denies: Hx Hepatitis, Hx HIV


Past Surgical History: Reports: Hx Appendectomy, Hx Cardiac Catheterization, Hx 

Cardiac Surgery - AICD for ventricular fibrillation, Hx  Section - x4, 

Hx Coronary Stent, Hx Hysterectomy, Hx Orthopedic Surgery - bilateral knee 

surgery, Hx Pacemaker, Hx Tonsillectomy.  Denies: Hx Bowel Surgery, Hx 

Cholecystectomy, Hx Colostomy, Hx Coronary Artery Bypass Graft, Hx Gastric 

Bypass Surgery, Hx Herniorrhaphy, Hx Mastectomy, Hx Open Heart Surgery, Hx 

Tubal Ligation





- Immunizations


Immunizations up to date: Yes


Hx Diphtheria, Pertussis, Tetanus Vaccination: Yes


Hx Pneumococcal Vaccination: 01/01/10





Review of Systems





- Review of Systems


Notes: 





My Normal Review Basic





REVIEW OF SYSTEMS:


CONSTITUTIONAL :  Denies fever,  chills, or sweats.  Denies recent illness.


EENT:   Denies eye, ear, throat, or mouth pain or symptoms.  Denies nasal or 

sinus congestion.


CARDIOVASCULAR: Episode of chest tightness.


RESPIRATORY:  Denies cough, cold, or chest congestion.  Denies shortness of 

breath, difficulty breathing, or wheezing.


GASTROINTESTINAL:  Denies abdominal pain.  Denies nausea, vomiting, or 

diarrhea.  


GENITOURINARY:  Denies difficulty urinating, painful urination, burning, 

frequency, or blood in urine.


MUSCULOSKELETAL:  Denies neck or back pain or joint pain or swelling.


SKIN:   Denies rash or skin lesions.


NEUROLOGICAL:  Denies altered mental status or loss of consciousness.  Denies 

headache.  Denies weakness or paralysis or loss of use of either side.  Denies 

sensory or motor loss.


ALL OTHER SYSTEMS REVIEWED AND NEGATIVE.





Physical Exam





- Notes


Notes: 





General Appearance: Well nourished, alert, cooperative, no acute distress, no 

obvious discomfort.  Well-appearing.


Vitals: reviewed, See vital signs table.


Head: no swelling or tenderness to the head


Eyes: PERRL, EOMI, Conjuctiva clear


Mouth: No decreasd moisture


Lungs: No wheezing, No rales, No rhonci, No accessory muscle use, good air 

exchange bilaterally.


Heart: Normal rate, Regular rythm, No murmur, no rub


Abdomen: Normal BS, soft, No rigidity, No abdominal tenderness, No guarding, no 

rebound, no abdominal masses, no organomegaly


Extremities: strength 5/5 in all extremities, good pulses in all extremities, 

no swelling or tenderness in the extremities, no edema.


Skin: warm, dry, appropriate color, no rash


Neuro: speech clear, oriented x 3, normal affect, responds appropriately to 

questions.  Cranial nerves II through XII are intact.  Distal sensation intact.

  Patient moves all extremities without difficulty.





Course





- Re-evaluation


Re-evalutation: 





18 05:24


We did review the patient's records.  She had a stress test back in .  When 

she was admitted last week she had serial cardiac enzymes which remained the 

same.  Her troponin today is 0.014 which is consistent with what her troponin 

chronically is.  I did call and speak with Dr. Caballero.  I informed him that the 

dizziness seems more chronic and that she has some chest tightness associated 

with it when she has it.  Informs me that this is chronic and says been ongoing 

for a long time.  He says that he prefers to see the patient has office today 

as opposed to admitting her.  Patient currently is chest pain-free and is 

actually requesting to go home.  Feels therefore appropriate to discharge her 

since she does have close follow-up today with Dr. Caballero.  I informed her 

return to ER if she has recurrent chest pain or feels unwell.  Patient agrees 

with plan and will be discharged home.  Her EKG when she first arrived had a 

heart rate of 51.  Her heart rate now is in the 60s.  Reviewing her vital signs 

from her recent admission her heart rate usually does run in the 50s and 60s.  

Blood pressure has been normal.





Dictation of this chart was performed using voice recognition software; 

therefore, there may be some unintended grammatical errors.


18 05:26








- Laboratory


Result Diagrams: 


 18 02:00





 18 02:00


Laboratory results interpreted by me: 


 











  18





  02:00 02:00


 


Hct  35.9 L 


 


RDW  15.8 H 


 


Monocytes % (Manual)  2 L 


 


BUN   34 H


 


Creatinine   1.40 H


 


Est GFR ( Amer)   44 L


 


Est GFR (Non-Af Amer)   37 L


 


Glucose   129 H














- EKG Interpretation by Me


Additional EKG results interpreted by me: 





18 05:23


EKG is reviewed and interpreted by me.  EKG shows sinus pericardial with rate 

51 bpm.  No ST segment elevation or depression.  Her EKG does show some T-wave 

inversions in her lateral precordial leads and lateral leads which are 

unchanged comparison to her old EKG from 2018.  MO interval 

slightly prolonged.  QRS duration QTc intervals are within normal range.





Discharge





- Discharge


Clinical Impression: 


 Dizziness





Chest pain


Qualifiers:


 Chest pain type: unspecified Qualified Code(s): R07.9 - Chest pain, unspecified





Condition: Good


Disposition: HOME, SELF-CARE


Additional Instructions: 


Please call Dr. Caballero's office this morning for a close follow up appointment. 

He expects to see you today in his office. Please return to the ER immediately 

if you have recurrent chest pain, difficulty breathing, worsening dizziness, or 

feel unwell.


Referrals: 


BISMARK CABALLERO MD [Primary Care Provider] - 18

## 2018-01-26 NOTE — RADIOLOGY REPORT (SQ)
EXAM DESCRIPTION: CHEST SINGLE VIEW



CLINICAL HISTORY: cp



COMPARISON: 1/19/2018



FINDINGS: Single frontal view of the chest.  Atherosclerotic

calcification and tortuosity of thoracic aorta. Cardiomegaly.

Left-sided pacemaker as well as other pacing leads identified and

unchanged. Coronary artery stent identified. No consolidation,

pneumothorax, or pleural effusion.  No displaced rib fractures

identified.  Upper abdominal soft tissues are unremarkable.



IMPRESSION:



1. No acute pulmonary process identified. Cardiomegaly.

## 2018-01-26 NOTE — EKG REPORT
SEVERITY:- ABNORMAL ECG -

SINUS RHYTHM

ABNORMAL T, CONSIDER ISCHEMIA, LATERAL LEADS

:

Confirmed by: Mica Hyatt MD 26-Jan-2018 07:37:34

## 2018-01-26 NOTE — EKG REPORT
SEVERITY:- ABNORMAL ECG -

SINUS BRADYCARDIA

ABNORMAL T, CONSIDER ISCHEMIA, LATERAL LEADS

:

Confirmed by: Mica Hyatt MD 26-Jan-2018 07:37:39

## 2018-03-05 ENCOUNTER — HOSPITAL ENCOUNTER (OUTPATIENT)
Dept: HOSPITAL 62 - WI | Age: 73
End: 2018-03-05
Attending: PHYSICIAN ASSISTANT
Payer: MEDICARE

## 2018-03-05 DIAGNOSIS — M81.0: Primary | ICD-10-CM

## 2018-03-05 PROCEDURE — 77080 DXA BONE DENSITY AXIAL: CPT

## 2018-03-05 NOTE — WOMENS IMAGING REPORT
EXAM DESCRIPTION:  BONE DENSITY HIP/SPINE



COMPLETED DATE/TIME:  3/5/2018 9:10 am



REASON FOR STUDY:  AGE-RELATED OSTEOPROSIS; M81.0 M81.0  AGE-RELATED OSTEOPOROSIS W/O CURRENT PATHOLO
GICAL FRAC



COMPARISON:   None.



TECHNIQUE:  Dual-Energy X-ray Absorptiometry (DEXA) of the AP left forearm and Hip.



LIMITATIONS:  None.



FINDINGS:  Left forearm:

The bone mineral density (BMD) the of the left forearm in the AP projection correlates with a T-score
 of -4.6, which is osteoporosis as defined by the World Health Organization.

HIP:

The bone mineral density (BMD) measured in the left hip correlates with a T-score of -4.1, which is o
steoporosis as defined by the World Health Organization.



IMPRESSION:  1.  Forearm: Osteoporosis

2.  HIP: Osteoporosis



COMMENT:  The World Health Organization defines low BMD as follows:

T-score:

Normal:  Greater than -1.0

Osteopenia: Between -1.0 and -2.5

Osteoporosis:  Less than -2.5 without fractures

Established osteoporosis:  Less than -2.5 with fractures

In general, you may wish to consider:

Diagnosis          Treatment                     Follow-up DEXA

Normal BMD      Prevention                    2-3 years

Osteopenia       Prevention/Therapy        1-2 years

Osteoporosis     Therapy                        Yearly



TECHNICAL DOCUMENTATION:  JOB ID:  [id number]

 2011 [employer]- All Rights Reserved



Reading location - IP/workstation name: [email or web address]

## 2018-07-09 ENCOUNTER — HOSPITAL ENCOUNTER (OUTPATIENT)
Dept: HOSPITAL 62 - OD | Age: 73
End: 2018-07-09
Attending: FAMILY MEDICINE
Payer: MEDICARE

## 2018-07-09 DIAGNOSIS — J44.9: Primary | ICD-10-CM

## 2018-07-09 PROCEDURE — 71046 X-RAY EXAM CHEST 2 VIEWS: CPT

## 2018-07-09 NOTE — RADIOLOGY REPORT (SQ)
EXAM DESCRIPTION:  CHEST PA/LATERAL



COMPLETED DATE/TIME:  7/9/2018 3:51 pm



REASON FOR STUDY:  COPD



COMPARISON:  1/26/2018 1/19/2018



EXAM PARAMETERS:  NUMBER OF VIEWS: two views

TECHNIQUE: Digital Frontal and Lateral radiographic views of the chest acquired.

RADIATION DOSE: NA

LIMITATIONS: none



FINDINGS:  LUNGS AND PLEURA:  Slight chronic tenting left hemidiaphragm and left base scar are atelec
tasis.  No acute pulmonary findings.  No pneumothorax or pleural effusion.

MEDIASTINUM AND HILAR STRUCTURES: No masses or contour abnormalities.

HEART AND VASCULAR STRUCTURES:  Cardiomegaly, stable finding.  Cardiac stent, unchanged finding. No e
vidence for failure.

BONES: No acute findings.

HARDWARE:  Cardiac pacemaker, unchanged finding.

OTHER: No other significant finding.



IMPRESSION:  1 No significant interval changes since the prior examinations dated 1/26/2018 and 1/19/
2018.  Cardiomegaly, stable fnding.



TECHNICAL DOCUMENTATION:  JOB ID:  [id number]

 2011 [employer]- All Rights Reserved



Reading location - IP/workstation name: [email or web address]

## 2018-07-20 ENCOUNTER — HOSPITAL ENCOUNTER (EMERGENCY)
Dept: HOSPITAL 62 - ER | Age: 73
LOS: 1 days | Discharge: HOME | End: 2018-07-21
Payer: MEDICARE

## 2018-07-20 DIAGNOSIS — I25.2: ICD-10-CM

## 2018-07-20 DIAGNOSIS — Z95.5: ICD-10-CM

## 2018-07-20 DIAGNOSIS — R91.8: ICD-10-CM

## 2018-07-20 DIAGNOSIS — I10: ICD-10-CM

## 2018-07-20 DIAGNOSIS — N20.0: ICD-10-CM

## 2018-07-20 DIAGNOSIS — J44.9: ICD-10-CM

## 2018-07-20 DIAGNOSIS — Z88.0: ICD-10-CM

## 2018-07-20 DIAGNOSIS — I49.01: ICD-10-CM
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
## 2018-07-20 DIAGNOSIS — R10.11: ICD-10-CM

## 2018-07-20 DIAGNOSIS — F17.200: ICD-10-CM

## 2018-07-20 DIAGNOSIS — Z95.810: ICD-10-CM

## 2018-07-20 DIAGNOSIS — I25.10: ICD-10-CM

## 2018-07-20 DIAGNOSIS — R07.81: Primary | ICD-10-CM

## 2018-07-20 LAB
ADD MANUAL DIFF: NO
ALBUMIN SERPL-MCNC: 3.8 G/DL (ref 3.5–5)
ALP SERPL-CCNC: 95 U/L (ref 38–126)
ALT SERPL-CCNC: 22 U/L (ref 9–52)
ANION GAP SERPL CALC-SCNC: 9 MMOL/L (ref 5–19)
APPEARANCE UR: (no result)
APTT PPP: YELLOW S
AST SERPL-CCNC: 19 U/L (ref 14–36)
BASOPHILS # BLD AUTO: 0 10^3/UL (ref 0–0.2)
BASOPHILS NFR BLD AUTO: 1.3 % (ref 0–2)
BILIRUB DIRECT SERPL-MCNC: 0.3 MG/DL (ref 0–0.4)
BILIRUB SERPL-MCNC: 0.5 MG/DL (ref 0.2–1.3)
BILIRUB UR QL STRIP: NEGATIVE
BUN SERPL-MCNC: 12 MG/DL (ref 7–20)
CALCIUM: 9.1 MG/DL (ref 8.4–10.2)
CHLORIDE SERPL-SCNC: 110 MMOL/L (ref 98–107)
CO2 SERPL-SCNC: 27 MMOL/L (ref 22–30)
EOSINOPHIL # BLD AUTO: 0.1 10^3/UL (ref 0–0.6)
EOSINOPHIL NFR BLD AUTO: 1.7 % (ref 0–6)
ERYTHROCYTE [DISTWIDTH] IN BLOOD BY AUTOMATED COUNT: 16.2 % (ref 11.5–14)
GLUCOSE SERPL-MCNC: 92 MG/DL (ref 75–110)
GLUCOSE UR STRIP-MCNC: NEGATIVE MG/DL
HCT VFR BLD CALC: 34 % (ref 36–47)
HGB BLD-MCNC: 11.5 G/DL (ref 12–15.5)
KETONES UR STRIP-MCNC: NEGATIVE MG/DL
LYMPHOCYTES # BLD AUTO: 1.8 10^3/UL (ref 0.5–4.7)
LYMPHOCYTES NFR BLD AUTO: 46.6 % (ref 13–45)
MCH RBC QN AUTO: 31.8 PG (ref 27–33.4)
MCHC RBC AUTO-ENTMCNC: 33.8 G/DL (ref 32–36)
MCV RBC AUTO: 94 FL (ref 80–97)
MONOCYTES # BLD AUTO: 0.2 10^3/UL (ref 0.1–1.4)
MONOCYTES NFR BLD AUTO: 4.7 % (ref 3–13)
NEUTROPHILS # BLD AUTO: 1.8 10^3/UL (ref 1.7–8.2)
NEUTS SEG NFR BLD AUTO: 45.7 % (ref 42–78)
NITRITE UR QL STRIP: NEGATIVE
PH UR STRIP: 6 [PH] (ref 5–9)
PLATELET # BLD: 207 10^3/UL (ref 150–450)
POTASSIUM SERPL-SCNC: 4.1 MMOL/L (ref 3.6–5)
PROT SERPL-MCNC: 6.4 G/DL (ref 6.3–8.2)
PROT UR STRIP-MCNC: NEGATIVE MG/DL
RBC # BLD AUTO: 3.62 10^6/UL (ref 3.72–5.28)
SODIUM SERPL-SCNC: 145.5 MMOL/L (ref 137–145)
SP GR UR STRIP: 1.02
TOTAL CELLS COUNTED % (AUTO): 100 %
UROBILINOGEN UR-MCNC: 4 MG/DL (ref ?–2)
WBC # BLD AUTO: 3.9 10^3/UL (ref 4–10.5)

## 2018-07-20 PROCEDURE — 74177 CT ABD & PELVIS W/CONTRAST: CPT

## 2018-07-20 PROCEDURE — 99284 EMERGENCY DEPT VISIT MOD MDM: CPT

## 2018-07-20 PROCEDURE — 85025 COMPLETE CBC W/AUTO DIFF WBC: CPT

## 2018-07-20 PROCEDURE — 81001 URINALYSIS AUTO W/SCOPE: CPT

## 2018-07-20 PROCEDURE — 80053 COMPREHEN METABOLIC PANEL: CPT

## 2018-07-20 PROCEDURE — 36415 COLL VENOUS BLD VENIPUNCTURE: CPT

## 2018-07-20 PROCEDURE — 71260 CT THORAX DX C+: CPT

## 2018-07-20 NOTE — ER DOCUMENT REPORT
ED General





- General


Chief Complaint: Rib Pain


Stated Complaint: RIGHT ABDOMEN PAIN


Time Seen by Provider: 18 19:37


Mode of Arrival: Ambulatory


Notes: 


Patient is a 73-year-old female that comes emergency department for chief 

complaint of fall injury 2 days ago, she states that she lost her balance when 

her leg gave out and she fell first onto a chair and then onto the floor, she 

did not hit her head, she states she has hurt since then but it seems worse 

today.  She reports painful breathing but denies difficulty breathing.  She 

denies dizziness or passing out.  She is on aspirin but no other 

anticoagulation.  Past medical history includes MI, hypertension, COPD, AICD.


TRAVEL OUTSIDE OF THE [place] IN LAST 30 DAYS: No





- Related Data


Allergies/Adverse Reactions: 


 





amoxicillin [Amoxicillin] Allergy (Intermediate, Verified 18 18:34)


 Hallucinations


Penicillins Allergy (Intermediate, Verified 18 18:34)


 Hallucinations











Past Medical History





- General


Information source: Patient





- Social History


Smoking Status: Current Every Day Smoker


Chew tobacco use (# tins/day): No


Frequency of alcohol use: None


Drug Abuse: None


Lives with: Family


Family History: Reviewed & Not Pertinent, Hypertension


Patient has suicidal ideation: No


Patient has homicidal ideation: No





- Past Medical History


Cardiac Medical History: Reports: Hx Congestive Heart Failure, Hx Coronary 

Artery Disease, Hx Heart Attack - , Hx Hypercholesterolemia, Hx Hypertension


   Denies: Hx Atrial Fibrillation, Hx Peripheral Vascular Disease, Hx Pulmonary 

Embolism, Hx Heart Murmur


Pulmonary Medical History: Reports: Hx Bronchitis, Hx COPD, Hx Pneumonia


   Denies: Hx Asthma, Hx Respiratory Failure, Hx Sleep Apnea, Hx Tuberculosis


Neurological Medical History: Denies: Hx Cerebrovascular Accident, Hx Seizures


Endocrine Medical History: Denies: Hx Diabetes Mellitus Type 1, Hx Diabetes 

Mellitus Type 2


Renal/ Medical History: Reports: Hx Renal Insufficiency.  Denies: Hx End 

Stage Renal Disease, Hx Kidney Stones, Hx Peritoneal Dialysis


Malignancy Medical History: Denies: Hx Leukemia, Hx Lung Cancer


GI Medical History: Reports: Hx Crohn's Disease - Patient has either Crohn's 

disease or ulcerative colitis, Hx Diverticulitis, Hx Gastroesophageal Reflux 

Disease.  Denies: Hx Hepatitis, Hx Hiatal Hernia, Hx Irritable Bowel, Hx Liver 

Failure, Hx Pancreatitis, Hx Ulcer


Musculoskeletal Medical History: Reports Hx Arthritis, Denies Hx Fibromyalgia, 

Denies Hx Muscular Dystrophy


Psychiatric Medical History: Reports: Hx Depression


   Denies: Hx Bipolar Disorder, Hx Post Traumatic Stress Disorder, Hx 

Schizophrenia


Traumatic Medical History: Reports: Hx Fractures - right wrist, left foot


Infectious Medical History: Denies: Hx Hepatitis, Hx HIV


Past Surgical History: Reports: Hx Appendectomy, Hx Cardiac Catheterization, Hx 

Cardiac Surgery - AICD for ventricular fibrillation, Hx  Section - x4, 

Hx Coronary Stent, Hx Hysterectomy, Hx Orthopedic Surgery - bilateral knee 

surgery, Hx Pacemaker, Hx Tonsillectomy.  Denies: Hx Bowel Surgery, Hx 

Cholecystectomy, Hx Colostomy, Hx Coronary Artery Bypass Graft, Hx Gastric 

Bypass Surgery, Hx Herniorrhaphy, Hx Mastectomy, Hx Open Heart Surgery, Hx 

Tubal Ligation





- Immunizations


Immunizations up to date: Yes


Hx Diphtheria, Pertussis, Tetanus Vaccination: Yes


Hx Pneumococcal Vaccination: 01/01/10





Review of Systems





- Review of Systems


Constitutional: No symptoms reported


EENT: No symptoms reported


Cardiovascular: No symptoms reported


Respiratory: See HPI


Gastrointestinal: See HPI


Genitourinary: No symptoms reported


Female Genitourinary: No symptoms reported


Musculoskeletal: See HPI


Skin: No symptoms reported


Hematologic/Lymphatic: No symptoms reported


Neurological/Psychological: No symptoms reported





Physical Exam





- Vital signs


Vitals: 


 











Temp Pulse Resp BP Pulse Ox


 


 97.8 F   65   16   166/71 H  98 


 


 18 19:06  18 19:06  18 19:06  18 19:06  18 19:06














- Notes


Notes: 





GENERAL: Alert, interacts well. No acute distress.


HEAD: Normocephalic, atraumatic.


EYES: Pupils equal, round, and reactive to light. Extraocular movements intact.


ENT: Oral mucosa moist, tongue midline. 


NECK: Full range of motion. Supple. Trachea midline.


LUNGS: Decreased breath sounds bilaterally with a few scant wheezes, no 

tachypnea, no signs of distress.  Tender over the right ribs generally, no 

ecchymosis, swelling, or crepitus noted.


HEART: Regular rate and rhythm. No murmur


ABDOMEN: Pain in the right side and right upper quadrant of the abdomen, no 

ecchymosis noted, otherwise unremarkable abdominal exam.


EXTREMITIES: Moves all 4 extremities spontaneously. No edema, normal radial and 

dorsalis pedis pulses bilaterally. No cyanosis.


BACK: no cervical, thoracic, lumbar midline tenderness. No saddle anesthesia, 

normal distal neurovascular exam. 


NEUROLOGICAL: Alert and oriented x3. Normal speech. [cranial nerves II through 

XII grossly intact]. 


PSYCH: Normal affect, normal mood.


SKIN: Warm, dry, normal turgor. No rashes or lesions noted.








Course





- Re-evaluation


Re-evalutation: 


Patient is well-appearing, however she is very tender over the right ribs and 

right upper quadrant of the abdomen.  No ecchymosis, swelling, or other signs 

of trauma.  She is hypertensive, no tachycardia, unremarkable vital signs 

otherwise.  Is alert, interactive, has normal neurological exam, denies head 

injury.





CBC, chemistry, urinalysis generally unremarkable.  CAT scan of the chest and 

abdomen performed because of patient's pain on exam, advanced age, and reported 

fall, the show incidental findings of left lung nodule, left nephrolithiasis, 

but no acute traumatic findings.  Discussed these results with patient, 

provided with copy, she states she will follow-up with her provider for 

additional evaluation and management, provided with a few pain medication to go 

home with tonight which she will use only if absolutely needed, otherwise she 

is to take Tylenol.  Discussed return precautions with patient, she states 

understanding and agreement.





- Vital Signs


Vital signs: 


 











Temp Pulse Resp BP Pulse Ox


 


 97.7 F   66   16   185/82 H  98 


 


 18 01:14  18 01:14  18 01:14  18 01:14  18 01:14














- Laboratory


Result Diagrams: 


 18 20:59





 18 22:32


Laboratory results interpreted by me: 


 











  18





  20:59 21:56 22:32


 


WBC  3.9 L  


 


RBC  3.62 L  


 


Hgb  11.5 L  


 


Hct  34.0 L  


 


RDW  16.2 H  


 


Lymphocytes %  46.6 H  


 


Sodium    145.5 H


 


Chloride    110 H


 


Urine Urobilinogen   4.0 H 














Discharge





- Discharge


Clinical Impression: 


 Rib pain





Fall


Qualifiers:


 Encounter type: initial encounter Qualified Code(s): W19.XXXA - Unspecified 

fall, initial encounter





Abdominal pain


Qualifiers:


 Abdominal location: right upper quadrant Qualified Code(s): R10.11 - Right 

upper quadrant pain





Condition: Stable


Disposition: HOME, SELF-CARE


Additional Instructions: 


Your CAT scan does not show any abnormalities from the fall, it shows a nodule 

in your left lung and a stone in your left kidney.  Please follow-up with your 

provider and give him a copy of the report for additional monitoring and 

management of the nodule in the lung.  This needs to be performed because this 

could develop into cancer which would be missed if you do not follow-up.





You have been provided with small amount of pain medication for your pain after 

the fall, only take if needed, take over-the-counter stool softener if needed 

for constipation because of the medication, take Tylenol instead if this is 

enough.  You should generally improve with time.  Return if you worsen 

including passing out, vomiting, difficulty breathing, fever, or any other 

concerning symptoms.


Forms:  Elevated Blood Pressure


Referrals: 


BISMARK CABALLERO MD [Primary Care Provider] - Follow up in 3-5 days

## 2018-07-20 NOTE — ER DOCUMENT REPORT
ED Medical Screen (RME)





- General


Chief Complaint: Rib Pain


Stated Complaint: RIGHT ABDOMEN PAIN


Time Seen by Provider: 18 19:37


Mode of Arrival: Ambulatory


Information source: Patient


Notes: 





73-year-old female presented to ED for complaint of right side of ribs and 

abdominal pain since Wednesday when she fell at home.  She states the pain is 

getting worse every day and she has not been seen by a doctor since it 

occurred.  She is extremely tender to the touch on the right ribs and abdomen.  

Patient has a history of high blood pressure COPD heart attack and implanted 

defibrillator cardiac stents and she is on aspirin.








I have greeted and performed a rapid initial assessment of this patient.  A 

comprehensive ED assessment and evaluation of the patient, analysis of test 

results and completion of medical decision making process will be conducted by 

an additional ED providers.


TRAVEL OUTSIDE OF THE [place] IN LAST 30 DAYS: No





- Related Data


Allergies/Adverse Reactions: 


 





amoxicillin [Amoxicillin] Allergy (Intermediate, Verified 18 18:34)


 Hallucinations


Penicillins Allergy (Intermediate, Verified 18 18:34)


 Hallucinations











Past Medical History





- Social History


Chew tobacco use (# tins/day): No


Frequency of alcohol use: None


Drug Abuse: None





- Past Medical History


Cardiac Medical History: Reports: Hx Congestive Heart Failure, Hx Coronary 

Artery Disease, Hx Heart Attack - , Hx Hypercholesterolemia, Hx Hypertension


   Denies: Hx Atrial Fibrillation, Hx Peripheral Vascular Disease, Hx Pulmonary 

Embolism, Hx Heart Murmur


Pulmonary Medical History: Reports: Hx Bronchitis, Hx COPD, Hx Pneumonia


   Denies: Hx Asthma, Hx Respiratory Failure, Hx Sleep Apnea, Hx Tuberculosis


Neurological Medical History: Denies: Hx Cerebrovascular Accident, Hx Seizures


Endocrine Medical History: Denies: Hx Diabetes Mellitus Type 1, Hx Diabetes 

Mellitus Type 2


Renal/ Medical History: Reports: Hx Renal Insufficiency.  Denies: Hx End 

Stage Renal Disease, Hx Kidney Stones, Hx Peritoneal Dialysis


Malignancy Medical History: Denies: Hx Leukemia, Hx Lung Cancer


GI Medical History: Reports: Hx Crohn's Disease - Patient has either Crohn's 

disease or ulcerative colitis, Hx Diverticulitis, Hx Gastroesophageal Reflux 

Disease.  Denies: Hx Hepatitis, Hx Hiatal Hernia, Hx Irritable Bowel, Hx Liver 

Failure, Hx Pancreatitis, Hx Ulcer


Musculoskeltal Medical History: Reports Hx Arthritis, Denies Hx Fibromyalgia, 

Denies Hx Muscular Dystrophy


Psychiatric Medical History: Reports: Hx Depression


   Denies: Hx Bipolar Disorder, Hx Post Traumatic Stress Disorder, Hx 

Schizophrenia


Traumatic Medical History: Reports: Hx Fractures - right wrist, left foot


Infectious Medical History: Denies: Hx Hepatitis, Hx HIV


Past Surgical History: Reports: Hx Appendectomy, Hx Cardiac Catheterization, Hx 

Cardiac Surgery - AICD for ventricular fibrillation, Hx  Section - x4, 

Hx Coronary Stent, Hx Hysterectomy, Hx Orthopedic Surgery - bilateral knee 

surgery, Hx Pacemaker, Hx Tonsillectomy.  Denies: Hx Bowel Surgery, Hx 

Cholecystectomy, Hx Colostomy, Hx Coronary Artery Bypass Graft, Hx Gastric 

Bypass Surgery, Hx Herniorrhaphy, Hx Mastectomy, Hx Open Heart Surgery, Hx 

Tubal Ligation





- Immunizations


Immunizations up to date: Yes


Hx Diphtheria, Pertussis, Tetanus Vaccination: Yes


History of Influenza Vaccine for 10/2017 - 3/2018 Season: Yes


Influenza Administration Date for 10/2017 - 3/2018 Season: 17





Physical Exam





- Vital signs


Vitals: 





 











Temp Pulse Resp BP Pulse Ox


 


 97.8 F   65   16   166/71 H  98 


 


 18 19:06  18 19:06  18 19:06  18 19:06  18 19:06














Course





- Vital Signs


Vital signs: 





 











Temp Pulse Resp BP Pulse Ox


 


 97.8 F   65   16   166/71 H  98 


 


 18 19:06  18 19:06  18 19:06  18 19:06  18 19:06














Doctor's Discharge





- Discharge


Referrals: 


BISMARK CABALLERO MD [Primary Care Provider] - Follow up as needed

## 2018-07-21 VITALS — DIASTOLIC BLOOD PRESSURE: 82 MMHG | SYSTOLIC BLOOD PRESSURE: 185 MMHG

## 2018-07-21 NOTE — RADIOLOGY REPORT (SQ)
CLINICAL HISTORY: fall, right chest and abd pain ,



EXAM: CT Chest, Abdomen and Pelvis with contrast 7/21/2018 12:00

AM CDT



COMPARISON: None.



TECHNIQUE: Following the administration of intravenous contrast,

Volumetric CT acquisition was performed through the chest,

abdomen, and pelvis. Images in the axial, coronal, and sagittal

planes were presented for interpretation.   Delayed excretory

phase images were also obtained.



This exam was performed according to our departmental

dose-optimization program, which includes automated exposure

control, adjustment of the mA and/or kV according to patient size

and/or use of iterative reconstruction technique.





Radiation dose/contrast:

DLP-633.08



FINDINGS:



Chest:

There are no rib fracture or pneumothorax.

There is no acute aortic traumatic injury or mediastinal

hematoma.

There is no pulmonary parenchymal contusion or hemorrhage.

There is no fracture or dislocation of the thoracic spine or

visualized shoulders.



There is a left upper lobe soft tissue density nodule on axial

image 21 measuring 1.1 cm in diameter.

There is an adjacent 3 mm left upper lobe nodule on axial image

22.



The lungs are otherwise clear without focal consolidation or

pleural effusion.

The heart is mildly enlarged with pacer leads in place. There are

surgical changes with epicardial pacing leads and radiodense

material in the distribution of the pericardium.



The thoracic aorta and its primary branches . 

The main pulmonary artery and visualized proximal

tracheobronchial tree are within normal limits.  

There are are no pathologically enlarged mediastinal, hilar,

supraclavicular, or axillary lymph nodes.



The visualized portions of the liver, spleen, pancreas, adrenal

glands, gallbladder, and kidneys are normal.



The soft tissue structures of the chest wall are normal.  

The visualized osseous structures are within normal limits for

the patient's age.





Abdomen/Pelvis:

There is no free air, blood, or fluid within the abdomen/pelvis.

There is no acute solid organ injury to the liver, spleen, or

kidneys.

There is no fracture or dislocation of the lumbar spine, bony

pelvis, or visualized ribs.





Within the upper abdomen, the liver and spleen are normal in size

and morphology.

The gallbladder is normal in appearance. There is no intra or

extrahepatic biliary ductal dilation.



The pancreas and adrenal glands are normal in appearance.



The right kidney is normal in size. The right ureter is normal in

caliber. There is a 3.3 cm cortical cyst along the upper pole the

right kidney as well as a 5.9 cm mid right renal cortical cyst..

There are no right renal calculi or distal obstructing stones.

There is no evidence of hydronephrosis/hydroureter.



The left kidney is normal in size. The left ureter is normal in

course and caliber. There is a 4 mm nonobstructing stone in the

lower pole left renal collecting system on axial image 65. There

are no distal obstructing stones or evidence of

hydronephrosis/hydroureter.





The stomach and small intestines are within normal limits for

this exam performed without enteric contrast.  The appendix is

well-visualized and normal, best seen on axial image 91 posterior

to the cecum.



The colon is stool filled and otherwise unremarkable.  

There are scattered descending and sigmoid colonic diverticula

without associated wall thickening or inflammatory changes.   



Within the pelvis, the bladder and rectum are normal. 

The uterus is surgically absent. The ovaries are not well

visualized..



There are no pathologically enlarged inguinal, retroperitoneal,

portacaval, or mesenteric lymph nodes.

The soft tissue structures of the abdominal wall are normal in

appearance.

The visualized osseous structures within normal limits for the

patient's age.



There are extensive vascular calcifications throughout the

abdominal aorta and iliac arteries which are otherwise normal in

course and caliber.



IMPRESSION:



Chest:



1. No acute intrathoracic traumatic injury.

2. No acute fracture or dislocation.

3. Left upper lobe lung nodules, given the patient's age and the

appearance of the nodules correlation with history of malignancy

is recommended. Further evaluation with tissue sampling or PET/CT

may be warranted.

4. Cardiomegaly with surgical changes.



Abdomen/Pelvis:



1. No acute intra-abdominal traumatic injury.

2. Status post hysterectomy.

3. Nonobstructing left renal calculus.

## 2018-11-22 ENCOUNTER — HOSPITAL ENCOUNTER (EMERGENCY)
Dept: HOSPITAL 62 - ER | Age: 73
Discharge: HOME | End: 2018-11-22
Payer: MEDICARE

## 2018-11-22 VITALS — SYSTOLIC BLOOD PRESSURE: 183 MMHG | DIASTOLIC BLOOD PRESSURE: 77 MMHG

## 2018-11-22 DIAGNOSIS — I25.2: ICD-10-CM

## 2018-11-22 DIAGNOSIS — J44.9: ICD-10-CM

## 2018-11-22 DIAGNOSIS — M25.50: ICD-10-CM

## 2018-11-22 DIAGNOSIS — M79.10: ICD-10-CM

## 2018-11-22 DIAGNOSIS — I25.10: ICD-10-CM

## 2018-11-22 DIAGNOSIS — Z95.5: ICD-10-CM

## 2018-11-22 DIAGNOSIS — R29.6: Primary | ICD-10-CM

## 2018-11-22 DIAGNOSIS — R42: ICD-10-CM

## 2018-11-22 DIAGNOSIS — F17.210: ICD-10-CM

## 2018-11-22 DIAGNOSIS — I10: ICD-10-CM

## 2018-11-22 DIAGNOSIS — R53.1: ICD-10-CM

## 2018-11-22 LAB
ADD MANUAL DIFF: NO
ALBUMIN SERPL-MCNC: 4.2 G/DL (ref 3.5–5)
ALP SERPL-CCNC: 118 U/L (ref 38–126)
ALT SERPL-CCNC: 17 U/L (ref 9–52)
ANION GAP SERPL CALC-SCNC: 9 MMOL/L (ref 5–19)
APPEARANCE UR: CLEAR
APTT PPP: COLORLESS S
AST SERPL-CCNC: 19 U/L (ref 14–36)
BASOPHILS # BLD AUTO: 0 10^3/UL (ref 0–0.2)
BASOPHILS NFR BLD AUTO: 0.1 % (ref 0–2)
BILIRUB DIRECT SERPL-MCNC: 0.2 MG/DL (ref 0–0.4)
BILIRUB SERPL-MCNC: 0.6 MG/DL (ref 0.2–1.3)
BILIRUB UR QL STRIP: NEGATIVE
BUN SERPL-MCNC: 27 MG/DL (ref 7–20)
CALCIUM: 9.6 MG/DL (ref 8.4–10.2)
CHLORIDE SERPL-SCNC: 104 MMOL/L (ref 98–107)
CK MB SERPL-MCNC: 0.31 NG/ML (ref ?–4.55)
CK SERPL-CCNC: 59 U/L (ref 30–135)
CO2 SERPL-SCNC: 30 MMOL/L (ref 22–30)
EOSINOPHIL # BLD AUTO: 0 10^3/UL (ref 0–0.6)
EOSINOPHIL NFR BLD AUTO: 0.8 % (ref 0–6)
ERYTHROCYTE [DISTWIDTH] IN BLOOD BY AUTOMATED COUNT: 15.8 % (ref 11.5–14)
GLUCOSE SERPL-MCNC: 80 MG/DL (ref 75–110)
GLUCOSE UR STRIP-MCNC: NEGATIVE MG/DL
HCT VFR BLD CALC: 35.7 % (ref 36–47)
HGB BLD-MCNC: 11.9 G/DL (ref 12–15.5)
KETONES UR STRIP-MCNC: NEGATIVE MG/DL
LYMPHOCYTES # BLD AUTO: 1.5 10^3/UL (ref 0.5–4.7)
LYMPHOCYTES NFR BLD AUTO: 42.9 % (ref 13–45)
MCH RBC QN AUTO: 31.4 PG (ref 27–33.4)
MCHC RBC AUTO-ENTMCNC: 33.5 G/DL (ref 32–36)
MCV RBC AUTO: 94 FL (ref 80–97)
MONOCYTES # BLD AUTO: 0.2 10^3/UL (ref 0.1–1.4)
MONOCYTES NFR BLD AUTO: 4.9 % (ref 3–13)
NEUTROPHILS # BLD AUTO: 1.8 10^3/UL (ref 1.7–8.2)
NEUTS SEG NFR BLD AUTO: 51.3 % (ref 42–78)
NITRITE UR QL STRIP: NEGATIVE
PH UR STRIP: 7 [PH] (ref 5–9)
PLATELET # BLD: 217 10^3/UL (ref 150–450)
POTASSIUM SERPL-SCNC: 4.2 MMOL/L (ref 3.6–5)
PROT SERPL-MCNC: 6.9 G/DL (ref 6.3–8.2)
PROT UR STRIP-MCNC: NEGATIVE MG/DL
RBC # BLD AUTO: 3.81 10^6/UL (ref 3.72–5.28)
SODIUM SERPL-SCNC: 142.8 MMOL/L (ref 137–145)
SP GR UR STRIP: 1
TOTAL CELLS COUNTED % (AUTO): 100 %
TROPONIN I SERPL-MCNC: < 0.012 NG/ML
UROBILINOGEN UR-MCNC: NEGATIVE MG/DL (ref ?–2)
WBC # BLD AUTO: 3.6 10^3/UL (ref 4–10.5)

## 2018-11-22 PROCEDURE — 70450 CT HEAD/BRAIN W/O DYE: CPT

## 2018-11-22 PROCEDURE — 82550 ASSAY OF CK (CPK): CPT

## 2018-11-22 PROCEDURE — 84484 ASSAY OF TROPONIN QUANT: CPT

## 2018-11-22 PROCEDURE — 81001 URINALYSIS AUTO W/SCOPE: CPT

## 2018-11-22 PROCEDURE — 36415 COLL VENOUS BLD VENIPUNCTURE: CPT

## 2018-11-22 PROCEDURE — 85025 COMPLETE CBC W/AUTO DIFF WBC: CPT

## 2018-11-22 PROCEDURE — 93010 ELECTROCARDIOGRAM REPORT: CPT

## 2018-11-22 PROCEDURE — 82553 CREATINE MB FRACTION: CPT

## 2018-11-22 PROCEDURE — 99285 EMERGENCY DEPT VISIT HI MDM: CPT

## 2018-11-22 PROCEDURE — 73522 X-RAY EXAM HIPS BI 3-4 VIEWS: CPT

## 2018-11-22 PROCEDURE — 80053 COMPREHEN METABOLIC PANEL: CPT

## 2018-11-22 PROCEDURE — 93005 ELECTROCARDIOGRAM TRACING: CPT

## 2018-11-22 NOTE — RADIOLOGY REPORT (SQ)
EXAM DESCRIPTION:  KNEE RIGHT 3 VIEWS; HIP BILATERAL



COMPLETED DATE/TIME:  11/22/2018 5:58 pm



REASON FOR STUDY:  pain



COMPARISON:  None.



FINDINGS:  Bilateral hips:  Two views including AP pelvis with hips positioned AP and frog-lateral.  
Osteopenic.  Mild hip degenerative spurring.  No fracture or bone lesion.  Limited assessment of the 
sacrum given overlying vascular calcification and osteopenia.

Three views right knee:  AP, lateral and patellofemoral.  Lateral facet joint space narrowing in the 
patellofemoral articulation.  Osteopenic.  No fracture.



IMPRESSION:  No hip or right knee fracture identified.  Osteopenic.



TECHNICAL DOCUMENTATION:  JOB ID:  [id number]



Reading location - IP/workstation name: [email or web address]

## 2018-11-22 NOTE — RADIOLOGY REPORT (SQ)
EXAM DESCRIPTION:  CT HEAD WITHOUT



COMPLETED DATE/TIME:  11/22/2018 2:20 pm



REASON FOR STUDY:  Dizziness



COMPARISON:  CT brain 2/1/2013, 10/10/2017, 1/19/2018



TECHNIQUE:  Axial images acquired through the brain without intravenous contrast.  Images reviewed wi
th bone, brain and subdural windows.  Additional sagittal and coronal reconstructions were generated.
 Images stored on PACS.

All CT scanners at this facility use dose modulation, iterative reconstruction, and/or weight based d
osing when appropriate to reduce radiation dose to as low as reasonably achievable (ALARA).

[facility]: Dose Right  [facility]: CareDose    [facility]: Dose Right    [facility]: Teradose 4D    [facility]: Smart proVITAL



RADIATION DOSE:  CT Rad equipment meets quality standard of care and radiation dose reduction techniq
ues were employed. CTDIvol: 53.2 mGy. DLP: 1044 mGy-cm. mGy.



LIMITATIONS:  None.



FINDINGS:  VENTRICLES: Normal size and contour.

CEREBRUM: No masses.  No hemorrhage.  No midline shift.  No evidence for acute infarction. Normal gra
y/white matter differentiation. No areas of low density in the white matter.

CEREBELLUM: No masses.  No hemorrhage.  Old stable lacunar infarcts in the right cerebellar hemispher
e and left luis.  No evidence for acute infarction.

EXTRAAXIAL SPACES: No fluid collections.  No masses.

ORBITS AND GLOBE: No intra- or extraconal masses.  Normal contour of globe without masses.

CALVARIUM: No fracture.

PARANASAL SINUSES: No fluid or mucosal thickening.

SOFT TISSUES: No mass or hematoma.

OTHER: No other significant finding.



IMPRESSION:  No acute findings

EVIDENCE OF ACUTE STROKE: NO.



COMMENT:  Quality ID # 436: Final reports with documentation of one or more dose reduction techniques
 (e.g., Automated exposure control, adjustment of the mA and/or kV according to patient size, use of 
iterative reconstruction technique)



TECHNICAL DOCUMENTATION:  JOB ID:  [id number]

 2011 [employer]- All Rights Reserved



Reading location - IP/workstation name: [email or web address]

## 2018-11-22 NOTE — RADIOLOGY REPORT (SQ)
EXAM DESCRIPTION:  KNEE RIGHT 3 VIEWS; HIP BILATERAL



COMPLETED DATE/TIME:  11/22/2018 5:58 pm



REASON FOR STUDY:  pain



COMPARISON:  None.



FINDINGS:  Bilateral hips:  Two views including AP pelvis with hips positioned AP and frog-lateral.  
Osteopenic.  Mild hip degenerative spurring.  No fracture or bone lesion.  Limited assessment of the 
sacrum given overlying vascular calcification and osteopenia.

Three views right knee:  AP, lateral and patellofemoral.  Lateral facet joint space narrowing in the 
patellofemoral articulation.  Osteopenic.  No fracture.



IMPRESSION:  No hip or right knee fracture identified.  Osteopenic.



TECHNICAL DOCUMENTATION:  JOB ID:  [id number]



Reading location - IP/workstation name: [email or web address]

## 2018-11-22 NOTE — ER DOCUMENT REPORT
ED Medical Screen (RME)





- General


Chief Complaint: Dizziness


Stated Complaint: DIZZY


Time Seen by Provider: 18 13:53


Notes: 





73 years old female with a history of hypertension coronary artery disease 

smoking had MI before presents today with dizziness lightheadedness and also 

had multiple falls.  She is on multiple medications.  Which include isosorbide 

dinitrate, carvedilol, amlodipine.








TRAVEL OUTSIDE OF THE [place] IN LAST 30 DAYS: No





- Related Data


Allergies/Adverse Reactions: 


 





No Known Allergies Allergy (Verified 18 13:24)


 











Past Medical History





- Past Medical History


Cardiac Medical History: Reports: Hx Congestive Heart Failure, Hx Coronary 

Artery Disease, Hx Heart Attack - , Hx Hypercholesterolemia, Hx Hypertension


   Denies: Hx Atrial Fibrillation, Hx Peripheral Vascular Disease, Hx Pulmonary 

Embolism, Hx Heart Murmur


Pulmonary Medical History: Reports: Hx Bronchitis, Hx COPD, Hx Pneumonia


   Denies: Hx Asthma, Hx Respiratory Failure, Hx Sleep Apnea, Hx Tuberculosis


Neurological Medical History: Denies: Hx Cerebrovascular Accident, Hx Seizures


Endocrine Medical History: Denies: Hx Diabetes Mellitus Type 1, Hx Diabetes 

Mellitus Type 2


Renal/ Medical History: Reports: Hx Renal Insufficiency.  Denies: Hx End 

Stage Renal Disease, Hx Kidney Stones, Hx Peritoneal Dialysis


Malignancy Medical History: Denies: Hx Leukemia, Hx Lung Cancer


GI Medical History: Reports: Hx Crohn's Disease - Patient has either Crohn's 

disease or ulcerative colitis, Hx Diverticulitis, Hx Gastroesophageal Reflux 

Disease.  Denies: Hx Hepatitis, Hx Hiatal Hernia, Hx Irritable Bowel, Hx Liver 

Failure, Hx Pancreatitis, Hx Ulcer


Musculoskeltal Medical History: Reports Hx Arthritis, Denies Hx Fibromyalgia, 

Denies Hx Muscular Dystrophy


Psychiatric Medical History: Reports: Hx Depression


   Denies: Hx Bipolar Disorder, Hx Post Traumatic Stress Disorder, Hx 

Schizophrenia


Traumatic Medical History: Reports: Hx Fractures - right wrist, left foot


Infectious Medical History: Denies: Hx Hepatitis, Hx HIV


Past Surgical History: Reports: Hx Appendectomy, Hx Cardiac Catheterization, Hx 

Cardiac Surgery - AICD for ventricular fibrillation, Hx  Section - x4, 

Hx Coronary Stent, Hx Hysterectomy, Hx Orthopedic Surgery - bilateral knee 

surgery, Hx Pacemaker, Hx Tonsillectomy.  Denies: Hx Bowel Surgery, Hx 

Cholecystectomy, Hx Colostomy, Hx Coronary Artery Bypass Graft, Hx Gastric 

Bypass Surgery, Hx Herniorrhaphy, Hx Mastectomy, Hx Open Heart Surgery, Hx 

Tubal Ligation





- Immunizations


Immunizations up to date: Yes


Hx Diphtheria, Pertussis, Tetanus Vaccination: Yes


History of Influenza Vaccine for 10/2017 - 3/2018 Season: Yes


Influenza Administration Date for 10/2017 - 3/2018 Season: 17





Physical Exam





- Vital signs


Vitals: 





 











Temp Pulse Resp BP Pulse Ox


 


 97.9 F   59 L  18   165/74 H  97 


 


 18 13:39  18 13:39  18 13:39  18 13:39  18 13:39














Course





- Vital Signs


Vital signs: 





 











Temp Pulse Resp BP Pulse Ox


 


 97.9 F   59 L  18   165/74 H  97 


 


 18 13:39  18 13:39  18 13:39  18 13:39  18 13:39














Doctor's Discharge





- Discharge


Referrals: 


BISMARK CABALLERO MD [Primary Care Provider] - Follow up as needed

## 2018-11-22 NOTE — EKG REPORT
SEVERITY:- ABNORMAL ECG -

SINUS RHYTHM

ABNORMAL T, CONSIDER ISCHEMIA, LATERAL LEADS

:

Confirmed by: Mica Hyatt MD 22-Nov-2018 19:53:30

## 2018-12-10 ENCOUNTER — HOSPITAL ENCOUNTER (EMERGENCY)
Dept: HOSPITAL 62 - ER | Age: 73
Discharge: HOME | End: 2018-12-10
Payer: MEDICARE

## 2018-12-10 VITALS — SYSTOLIC BLOOD PRESSURE: 163 MMHG | DIASTOLIC BLOOD PRESSURE: 78 MMHG

## 2018-12-10 DIAGNOSIS — I11.0: ICD-10-CM

## 2018-12-10 DIAGNOSIS — Z95.810: ICD-10-CM

## 2018-12-10 DIAGNOSIS — J44.9: ICD-10-CM

## 2018-12-10 DIAGNOSIS — R07.9: ICD-10-CM

## 2018-12-10 DIAGNOSIS — I50.9: ICD-10-CM
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
## 2018-12-10 DIAGNOSIS — I25.10: ICD-10-CM

## 2018-12-10 DIAGNOSIS — Z91.81: ICD-10-CM

## 2018-12-10 DIAGNOSIS — S46.812A: Primary | ICD-10-CM

## 2018-12-10 DIAGNOSIS — M25.512: ICD-10-CM

## 2018-12-10 DIAGNOSIS — Z87.891: ICD-10-CM

## 2018-12-10 LAB
ADD MANUAL DIFF: NO
ALBUMIN SERPL-MCNC: 4.2 G/DL (ref 3.5–5)
ALP SERPL-CCNC: 103 U/L (ref 38–126)
ALT SERPL-CCNC: 16 U/L (ref 9–52)
ANION GAP SERPL CALC-SCNC: 11 MMOL/L (ref 5–19)
APTT BLD: 27.6 SEC (ref 23.5–35.8)
AST SERPL-CCNC: 23 U/L (ref 14–36)
BASOPHILS # BLD AUTO: 0 10^3/UL (ref 0–0.2)
BASOPHILS NFR BLD AUTO: 0.3 % (ref 0–2)
BILIRUB DIRECT SERPL-MCNC: 0.4 MG/DL (ref 0–0.4)
BILIRUB SERPL-MCNC: 0.9 MG/DL (ref 0.2–1.3)
BUN SERPL-MCNC: 18 MG/DL (ref 7–20)
CALCIUM: 9.4 MG/DL (ref 8.4–10.2)
CHLORIDE SERPL-SCNC: 109 MMOL/L (ref 98–107)
CK MB SERPL-MCNC: 0.36 NG/ML (ref ?–4.55)
CK SERPL-CCNC: 53 U/L (ref 30–135)
CO2 SERPL-SCNC: 26 MMOL/L (ref 22–30)
EOSINOPHIL # BLD AUTO: 0 10^3/UL (ref 0–0.6)
EOSINOPHIL NFR BLD AUTO: 0.8 % (ref 0–6)
ERYTHROCYTE [DISTWIDTH] IN BLOOD BY AUTOMATED COUNT: 16.3 % (ref 11.5–14)
GLUCOSE SERPL-MCNC: 90 MG/DL (ref 75–110)
HCT VFR BLD CALC: 37.4 % (ref 36–47)
HGB BLD-MCNC: 12.4 G/DL (ref 12–15.5)
INR PPP: 0.95
LYMPHOCYTES # BLD AUTO: 1.3 10^3/UL (ref 0.5–4.7)
LYMPHOCYTES NFR BLD AUTO: 40.3 % (ref 13–45)
MCH RBC QN AUTO: 31.2 PG (ref 27–33.4)
MCHC RBC AUTO-ENTMCNC: 33.1 G/DL (ref 32–36)
MCV RBC AUTO: 94 FL (ref 80–97)
MONOCYTES # BLD AUTO: 0.2 10^3/UL (ref 0.1–1.4)
MONOCYTES NFR BLD AUTO: 5.6 % (ref 3–13)
NEUTROPHILS # BLD AUTO: 1.8 10^3/UL (ref 1.7–8.2)
NEUTS SEG NFR BLD AUTO: 53 % (ref 42–78)
NT PRO BNP: 297 PG/ML (ref ?–450)
PLATELET # BLD: 209 10^3/UL (ref 150–450)
POTASSIUM SERPL-SCNC: 4.6 MMOL/L (ref 3.6–5)
PROT SERPL-MCNC: 7.2 G/DL (ref 6.3–8.2)
PROTHROMBIN TIME: 13.2 SEC (ref 11.4–15.4)
RBC # BLD AUTO: 3.97 10^6/UL (ref 3.72–5.28)
SODIUM SERPL-SCNC: 145.7 MMOL/L (ref 137–145)
TOTAL CELLS COUNTED % (AUTO): 100 %
TROPONIN I SERPL-MCNC: < 0.012 NG/ML
WBC # BLD AUTO: 3.3 10^3/UL (ref 4–10.5)

## 2018-12-10 PROCEDURE — 80053 COMPREHEN METABOLIC PANEL: CPT

## 2018-12-10 PROCEDURE — 99285 EMERGENCY DEPT VISIT HI MDM: CPT

## 2018-12-10 PROCEDURE — 84484 ASSAY OF TROPONIN QUANT: CPT

## 2018-12-10 PROCEDURE — 93010 ELECTROCARDIOGRAM REPORT: CPT

## 2018-12-10 PROCEDURE — 85610 PROTHROMBIN TIME: CPT

## 2018-12-10 PROCEDURE — 82550 ASSAY OF CK (CPK): CPT

## 2018-12-10 PROCEDURE — 36415 COLL VENOUS BLD VENIPUNCTURE: CPT

## 2018-12-10 PROCEDURE — 85025 COMPLETE CBC W/AUTO DIFF WBC: CPT

## 2018-12-10 PROCEDURE — 85730 THROMBOPLASTIN TIME PARTIAL: CPT

## 2018-12-10 PROCEDURE — 83880 ASSAY OF NATRIURETIC PEPTIDE: CPT

## 2018-12-10 PROCEDURE — 93005 ELECTROCARDIOGRAM TRACING: CPT

## 2018-12-10 PROCEDURE — 82553 CREATINE MB FRACTION: CPT

## 2018-12-10 PROCEDURE — 71045 X-RAY EXAM CHEST 1 VIEW: CPT

## 2018-12-10 NOTE — ER DOCUMENT REPORT
ED General





- General


Chief Complaint: Chest Pain


Stated Complaint: MICHELA MAY HAVE GONE OFF


Time Seen by Provider: 12/10/18 09:47


Mode of Arrival: Ambulatory


Information source: Patient


TRAVEL OUTSIDE OF THE [place] IN LAST 30 DAYS: No





- HPI


Patient complains to provider of: Shoulder pain


Onset: Other - 73-year-old female presents for evaluation of pain in her left 

shoulder.  She notes that her heart monitor did light up suggesting that she be 

evaluated for it, she called her primary physician who encouraged her to come 

to the emergency room.  She denies any chest pain palpitations shocks shortness 

of breath or other symptoms.  She notes that she has been having multiple falls 

related to some unsteadiness on her feet and is having pain in her left 

shoulder thereafter. Denies any fevers or chills, denies any abdominal pain 

diarrhea constipation dysuria or rashes.





- Related Data


Allergies/Adverse Reactions: 


 





No Known Allergies Allergy (Verified 12/10/18 10:14)


 











Past Medical History





- General


Information source: Patient





- Social History


Smoking Status: Former Smoker


Frequency of alcohol use: None


Drug Abuse: None


Lives with: Alone


Family History: Reviewed & Not Pertinent, Hypertension





- Past Medical History


Cardiac Medical History: Reports: Hx Congestive Heart Failure, Hx Coronary 

Artery Disease, Hx Heart Attack - , Hx Hypercholesterolemia, Hx Hypertension


   Denies: Hx Atrial Fibrillation, Hx Peripheral Vascular Disease, Hx Pulmonary 

Embolism, Hx Heart Murmur


Pulmonary Medical History: Reports: Hx Bronchitis, Hx COPD, Hx Pneumonia


   Denies: Hx Asthma, Hx Respiratory Failure, Hx Sleep Apnea, Hx Tuberculosis


Neurological Medical History: Denies: Hx Cerebrovascular Accident, Hx Seizures


Endocrine Medical History: Denies: Hx Diabetes Mellitus Type 1, Hx Diabetes 

Mellitus Type 2


Renal/ Medical History: Reports: Hx Renal Insufficiency.  Denies: Hx End 

Stage Renal Disease, Hx Kidney Stones, Hx Peritoneal Dialysis


Malignancy Medical History: Denies: Hx Leukemia, Hx Lung Cancer


GI Medical History: Reports: Hx Crohn's Disease - Patient has either Crohn's 

disease or ulcerative colitis, Hx Diverticulitis, Hx Gastroesophageal Reflux 

Disease.  Denies: Hx Hepatitis, Hx Hiatal Hernia, Hx Irritable Bowel, Hx Liver 

Failure, Hx Pancreatitis, Hx Ulcer


Musculoskeletal Medical History: Reports Hx Arthritis, Denies Hx Fibromyalgia, 

Denies Hx Muscular Dystrophy


Psychiatric Medical History: Reports: Hx Depression


   Denies: Hx Bipolar Disorder, Hx Post Traumatic Stress Disorder, Hx 

Schizophrenia


Traumatic Medical History: Reports: Hx Fractures - right wrist, left foot


Infectious Medical History: Denies: Hx Hepatitis, Hx HIV


Past Surgical History: Reports: Hx Appendectomy, Hx Cardiac Catheterization, Hx 

Cardiac Surgery - AICD for ventricular fibrillation, Hx  Section - x4, 

Hx Coronary Stent, Hx Hysterectomy, Hx Orthopedic Surgery - bilateral knee 

surgery, Hx Pacemaker, Hx Tonsillectomy.  Denies: Hx Bowel Surgery, Hx 

Cholecystectomy, Hx Colostomy, Hx Coronary Artery Bypass Graft, Hx Gastric 

Bypass Surgery, Hx Herniorrhaphy, Hx Mastectomy, Hx Open Heart Surgery, Hx 

Tubal Ligation





- Immunizations


Immunizations up to date: Yes


Hx Diphtheria, Pertussis, Tetanus Vaccination: Yes


Hx Pneumococcal Vaccination: 01/01/10





Review of Systems





- Review of Systems


-: Yes All other systems reviewed and negative





Physical Exam





- Vital signs


Vitals: 


 











Temp Pulse Resp BP Pulse Ox


 


 97.8 F   63   14   159/78 H  97 


 


 12/10/18 09:38  12/10/18 09:38  12/10/18 09:38  12/10/18 09:38  12/10/18 09:38














- General


General appearance: Appears well, Alert





- HEENT


Head: Normocephalic, Atraumatic


Eyes: Normal


Pupils: PERRL





- Respiratory


Respiratory status: No respiratory distress


Chest status: Nontender


Breath sounds: Normal


Chest palpation: Normal





- Cardiovascular


Rhythm: Regular


Heart sounds: Normal auscultation


Murmur: No





- Abdominal


Inspection: Normal


Distension: No distension


Bowel sounds: Normal


Tenderness: Nontender


Organomegaly: No organomegaly





- Back


Back: Normal, Nontender





- Extremities


General upper extremity: Other - Tenderness along the left trapezius, 

tenderness along the left SCM, normal range of motion in the head, neck, 

shoulder, deltoid as well as biceps


General lower extremity: Normal inspection, Nontender, Normal color, Normal ROM

, Normal temperature, Normal weight bearing.  No: Jorge's sign





- Neurological


Neuro grossly intact: Yes


Cognition: Normal


Orientation: AAOx4


Olvin Coma Scale Eye Opening: Spontaneous


[place] Coma Scale Verbal: Oriented


[place] Coma Scale Motor: Obeys Commands


[place] Coma Scale Total: 15


Speech: Normal


Motor strength normal: LUE, RUE, LLE, RLE


Sensory: Normal





- Psychological


Associated symptoms: Normal affect, Normal mood





Course





- Re-evaluation


Re-evalutation: 





12/10/18 15:45


73-year-old female who presents for evaluation of concern of AICD firing.


Through triage she had labs ordered including cardiac markers chest x-ray 

proBNP and interrogation of pacemaker.


Patient's initial lab workup was unrevealing, negative troponin, on examination 

she has marked tenderness along the left trapezius.  Likely she has a muscle 

spasm as result of fall.


Pacemaker interrogation demonstrates no obvious shocks, no arrhythmias.


Patient with 2- troponins emergency department, unremarkable cardiac monitoring

, unremarkable chest x-ray.


Administer Lidoderm patch as well as Tylenol to help with her muscle spasm.


We will administer Flexeril and low-dose to help with muscle spasm.  She was 

given return precautions and encouraged follow-up with her primary cardiologist 

this week as she is already scheduled.  I did call Dr. saleem Caballero to inform 

him that the patient be going home he says that this seems reasonable at this 

time.








- Vital Signs


Vital signs: 


 











Temp Pulse Resp BP Pulse Ox


 


 97.8 F   63   16   163/78 H  98 


 


 12/10/18 09:38  12/10/18 09:38  12/10/18 14:01  12/10/18 14:01  12/10/18 14:01














- Laboratory


Result Diagrams: 


 12/10/18 10:09





 12/10/18 10:09


Laboratory results interpreted by me: 


 











  12/10/18 12/10/18





  10:09 10:09


 


WBC  3.3 L 


 


RDW  16.3 H 


 


Sodium   145.7 H


 


Chloride   109 H


 


Est GFR (Non-Af Amer)   49 L














Discharge





- Discharge


Clinical Impression: 


 AICD (automatic cardioverter/defibrillator) present





Trapezius muscle strain


Qualifiers:


 Encounter type: initial encounter Laterality: left Qualified Code(s): S46.812A 

- Strain of other muscles, fascia and tendons at shoulder and upper arm level, 

left arm, initial encounter





Condition: Good


Disposition: HOME, SELF-CARE


Instructions:  Muscle Relaxers ([facility]), Muscle Strain ([facility])


Additional Instructions: 


You were seen today in the emergency department for the pain in your left 

shoulder as well as concerned that you had an issue with your pacemaker.


You had an evaluation including a physical exam as well as blood work and an x-

ray.


I believe that your pacemaker did not fire, and appears to be functioning 

normally.


Your marker for damage to the heart was normal.


Your physical examination demonstrated what appears to be a muscle strain in 

the left shoulder.


Use the medication prescribed to you as needed for the pain in your shoulder.  

Keep your appointment with your cardiologist.  Return for worsening fevers, 

chills or other symptoms.


Prescriptions: 


Cyclobenzaprine HCl [Flexeril 5 mg Tablet] 5 mg PO TID PRN #15 tablet


 PRN Reason: For Pain Scale 4-5


Lidocaine HCl [Xylocaine 5% Ointment 35.44 gm] 35.44 applic TP DAILY 7 Days #1 

tube


Referrals: 


BISMARK CABALLERO MD [Primary Care Provider] - Follow up as needed

## 2018-12-10 NOTE — ER DOCUMENT REPORT
ED Medical Screen (RME)





- General


Chief Complaint: Chest Pain


Stated Complaint: DEFIBLIRATOR MAY HAVE GONE OFF


Time Seen by Provider: 12/10/18 09:47


TRAVEL OUTSIDE OF THE [place] IN LAST 30 DAYS: No





- HPI


Notes: 





12/10/18 09:48


6 approximately 15 this morning her defibrillator fired.  Patient called her 

PCP later this Caballero told to come to the ER cardiologist Dr. Cabrera





- Related Data


Allergies/Adverse Reactions: 


 





No Known Allergies Allergy (Verified 18 13:24)


 











Past Medical History





- Past Medical History


Cardiac Medical History: Reports: Hx Congestive Heart Failure, Hx Coronary 

Artery Disease, Hx Heart Attack - , Hx Hypercholesterolemia, Hx Hypertension


   Denies: Hx Atrial Fibrillation, Hx Peripheral Vascular Disease, Hx Pulmonary 

Embolism, Hx Heart Murmur


Pulmonary Medical History: Reports: Hx Bronchitis, Hx COPD, Hx Pneumonia


   Denies: Hx Asthma, Hx Respiratory Failure, Hx Sleep Apnea, Hx Tuberculosis


Neurological Medical History: Denies: Hx Cerebrovascular Accident, Hx Seizures


Endocrine Medical History: Denies: Hx Diabetes Mellitus Type 1, Hx Diabetes 

Mellitus Type 2


Renal/ Medical History: Reports: Hx Renal Insufficiency.  Denies: Hx End 

Stage Renal Disease, Hx Kidney Stones, Hx Peritoneal Dialysis


Malignancy Medical History: Denies: Hx Leukemia, Hx Lung Cancer


GI Medical History: Reports: Hx Crohn's Disease - Patient has either Crohn's 

disease or ulcerative colitis, Hx Diverticulitis, Hx Gastroesophageal Reflux 

Disease.  Denies: Hx Hepatitis, Hx Hiatal Hernia, Hx Irritable Bowel, Hx Liver 

Failure, Hx Pancreatitis, Hx Ulcer


Musculoskeltal Medical History: Reports Hx Arthritis, Denies Hx Fibromyalgia, 

Denies Hx Muscular Dystrophy


Psychiatric Medical History: Reports: Hx Depression


   Denies: Hx Bipolar Disorder, Hx Post Traumatic Stress Disorder, Hx 

Schizophrenia


Traumatic Medical History: Reports: Hx Fractures - right wrist, left foot


Infectious Medical History: Denies: Hx Hepatitis, Hx HIV


Past Surgical History: Reports: Hx Appendectomy, Hx Cardiac Catheterization, Hx 

Cardiac Surgery - AICD for ventricular fibrillation, Hx  Section - x4, 

Hx Coronary Stent, Hx Hysterectomy, Hx Orthopedic Surgery - bilateral knee 

surgery, Hx Pacemaker, Hx Tonsillectomy.  Denies: Hx Bowel Surgery, Hx 

Cholecystectomy, Hx Colostomy, Hx Coronary Artery Bypass Graft, Hx Gastric 

Bypass Surgery, Hx Herniorrhaphy, Hx Mastectomy, Hx Open Heart Surgery, Hx 

Tubal Ligation





- Immunizations


Immunizations up to date: Yes


Hx Diphtheria, Pertussis, Tetanus Vaccination: Yes


History of Influenza Vaccine for 10/2017 - 3/2018 Season: Yes


Influenza Administration Date for 10/2017 - 3/2018 Season: 17





Review of Systems





- Review of Systems


Cardiovascular: Other - AICD firing





Physical Exam





- Vital signs


Vitals: 





 











Temp Pulse Resp BP Pulse Ox


 


 97.8 F   63   14   159/78 H  97 


 


 12/10/18 09:38  12/10/18 09:38  12/10/18 09:38  12/10/18 09:38  12/10/18 09:38














- Respiratory


Respiratory status: No respiratory distress


Chest status: Nontender


Breath sounds: Normal


Chest palpation: Normal





- Cardiovascular


Rhythm: Regular


Heart sounds: Normal auscultation





Course





- Vital Signs


Vital signs: 





 











Temp Pulse Resp BP Pulse Ox


 


 97.8 F   63   14   159/78 H  97 


 


 12/10/18 09:38  12/10/18 09:38  12/10/18 09:38  12/10/18 09:38  12/10/18 09:38














Doctor's Discharge





- Discharge


Referrals: 


BISMARK CABALLERO MD [Primary Care Provider] - Follow up as needed

## 2018-12-10 NOTE — EKG REPORT
SEVERITY:- ABNORMAL ECG -

SINUS RHYTHM

ABNORMAL T, CONSIDER ISCHEMIA, LATERAL LEADS

:

Confirmed by: Mica Hyatt MD 10-Dec-2018 11:31:09

## 2018-12-10 NOTE — RADIOLOGY REPORT (SQ)
EXAM DESCRIPTION:  CHEST SINGLE VIEW



COMPLETED DATE/TIME:  12/10/2018 10:26 am



REASON FOR STUDY:  cp



COMPARISON:  1/19/2018



EXAM PARAMETERS:  NUMBER OF VIEWS: One view.

TECHNIQUE: Single frontal radiographic view of the chest acquired.

RADIATION DOSE: NA

LIMITATIONS: None.



FINDINGS:  LUNGS AND PLEURA: No opacities, masses or pneumothorax. No pleural effusion.

MEDIASTINUM AND HILAR STRUCTURES: No masses.  Contour normal.

HEART AND VASCULAR STRUCTURES: Cardiomegaly with left chest multi lead pacer defibrillator and epicar
dial pacing leads.  There are multiple sets of abandoned/fractured pacer leads about the left chest.

BONES: No acute findings.

HARDWARE: None in the chest.

OTHER: No other significant finding.



IMPRESSION:  Cardiomegaly without acute abnormality of the lungs in frontal projection.



TECHNICAL DOCUMENTATION:  JOB ID:  [id number]

 2011 [employer]- All Rights Reserved



Reading location - IP/workstation name: [email or web address]

## 2018-12-24 ENCOUNTER — HOSPITAL ENCOUNTER (EMERGENCY)
Dept: HOSPITAL 62 - ER | Age: 73
LOS: 1 days | Discharge: HOME | End: 2018-12-25
Payer: MEDICARE

## 2018-12-24 VITALS — SYSTOLIC BLOOD PRESSURE: 116 MMHG | DIASTOLIC BLOOD PRESSURE: 61 MMHG

## 2018-12-24 DIAGNOSIS — Z95.810: ICD-10-CM

## 2018-12-24 DIAGNOSIS — I25.10: ICD-10-CM

## 2018-12-24 DIAGNOSIS — J40: Primary | ICD-10-CM

## 2018-12-24 DIAGNOSIS — I11.0: ICD-10-CM

## 2018-12-24 DIAGNOSIS — I50.9: ICD-10-CM

## 2018-12-24 DIAGNOSIS — J44.9: ICD-10-CM

## 2018-12-24 DIAGNOSIS — Z95.5: ICD-10-CM

## 2018-12-24 DIAGNOSIS — Z88.0: ICD-10-CM

## 2018-12-24 DIAGNOSIS — F17.200: ICD-10-CM

## 2018-12-24 DIAGNOSIS — I25.2: ICD-10-CM

## 2018-12-24 LAB
A TYPE INFLUENZA AG: NEGATIVE
ABSOLUTE LYMPHOCYTES# (MANUAL): 0.9 10^3/UL (ref 0.5–4.7)
ABSOLUTE MONOCYTES # (MANUAL): 0.2 10^3/UL (ref 0.1–1.4)
ABSOLUTE NEUTROPHILS# (MANUAL): 4.4 10^3/UL (ref 1.7–8.2)
ADD MANUAL DIFF: YES
ALBUMIN SERPL-MCNC: 4.4 G/DL (ref 3.5–5)
ALP SERPL-CCNC: 92 U/L (ref 38–126)
ALT SERPL-CCNC: 14 U/L (ref 9–52)
ANION GAP SERPL CALC-SCNC: 11 MMOL/L (ref 5–19)
ANISOCYTOSIS BLD QL SMEAR: SLIGHT
AST SERPL-CCNC: 50 U/L (ref 14–36)
B INFLUENZA AG: NEGATIVE
BASOPHILS NFR BLD MANUAL: 1 % (ref 0–2)
BILIRUB DIRECT SERPL-MCNC: 0.4 MG/DL (ref 0–0.4)
BILIRUB SERPL-MCNC: 0.8 MG/DL (ref 0.2–1.3)
BUN SERPL-MCNC: 27 MG/DL (ref 7–20)
CALCIUM: 9.4 MG/DL (ref 8.4–10.2)
CHLORIDE SERPL-SCNC: 101 MMOL/L (ref 98–107)
CO2 SERPL-SCNC: 25 MMOL/L (ref 22–30)
DACRYOCYTES BLD QL SMEAR: SLIGHT
EOSINOPHIL NFR BLD MANUAL: 0 % (ref 0–6)
ERYTHROCYTE [DISTWIDTH] IN BLOOD BY AUTOMATED COUNT: 15.6 % (ref 11.5–14)
GLUCOSE SERPL-MCNC: 113 MG/DL (ref 75–110)
HCT VFR BLD CALC: 37.9 % (ref 36–47)
HGB BLD-MCNC: 12.9 G/DL (ref 12–15.5)
LIPASE SERPL-CCNC: 39.7 U/L (ref 23–300)
MCH RBC QN AUTO: 31.7 PG (ref 27–33.4)
MCHC RBC AUTO-ENTMCNC: 33.9 G/DL (ref 32–36)
MCV RBC AUTO: 94 FL (ref 80–97)
MONOCYTES % (MANUAL): 4 % (ref 3–13)
OVALOCYTES BLD QL SMEAR: SLIGHT
PLATELET # BLD: 227 10^3/UL (ref 150–450)
PLATELET CLUMP BLD QL SMEAR: PRESENT
PLATELET COMMENT: ADEQUATE
POIKILOCYTOSIS BLD QL SMEAR: (no result)
POTASSIUM SERPL-SCNC: 4.1 MMOL/L (ref 3.6–5)
PROT SERPL-MCNC: 7.6 G/DL (ref 6.3–8.2)
RBC # BLD AUTO: 4.06 10^6/UL (ref 3.72–5.28)
SEGMENTED NEUTROPHILS % (MAN): 79 % (ref 42–78)
SODIUM SERPL-SCNC: 137.1 MMOL/L (ref 137–145)
TOTAL CELLS COUNTED BLD: 100
VARIANT LYMPHS NFR BLD MANUAL: 16 % (ref 13–45)
WBC # BLD AUTO: 5.6 10^3/UL (ref 4–10.5)

## 2018-12-24 PROCEDURE — 84484 ASSAY OF TROPONIN QUANT: CPT

## 2018-12-24 PROCEDURE — 94640 AIRWAY INHALATION TREATMENT: CPT

## 2018-12-24 PROCEDURE — 83735 ASSAY OF MAGNESIUM: CPT

## 2018-12-24 PROCEDURE — 36415 COLL VENOUS BLD VENIPUNCTURE: CPT

## 2018-12-24 PROCEDURE — 96365 THER/PROPH/DIAG IV INF INIT: CPT

## 2018-12-24 PROCEDURE — 99284 EMERGENCY DEPT VISIT MOD MDM: CPT

## 2018-12-24 PROCEDURE — 87804 INFLUENZA ASSAY W/OPTIC: CPT

## 2018-12-24 PROCEDURE — 71046 X-RAY EXAM CHEST 2 VIEWS: CPT

## 2018-12-24 PROCEDURE — 93005 ELECTROCARDIOGRAM TRACING: CPT

## 2018-12-24 PROCEDURE — 80053 COMPREHEN METABOLIC PANEL: CPT

## 2018-12-24 PROCEDURE — 96375 TX/PRO/DX INJ NEW DRUG ADDON: CPT

## 2018-12-24 PROCEDURE — 85025 COMPLETE CBC W/AUTO DIFF WBC: CPT

## 2018-12-24 PROCEDURE — 93010 ELECTROCARDIOGRAM REPORT: CPT

## 2018-12-24 PROCEDURE — 83690 ASSAY OF LIPASE: CPT

## 2018-12-24 NOTE — ER DOCUMENT REPORT
ED Medical Screen (RME)





- General


Chief Complaint: Fever


Stated Complaint: COLD SYMPTOMS


Time Seen by Provider: 18 16:39


TRAVEL OUTSIDE OF THE [place] IN LAST 30 DAYS: No





- Related Data


Allergies/Adverse Reactions: 


                                        





Penicillins Allergy (Verified 18 16:36)


   











Past Medical History





- Social History


Chew tobacco use (# tins/day): No





- Past Medical History


Cardiac Medical History: Reports: Hx Congestive Heart Failure, Hx Coronary 

Artery Disease, Hx Heart Attack - , Hx Hypercholesterolemia, Hx Hypertension


   Denies: Hx Atrial Fibrillation, Hx Peripheral Vascular Disease, Hx Pulmonary 

Embolism, Hx Heart Murmur


Pulmonary Medical History: Reports: Hx Bronchitis, Hx COPD, Hx Pneumonia


   Denies: Hx Asthma, Hx Respiratory Failure, Hx Sleep Apnea, Hx Tuberculosis


Neurological Medical History: Denies: Hx Cerebrovascular Accident, Hx Seizures


Endocrine Medical History: Denies: Hx Diabetes Mellitus Type 1, Hx Diabetes 

Mellitus Type 2


Renal/ Medical History: Reports: Hx Renal Insufficiency.  Denies: Hx End Stage

Renal Disease, Hx Kidney Stones, Hx Peritoneal Dialysis


Malignancy Medical History: Denies: Hx Leukemia, Hx Lung Cancer


GI Medical History: Reports: Hx Crohn's Disease - Patient has either Crohn's 

disease or ulcerative colitis, Hx Diverticulitis, Hx Gastroesophageal Reflux 

Disease.  Denies: Hx Hepatitis, Hx Hiatal Hernia, Hx Irritable Bowel, Hx Liver 

Failure, Hx Pancreatitis, Hx Ulcer


Musculoskeltal Medical History: Reports Hx Arthritis, Denies Hx Fibromyalgia, 

Denies Hx Muscular Dystrophy


Psychiatric Medical History: Reports: Hx Depression


   Denies: Hx Bipolar Disorder, Hx Post Traumatic Stress Disorder, Hx 

Schizophrenia


Traumatic Medical History: Reports: Hx Fractures - right wrist, left foot


Infectious Medical History: Denies: Hx Hepatitis, Hx HIV


Past Surgical History: Reports: Hx Appendectomy, Hx Cardiac Catheterization, Hx 

Cardiac Surgery - AICD for ventricular fibrillation, Hx  Section - x4, 

Hx Coronary Stent, Hx Hysterectomy, Hx Orthopedic Surgery - bilateral knee surge

ry, Hx Pacemaker, Hx Tonsillectomy.  Denies: Hx Bowel Surgery, Hx 

Cholecystectomy, Hx Colostomy, Hx Coronary Artery Bypass Graft, Hx Gastric 

Bypass Surgery, Hx Herniorrhaphy, Hx Mastectomy, Hx Open Heart Surgery, Hx Tubal

Ligation





- Immunizations


Immunizations up to date: Yes


Hx Diphtheria, Pertussis, Tetanus Vaccination: Yes


History of Influenza Vaccine for 10/2017 - 3/2018 Season: Yes


Influenza Administration Date for 10/2017 - 3/2018 Season: 17





Physical Exam





- Vital signs


Vitals: 





                                        











Temp Pulse BP Pulse Ox


 


 97.9 F   80   116/61   94 


 


 18 16:17  18 16:17  18 16:17  18 16:17














Course





- Re-evaluation


Re-evalutation: 





18 16:57


73-year-old female with a history of COPD that presents for evaluation of cough 

and shortness of breath as well as some tightness in the chest.


We will initiate workup for this patient and administer neb


I will plan for this patient undergo further investigation evaluation by 

secondary provider, the the appropriate diagnostics, disposition and workup will

be deferred to that provider.  I have performed a rapid screening examination 

and they will require further evaluation.





- Vital Signs


Vital signs: 





                                        











Temp Pulse Resp BP Pulse Ox


 


 97.9 F   80      116/61   94 


 


 18 16:17  18 16:17     18 16:17  18 16:17














Doctor's Discharge





- Discharge


Referrals: 


BISMARK CABALLERO MD [Primary Care Provider] - Follow up as needed

## 2018-12-24 NOTE — RADIOLOGY REPORT (SQ)
EXAM DESCRIPTION:  CHEST 2 VIEWS



COMPLETED DATE/TIME:  12/24/2018 5:17 pm



REASON FOR STUDY:  short of breath productive sputum



COMPARISON:  12/10/2018



EXAM PARAMETERS:  NUMBER OF VIEWS: two views

TECHNIQUE: Digital Frontal and Lateral radiographic views of the chest acquired.

RADIATION DOSE: NA

LIMITATIONS: none



FINDINGS:  LUNGS AND PLEURA: No opacities, masses or pneumothorax. No pleural effusion.

MEDIASTINUM AND HILAR STRUCTURES: No masses or contour abnormalities.

HEART AND VASCULAR STRUCTURES: Cardiomegaly without central vascular congestion.

BONES: No acute findings.

HARDWARE: Stable AICD with previously noted abandoned/fractured leads.

OTHER: No other significant finding.



IMPRESSION:  Stable radiographic appearance of the chest demonstrating cardiomegaly without central v
ascular congestion.



TECHNICAL DOCUMENTATION:  JOB ID:  [id number]

 2011 [employer]- All Rights Reserved



Reading location - IP/workstation name: [email or web address]

## 2018-12-24 NOTE — ER DOCUMENT REPORT
ED General





- General


Chief Complaint: Fever


Stated Complaint: COLD SYMPTOMS


Time Seen by Provider: 18 16:39


TRAVEL OUTSIDE OF THE [place] IN LAST 30 DAYS: No





- HPI


Patient complains to provider of: Cough shortness of breath


Notes: 





Patient coming in for the above-stated symptoms feeling unwell ongoing for the 

last 2-3 days.  Patient states has been compliant with her medications have a 

history of asthma COPD.  Patient states scant sputum production whenever she is 

coughing.  Denies any recent travel or sick contacts.  Patient is unaware of her

fluid status at this time.  Patient states she is feeling slight tightness in 

her chest but states no improvement in her inhalers at home.  Upon my evaluation

patient is resting comfortably





- Related Data


Allergies/Adverse Reactions: 


                                        





Penicillins Allergy (Verified 18 16:36)


   











Past Medical History





- Social History


Smoking Status: Current Every Day Smoker


Chew tobacco use (# tins/day): No


Family History: Reviewed & Not Pertinent, Hypertension


Patient has suicidal ideation: No


Patient has homicidal ideation: No





- Past Medical History


Cardiac Medical History: Reports: Hx Congestive Heart Failure, Hx Coronary 

Artery Disease, Hx Heart Attack - , Hx Hypercholesterolemia, Hx Hypertension


   Denies: Hx Atrial Fibrillation, Hx Peripheral Vascular Disease, Hx Pulmonary 

Embolism, Hx Heart Murmur


Pulmonary Medical History: Reports: Hx Bronchitis, Hx COPD, Hx Pneumonia


   Denies: Hx Asthma, Hx Respiratory Failure, Hx Sleep Apnea, Hx Tuberculosis


Neurological Medical History: Denies: Hx Cerebrovascular Accident, Hx Seizures


Endocrine Medical History: Denies: Hx Diabetes Mellitus Type 1, Hx Diabetes 

Mellitus Type 2


Renal/ Medical History: Reports: Hx Renal Insufficiency.  Denies: Hx End Stage

Renal Disease, Hx Kidney Stones, Hx Peritoneal Dialysis


Malignancy Medical History: Denies: Hx Leukemia, Hx Lung Cancer


GI Medical History: Reports: Hx Crohn's Disease - Patient has either Crohn's 

disease or ulcerative colitis, Hx Diverticulitis, Hx Gastroesophageal Reflux 

Disease.  Denies: Hx Hepatitis, Hx Hiatal Hernia, Hx Irritable Bowel, Hx Liver 

Failure, Hx Pancreatitis, Hx Ulcer


Musculoskeletal Medical History: Reports Hx Arthritis, Denies Hx Fibromyalgia, 

Denies Hx Muscular Dystrophy


Psychiatric Medical History: Reports: Hx Depression


   Denies: Hx Bipolar Disorder, Hx Post Traumatic Stress Disorder, Hx 

Schizophrenia


Traumatic Medical History: Reports: Hx Fractures - right wrist, left foot


Infectious Medical History: Denies: Hx Hepatitis, Hx HIV


Past Surgical History: Reports: Hx Appendectomy, Hx Cardiac Catheterization, Hx 

Cardiac Surgery - AICD for ventricular fibrillation, Hx  Section - x4, 

Hx Coronary Stent, Hx Hysterectomy, Hx Orthopedic Surgery - bilateral knee surge

ry, Hx Pacemaker, Hx Tonsillectomy.  Denies: Hx Bowel Surgery, Hx 

Cholecystectomy, Hx Colostomy, Hx Coronary Artery Bypass Graft, Hx Gastric 

Bypass Surgery, Hx Herniorrhaphy, Hx Mastectomy, Hx Open Heart Surgery, Hx Tubal

Ligation





- Immunizations


Immunizations up to date: Yes


Hx Diphtheria, Pertussis, Tetanus Vaccination: Yes


Hx Pneumococcal Vaccination: 01/01/10





Review of Systems





- Review of Systems


Constitutional: No symptoms reported


EENT: No symptoms reported


Cardiovascular: No symptoms reported


Respiratory: Short of breath, Wheezing


Gastrointestinal: No symptoms reported


Genitourinary: No symptoms reported


Female Genitourinary: No symptoms reported


Musculoskeletal: No symptoms reported


Skin: No symptoms reported


Hematologic/Lymphatic: No symptoms reported


Neurological/Psychological: No symptoms reported


-: Yes All other systems reviewed and negative





Physical Exam





- Vital signs


Vitals: 


                                        











Temp Pulse BP Pulse Ox


 


 97.9 F   80   116/61   94 


 


 18 16:17  18 16:17  18 16:17  18 16:17











Interpretation: Normal





- General


General appearance: Appears well, Alert





- HEENT


Head: Normocephalic, Atraumatic


Eyes: Normal


Pupils: PERRL





- Respiratory


Respiratory status: No respiratory distress


Chest status: Nontender


Breath sounds: Wheezing


Chest palpation: Normal





- Cardiovascular


Rhythm: Regular


Heart sounds: Normal auscultation


Murmur: No





- Abdominal


Inspection: Normal


Distension: No distension


Bowel sounds: Normal


Tenderness: Nontender


Organomegaly: No organomegaly





- Back


Back: Normal, Nontender





- Extremities


General upper extremity: Normal inspection, Nontender, Normal color, Normal ROM,

Normal temperature


General lower extremity: Normal inspection, Nontender, Normal color, Normal ROM,

Normal temperature, Normal weight bearing.  No: Jorge's sign





- Neurological


Neuro grossly intact: Yes


Cognition: Normal


Orientation: AAOx4


Vernon Coma Scale Eye Opening: Spontaneous


Vernon Coma Scale Verbal: Oriented


Olvin Coma Scale Motor: Obeys Commands


Vernon Coma Scale Total: 15


Speech: Normal


Motor strength normal: LUE, RUE, LLE, RLE


Sensory: Normal





- Psychological


Associated symptoms: Normal affect, Normal mood





- Skin


Skin Temperature: Warm


Skin Moisture: Dry


Skin Color: Normal





Course





- Re-evaluation


Re-evalutation: 





18 19:45


Laboratory studies showed a slight increase in the patient's creatinine will 

give the patient 500 cc bolus.  Patient also has coarse wheezing on examination 

will give another breathing treatment magnesium and start steroids.  EKG was 

reviewed showing diffuse T wave inversions which are present and previous EKG we

will continue to monitor the patient.


18 01:40


Troponins negative x2 patient was given multiple breathing treatments with 

improvement of her wheezing here in the ER.  Patient states feeling much better 

wished to go home patient was able to ambulate around the nurses station with no

signs of difficulty even after ambulation patient agrees that she is feeling 

better and wishes the patient has underlying bronchitis viral versus 

environmental.  Patient will be given prednisone for the next few days recommend

follow-up with her PCP.  Patient states understanding





- Vital Signs


Vital signs: 


                                        











Temp Pulse Resp BP Pulse Ox


 


 97.9 F   80   19   116/61   94 


 


 18 16:17  18 16:17  18 00:00  18 16:17  18 00:00














- Laboratory


Result Diagrams: 


                                 18 17:50





                                 18 17:50


Laboratory results interpreted by me: 


                                        











  18





  17:50 17:50


 


RDW  15.6 H 


 


Seg Neuts % (Manual)  79 H 


 


BUN   27 H


 


Creatinine   1.54 H


 


Est GFR ( Amer)   40 L


 


Est GFR (Non-Af Amer)   33 L


 


Glucose   113 H


 


AST   50 H














Discharge





- Discharge


Clinical Impression: 


 Bronchitis





Condition: Good


Disposition: HOME, SELF-CARE


Instructions:  Bronchitis With Bronchospasm (Wheezing) (Formerly [facility], later [facility])


Additional Instructions: 


Your evaluation does not reveal any signs of pneumonia or other infectious 

etiology your cardiac enzymes are negative I do believe you have a viral 

bronchitis are highly recommend that she use your inhalers especially your 

albuterol inhaler 2 puffs every 4 hours for the next for 5 days I would 

recommend taking the steroids as prescribed follow-up with Dr. Caballero in the next

24-48 hours return to ER symptoms worsen.


Prescriptions: 


Prednisone [Deltasone 20 mg Tablet] 2 tab PO DAILY 2 Days  tablet


Referrals: 


BISMARK CABALLERO MD [Primary Care Provider] - Follow up as needed

## 2018-12-24 NOTE — EKG REPORT
SEVERITY:- ABNORMAL ECG -

SINUS RHYTHM

REPOL ABNRM SUGGESTS ISCHEMIA, ANT-LAT LEADS  vs LVH

:

Confirmed by: Lety Pablo 24-Dec-2018 23:21:28

## 2018-12-28 ENCOUNTER — HOSPITAL ENCOUNTER (EMERGENCY)
Dept: HOSPITAL 62 - ER | Age: 73
Discharge: HOME | End: 2018-12-28
Payer: MEDICARE

## 2018-12-28 VITALS — SYSTOLIC BLOOD PRESSURE: 118 MMHG | DIASTOLIC BLOOD PRESSURE: 66 MMHG

## 2018-12-28 DIAGNOSIS — I11.0: ICD-10-CM

## 2018-12-28 DIAGNOSIS — I25.10: ICD-10-CM

## 2018-12-28 DIAGNOSIS — E78.00: ICD-10-CM

## 2018-12-28 DIAGNOSIS — J44.9: ICD-10-CM

## 2018-12-28 DIAGNOSIS — Z88.0: ICD-10-CM
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
## 2018-12-28 DIAGNOSIS — Z95.810: ICD-10-CM

## 2018-12-28 DIAGNOSIS — Z90.710: ICD-10-CM

## 2018-12-28 DIAGNOSIS — I50.9: ICD-10-CM
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
## 2018-12-28 DIAGNOSIS — S62.637A: Primary | ICD-10-CM

## 2018-12-28 DIAGNOSIS — I25.2: ICD-10-CM

## 2018-12-28 PROCEDURE — 99283 EMERGENCY DEPT VISIT LOW MDM: CPT

## 2018-12-28 NOTE — RADIOLOGY REPORT (SQ)
EXAM DESCRIPTION:  FINGER LEFT



COMPLETED DATE/TIME:  12/28/2018 2:54 pm



REASON FOR STUDY:  6; s/p reduction



COMPARISON:  Earlier same day.



NUMBER OF VIEWS:  Three views.



TECHNIQUE:  AP, lateral, and oblique images acquired of the left fifth finger.



LIMITATIONS:  None.



FINDINGS:  Near anatomic alignment of fracture proximal shaft proximal 5th phalanx.



IMPRESSION:  Successful closed reduction.



TECHNICAL DOCUMENTATION:  JOB ID:  [id number]

 2011 [email or web address]- All Rights Reserved



Reading location - IP/workstation name: [email or web address]

## 2018-12-28 NOTE — RADIOLOGY REPORT (SQ)
EXAM DESCRIPTION:  HAND LEFT 3 VIEWS



COMPLETED DATE/TIME:  12/28/2018 2:04 pm



REASON FOR STUDY:  Fell and jammed his finger



COMPARISON:  None.



EXAM PARAMETERS:  NUMBER OF VIEWS: Three views.

TECHNIQUE: AP, lateral and oblique  radiographic images acquired of the left hand.

LIMITATIONS: None.



FINDINGS:  MINERALIZATION: Osteopenia.

BONES: Transverse fracture of the proximal 5th phalanx proximal shaft with about 45 radial angulatio
n.  No other fracture identified.

JOINTS: No effusions.

SOFT TISSUES: No foreign body.

OTHER: No other significant finding.



IMPRESSION:  Fracture proximal 5th phalanx.



TECHNICAL DOCUMENTATION:  JOB ID:  [id number]

 2011 [employer]- All Rights Reserved



Reading location - IP/workstation name: [email or web address]

## 2018-12-28 NOTE — ER DOCUMENT REPORT
ED Hand/Wrist Injury





- General


Chief Complaint: Finger Injury


Stated Complaint: FALL/HAND INJURY


Time Seen by Provider: 18 13:16


Information source: Patient


Notes: 





Patient is a 73-year-old female with an accidental fall walking up the steps 

injuring her left pinky.  Patient denies any lightheadedness, dizziness, or 

chest pain causing the fall.  She states she has pain only to her left pinky 

finger.  She denies hitting her head and denies any neck, lower back, pelvis 

pain at this time.


TRAVEL OUTSIDE OF THE [place] IN LAST 30 DAYS: No





- HPI


Injury to: Small finger


Onset: Just prior to arrival


Where: Home


Timing: Constant


Quality of pain: Achy


Severity: Mild


Pain Level: Denies


Context: Other - See above





- Related Data


Allergies/Adverse Reactions: 


                                        





Penicillins Allergy (Verified 18 12:56)


   











Past Medical History





- Social History


Smoking Status: Former Smoker


Frequency of alcohol use: None


Drug Abuse: None


Family History: Reviewed & Not Pertinent, Hypertension


Patient has suicidal ideation: No


Patient has homicidal ideation: No





- Past Medical History


Cardiac Medical History: Reports: Hx Congestive Heart Failure, Hx Coronary 

Artery Disease, Hx Heart Attack - , Hx Hypercholesterolemia, Hx Hypertension


   Denies: Hx Atrial Fibrillation, Hx Peripheral Vascular Disease, Hx Pulmonary 

Embolism, Hx Heart Murmur


Pulmonary Medical History: Reports: Hx Bronchitis, Hx COPD, Hx Pneumonia


   Denies: Hx Asthma, Hx Respiratory Failure, Hx Sleep Apnea, Hx Tuberculosis


Neurological Medical History: Denies: Hx Cerebrovascular Accident, Hx Seizures


Endocrine Medical History: Denies: Hx Diabetes Mellitus Type 1, Hx Diabetes 

Mellitus Type 2


Renal/ Medical History: Reports: Hx Renal Insufficiency.  Denies: Hx End Stage

Renal Disease, Hx Kidney Stones, Hx Peritoneal Dialysis


Malignancy Medical History: Denies: Hx Leukemia, Hx Lung Cancer


GI Medical History: Reports: Hx Crohn's Disease - Patient has either Crohn's 

disease or ulcerative colitis, Hx Diverticulitis, Hx Gastroesophageal Reflux 

Disease.  Denies: Hx Hepatitis, Hx Hiatal Hernia, Hx Irritable Bowel, Hx Liver 

Failure, Hx Pancreatitis, Hx Ulcer


Musculoskeletal Medical History: Reports Hx Arthritis, Denies Hx Fibromyalgia, 

Denies Hx Muscular Dystrophy


Psychiatric Medical History: Reports: Hx Depression


   Denies: Hx Bipolar Disorder, Hx Post Traumatic Stress Disorder, Hx 

Schizophrenia


Traumatic Medical History: Reports: Hx Fractures - right wrist, left foot


Infectious Medical History: Denies: Hx Hepatitis, Hx HIV


Past Surgical History: Reports: Hx Appendectomy, Hx Cardiac Catheterization, Hx 

Cardiac Surgery - AICD for ventricular fibrillation, Hx  Section - x4, 

Hx Coronary Stent, Hx Hysterectomy, Hx Orthopedic Surgery - bilateral knee 

surgery, Hx Pacemaker, Hx Tonsillectomy.  Denies: Hx Bowel Surgery, Hx 

Cholecystectomy, Hx Colostomy, Hx Coronary Artery Bypass Graft, Hx Gastric 

Bypass Surgery, Hx Herniorrhaphy, Hx Mastectomy, Hx Open Heart Surgery, Hx Tubal

Ligation





- Immunizations


Immunizations up to date: Yes


Hx Diphtheria, Pertussis, Tetanus Vaccination: Yes


Hx Pneumococcal Vaccination: 01/01/10





Review of Systems





- Review of Systems


Constitutional: denies: Fever


EENT: denies: Eye discharge, Nose discharge


Cardiovascular: denies: Chest pain, Palpitations


Respiratory: denies: Short of breath


Gastrointestinal: denies: Abdominal pain, Vomiting


Musculoskeletal: denies: Leg swelling


Skin: denies: Rash


Neurological/Psychological: Other - no slurred speech


-: Yes All other systems reviewed and negative





Physical Exam





- Vital signs


Vitals: 


                                        











Temp Pulse Resp BP Pulse Ox


 


 97.8 F   65   16   120/62   94 


 


 18 13:01  18 13:01  18 13:01  18 13:01  18 13:01











Interpretation: Normal


Notes: 





Reviewed vital signs and nursing note as charted by RN.





CONSTITUTIONAL: Alert and oriented and responds appropriately to questions. 

Well-appearing; well-nourished





HEAD: Normocephalic; atraumatic





EYES: PERRL





ENT: Normal nose; no rhinorrhea; moist mucous membranes; pharynx without lesions

noted





NECK: Supple without meningismus; non-tender; no cervical lymphadenopathy, no 

masses





CARD: Regular rate and rhythm; no murmurs; symmetric distal pulses





RESP: Normal chest excursion without splinting or tachypnea; breath sounds clear

and equal bilaterally; no tenderness to the anterior posterior ribs





ABD/GI: Normal bowel sounds; non-distended; soft, non-tender





BACK: The back appears normal and is non-tender to palpation





EXT: Patient has an obvious deformity to the left pinky finger with some 

swelling to the proximal right pinky digit.  Patient has some lateral abduction 

of the pinky finger consistent with a fracture and/or dislocation





SKIN: No acute lesions noted





NEURO: Patient has full range of motion of all 4 extremities with excellent 

strength.  Neurovascularly intact to the distal pinky tip with capillary refill 

and sensation intact to light touch





PSYCH: The patient's mood and manner are appropriate. Grooming and personal 

hygiene are appropriate.





Course





- Re-evaluation


Re-evalutation: 





We will perform an x-ray of the left hand.  I do not believe any other imaging 

or laboratory work is necessary at this moment.








18 14:08


Given the above x-ray as well as the examination findings, I will perform a 

digital block and then perform a fracture dislocation reduction and have the 

tech perform the splinting.





18 14:53


Portable x-ray shows good alignment and reduction of the fracture dislocation.  

We will place the patient in a splint and provide final imaging and most likely 

discharge the patient home with strict return precautions and follow-up with the

orthopedic surgeon.





18 16:27


Finger relocation looks good.  Patient will be discharged home with orthopedic 

follow-up.





- Vital Signs


Vital signs: 


                                        











Temp Pulse Resp BP Pulse Ox


 


 97.8 F   65   16   120/62   94 


 


 18 13:01  18 13:01  18 13:01  18 13:01  18 13:01














Procedures





- Joint Reduction/Fracture Care


  ** Left 5th digit


Consent obtained: Yes


Conscious sedation: No


Pre-procedure NV exam: Yes


Fracture: Closed


Manipulation comment: Traction with countertraction at the base of the fracture 

site


Post-procedure NV exam: Yes


Post-reduction x-ray: Joint reduced


Reduction attempts: 1


Complications: No





Discharge





- Discharge


Clinical Impression: 


Closed fracture of distal phalanx of left little finger


Qualifiers:


 Encounter type: initial encounter Fracture alignment: displaced Qualified 

Code(s): S62.637A - Displaced fracture of distal phalanx of left little finger, 

initial encounter for closed fracture





Condition: Good


Disposition: HOME, SELF-CARE


Additional Instructions: 


Come back immediately for any increased pain, weakness or discoloration to the 

fingertip, or any other acute problems.  Please make sure that you follow-up 

with orthopedics as we have discussed.


Prescriptions: 


Hydrocodone/Acetaminophen [Norco 5-325 mg Tablet] 1 tab PO Q6H #12 tablet


Referrals: 


BISMARK CABALLERO MD [Primary Care Provider] - Follow up as needed


ENEDINA LARA MD [ACTIVE STAFF] - Follow up as needed

## 2018-12-28 NOTE — ER DOCUMENT REPORT
ED Medical Screen (RME)





- General


Chief Complaint: Finger Injury


Stated Complaint: FALL/HAND INJURY


Time Seen by Provider: 18 13:16


Notes: 





Fell and jammed left hand.  Obvious deformity with dislocation of the fifth 

finger at the MP joint.





TRAVEL OUTSIDE OF THE [place] IN LAST 30 DAYS: No





- Related Data


Allergies/Adverse Reactions: 


                                        





Penicillins Allergy (Verified 18 12:56)


   











Past Medical History





- Past Medical History


Cardiac Medical History: Reports: Hx Congestive Heart Failure, Hx Coronary Tiffany

ry Disease, Hx Heart Attack - , Hx Hypercholesterolemia, Hx Hypertension


   Denies: Hx Atrial Fibrillation, Hx Peripheral Vascular Disease, Hx Pulmonary 

Embolism, Hx Heart Murmur


Pulmonary Medical History: Reports: Hx Bronchitis, Hx COPD, Hx Pneumonia


   Denies: Hx Asthma, Hx Respiratory Failure, Hx Sleep Apnea, Hx Tuberculosis


Neurological Medical History: Denies: Hx Cerebrovascular Accident, Hx Seizures


Endocrine Medical History: Denies: Hx Diabetes Mellitus Type 1, Hx Diabetes 

Mellitus Type 2


Renal/ Medical History: Reports: Hx Renal Insufficiency.  Denies: Hx End Stage

Renal Disease, Hx Kidney Stones, Hx Peritoneal Dialysis


Malignancy Medical History: Denies: Hx Leukemia, Hx Lung Cancer


GI Medical History: Reports: Hx Crohn's Disease - Patient has either Crohn's 

disease or ulcerative colitis, Hx Diverticulitis, Hx Gastroesophageal Reflux 

Disease.  Denies: Hx Hepatitis, Hx Hiatal Hernia, Hx Irritable Bowel, Hx Liver 

Failure, Hx Pancreatitis, Hx Ulcer


Musculoskeltal Medical History: Reports Hx Arthritis, Denies Hx Fibromyalgia, 

Denies Hx Muscular Dystrophy


Psychiatric Medical History: Reports: Hx Depression


   Denies: Hx Bipolar Disorder, Hx Post Traumatic Stress Disorder, Hx Shamika

izophrenia


Traumatic Medical History: Reports: Hx Fractures - right wrist, left foot


Infectious Medical History: Denies: Hx Hepatitis, Hx HIV


Past Surgical History: Reports: Hx Appendectomy, Hx Cardiac Catheterization, Hx 

Cardiac Surgery - AICD for ventricular fibrillation, Hx  Section - x4, 

Hx Coronary Stent, Hx Hysterectomy, Hx Orthopedic Surgery - bilateral knee 

surgery, Hx Pacemaker, Hx Tonsillectomy.  Denies: Hx Bowel Surgery, Hx 

Cholecystectomy, Hx Colostomy, Hx Coronary Artery Bypass Graft, Hx Gastric Byp

ass Surgery, Hx Herniorrhaphy, Hx Mastectomy, Hx Open Heart Surgery, Hx Tubal 

Ligation





- Immunizations


Immunizations up to date: Yes


Hx Diphtheria, Pertussis, Tetanus Vaccination: Yes


History of Influenza Vaccine for 10/2017 - 3/2018 Season: Yes


Influenza Administration Date for 10/2017 - 3/2018 Season: 17





Physical Exam





- Vital signs


Vitals: 





                                        











Temp Pulse Resp BP Pulse Ox


 


 97.8 F   65   16   120/62   94 


 


 18 13:01  18 13:01  18 13:01  18 13:01  18 13:01














Course





- Vital Signs


Vital signs: 





                                        











Temp Pulse Resp BP Pulse Ox


 


 97.8 F   65   16   120/62   94 


 


 18 13:01  18 13:01  18 13:01  18 13:01  18 13:01














Doctor's Discharge





- Discharge


Referrals: 


BISMARK CABALLERO MD [Primary Care Provider] - Follow up as needed

## 2018-12-28 NOTE — RADIOLOGY REPORT (SQ)
EXAM DESCRIPTION:  FINGER LEFT



COMPLETED DATE/TIME:  12/28/2018 4:31 pm



REASON FOR STUDY:  4; s/p splinting



COMPARISON:  Same day radiographs



NUMBER OF VIEWS:  Three views.



TECHNIQUE:  AP, lateral, and oblique images acquired of the left 5th digit



LIMITATIONS:  None.



FINDINGS:  MINERALIZATION: Normal.

BONES: Interval placement of spica type splint about the left hand.  Near anatomic alignment of previ
ously seen transverse fracture of the proximal left 5th phalanx.

SOFT TISSUES: No soft tissue swelling.  No foreign body.

OTHER: No other significant finding.



IMPRESSION:  Interval placement of spica type splint about the left hand.  Near anatomic alignment of
 previously seen transverse fracture of the proximal left 5th phalanx.



TECHNICAL DOCUMENTATION:  JOB ID:  [id number]

 2011 [employer]- All Rights Reserved



Reading location - IP/workstation name: [email or web address]

## 2019-01-08 ENCOUNTER — HOSPITAL ENCOUNTER (OUTPATIENT)
Dept: HOSPITAL 62 - OD | Age: 74
End: 2019-01-08
Attending: PHYSICIAN ASSISTANT
Payer: MEDICARE

## 2019-01-08 DIAGNOSIS — M25.551: Primary | ICD-10-CM

## 2019-01-08 DIAGNOSIS — R05: ICD-10-CM

## 2019-01-08 LAB
ADD MANUAL DIFF: NO
ALBUMIN SERPL-MCNC: 4 G/DL (ref 3.5–5)
ALP SERPL-CCNC: 99 U/L (ref 38–126)
ALT SERPL-CCNC: 23 U/L (ref 9–52)
ANION GAP SERPL CALC-SCNC: 11 MMOL/L (ref 5–19)
AST SERPL-CCNC: 16 U/L (ref 14–36)
BASOPHILS # BLD AUTO: 0 10^3/UL (ref 0–0.2)
BASOPHILS NFR BLD AUTO: 0.1 % (ref 0–2)
BILIRUB DIRECT SERPL-MCNC: 0.3 MG/DL (ref 0–0.4)
BILIRUB SERPL-MCNC: 0.9 MG/DL (ref 0.2–1.3)
BUN SERPL-MCNC: 23 MG/DL (ref 7–20)
CALCIUM: 9.6 MG/DL (ref 8.4–10.2)
CHLORIDE SERPL-SCNC: 106 MMOL/L (ref 98–107)
CO2 SERPL-SCNC: 22 MMOL/L (ref 22–30)
EOSINOPHIL # BLD AUTO: 0 10^3/UL (ref 0–0.6)
EOSINOPHIL NFR BLD AUTO: 0 % (ref 0–6)
ERYTHROCYTE [DISTWIDTH] IN BLOOD BY AUTOMATED COUNT: 14.9 % (ref 11.5–14)
GLUCOSE SERPL-MCNC: 106 MG/DL (ref 75–110)
HCT VFR BLD CALC: 33.4 % (ref 36–47)
HGB BLD-MCNC: 11.2 G/DL (ref 12–15.5)
LYMPHOCYTES # BLD AUTO: 0.6 10^3/UL (ref 0.5–4.7)
LYMPHOCYTES NFR BLD AUTO: 15.1 % (ref 13–45)
MCH RBC QN AUTO: 31.3 PG (ref 27–33.4)
MCHC RBC AUTO-ENTMCNC: 33.6 G/DL (ref 32–36)
MCV RBC AUTO: 93 FL (ref 80–97)
MONOCYTES # BLD AUTO: 0.1 10^3/UL (ref 0.1–1.4)
MONOCYTES NFR BLD AUTO: 3.2 % (ref 3–13)
NEUTROPHILS # BLD AUTO: 3.5 10^3/UL (ref 1.7–8.2)
NEUTS SEG NFR BLD AUTO: 81.6 % (ref 42–78)
PLATELET # BLD: 263 10^3/UL (ref 150–450)
POTASSIUM SERPL-SCNC: 4.3 MMOL/L (ref 3.6–5)
PROT SERPL-MCNC: 6.6 G/DL (ref 6.3–8.2)
RBC # BLD AUTO: 3.59 10^6/UL (ref 3.72–5.28)
SODIUM SERPL-SCNC: 139.4 MMOL/L (ref 137–145)
TOTAL CELLS COUNTED % (AUTO): 100 %
WBC # BLD AUTO: 4.3 10^3/UL (ref 4–10.5)

## 2019-01-08 PROCEDURE — 36415 COLL VENOUS BLD VENIPUNCTURE: CPT

## 2019-01-08 PROCEDURE — 80053 COMPREHEN METABOLIC PANEL: CPT

## 2019-01-08 PROCEDURE — 85025 COMPLETE CBC W/AUTO DIFF WBC: CPT

## 2019-01-08 NOTE — RADIOLOGY REPORT (SQ)
EXAM DESCRIPTION:  HIP RIGHT AP/LATERAL



COMPLETED DATE/TIME:  1/8/2019 4:11 pm



REASON FOR STUDY:  PAIN IN RT HIP R05  COUGH M25.551  PAIN IN RIGHT HIP



COMPARISON:  None.



NUMBER OF VIEWS:  Two views.



TECHNIQUE:  AP pelvis and additional frog-leg view of the right hip.



LIMITATIONS:  None.



FINDINGS:  MINERALIZATION: Normal.

PRIMARY HIP: No fracture or dislocation.  No worrisome bone lesions.

OPPOSITE HIP: No fracture or dislocation.  No worrisome bone lesions.

PUBIS AND ISCHIUM: No fracture.

PELVIS: No fracture.

SACRUM: No fracture or dislocation. No worrisome bone lesions.

LOWER LUMBAR SPINE: No fracture or dislocation. No worrisome bone lesions.  No significant disc disea
se.

SOFT TISSUES: No findings.

OTHER: No other significant finding.



IMPRESSION:  NEGATIVE STUDY OF THE RIGHT HIP AND PELVIS. NO ACUTE POST-TRAUMATIC CHANGES. NO EXPLANAT
ION FOR PAIN.



TECHNICAL DOCUMENTATION:  JOB ID:  [id number]

 2011 [email or web address]- All Rights Reserved



Reading location - IP/workstation name: [email or web address]

## 2019-01-31 LAB
APPEARANCE UR: CLEAR
APTT PPP: YELLOW S
BILIRUB UR QL STRIP: NEGATIVE
GLUCOSE UR STRIP-MCNC: NEGATIVE MG/DL
KETONES UR STRIP-MCNC: NEGATIVE MG/DL
NITRITE UR QL STRIP: NEGATIVE
PH UR STRIP: 5 [PH] (ref 5–9)
PROT UR STRIP-MCNC: NEGATIVE MG/DL
SP GR UR STRIP: 1.01
UROBILINOGEN UR-MCNC: NEGATIVE MG/DL (ref ?–2)

## 2019-01-31 NOTE — EKG REPORT
SEVERITY:- ABNORMAL ECG -

SINUS RHYTHM

ABNORMAL T, CONSIDER ISCHEMIA, ANT-LAT LEADS

:

Confirmed by: Salbador Benjamin MD 31-Jan-2019 13:14:56

## 2019-01-31 NOTE — RADIOLOGY REPORT (SQ)
EXAM DESCRIPTION:  CHEST PA/LATERAL



COMPLETED DATE/TIME:  1/31/2019 10:44 am



REASON FOR STUDY:  PRE-OP



COMPARISON:  12/24/2018 two-view chest

CT chest 7/20/2018



EXAM PARAMETERS:  NUMBER OF VIEWS: two views

TECHNIQUE: Digital Frontal and Lateral radiographic views of the chest acquired.

RADIATION DOSE: NA

LIMITATIONS: none



FINDINGS:  LUNGS AND PLEURA: 1.5 cm nodule in the left upper lobe marked with a [place].  This is larg
er than on chest CT 7/20/2018.

Remainder of the lungs are grossly clear.  No pleural effusion.  No pneumothorax.

MEDIASTINUM AND HILAR STRUCTURES: No masses or contour abnormalities.

HEART AND VASCULAR STRUCTURES: Moderate cardiomegaly with old pacemaker/defibrillator

BONES: No acute findings.

HARDWARE: None in the chest.

OTHER: No other significant finding.



IMPRESSION:  1.5 cm left upper lobe lung parenchymal nodule



TECHNICAL DOCUMENTATION:  JOB ID:  [id number]

 2011 [employer]- All Rights Reserved



Reading location - IP/workstation name: [email or web address]

## 2019-02-01 ENCOUNTER — HOSPITAL ENCOUNTER (OUTPATIENT)
Dept: HOSPITAL 62 - OROUT | Age: 74
Discharge: HOME | End: 2019-02-01
Attending: ORTHOPAEDIC SURGERY
Payer: MEDICARE

## 2019-02-01 VITALS — DIASTOLIC BLOOD PRESSURE: 72 MMHG | SYSTOLIC BLOOD PRESSURE: 138 MMHG

## 2019-02-01 DIAGNOSIS — S69.92XD: ICD-10-CM

## 2019-02-01 DIAGNOSIS — R06.02: ICD-10-CM

## 2019-02-01 DIAGNOSIS — I25.10: ICD-10-CM

## 2019-02-01 DIAGNOSIS — I73.9: ICD-10-CM

## 2019-02-01 DIAGNOSIS — I10: ICD-10-CM

## 2019-02-01 DIAGNOSIS — S62.616D: Primary | ICD-10-CM

## 2019-02-01 DIAGNOSIS — Z79.899: ICD-10-CM
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
## 2019-02-01 DIAGNOSIS — K21.9: ICD-10-CM

## 2019-02-01 DIAGNOSIS — Z01.818: ICD-10-CM

## 2019-02-01 DIAGNOSIS — F17.210: ICD-10-CM

## 2019-02-01 DIAGNOSIS — Z95.0: ICD-10-CM

## 2019-02-01 DIAGNOSIS — R26.81: ICD-10-CM

## 2019-02-01 DIAGNOSIS — J44.9: ICD-10-CM

## 2019-02-01 DIAGNOSIS — Z79.51: ICD-10-CM

## 2019-02-01 DIAGNOSIS — Z88.0: ICD-10-CM

## 2019-02-01 DIAGNOSIS — Z91.81: ICD-10-CM

## 2019-02-01 DIAGNOSIS — E78.5: ICD-10-CM

## 2019-02-01 DIAGNOSIS — I25.2: ICD-10-CM

## 2019-02-01 LAB
APTT BLD: 28.6 SEC (ref 23.5–35.8)
INR PPP: 0.94
PROTHROMBIN TIME: 13.1 SEC (ref 11.4–15.4)

## 2019-02-01 PROCEDURE — 01820 ANES CLSD PX RDS U/W/H BONES: CPT

## 2019-02-01 PROCEDURE — 85730 THROMBOPLASTIN TIME PARTIAL: CPT

## 2019-02-01 PROCEDURE — C1713 ANCHOR/SCREW BN/BN,TIS/BN: HCPCS

## 2019-02-01 PROCEDURE — 26727 TREAT FINGER FRACTURE EACH: CPT

## 2019-02-01 PROCEDURE — 93010 ELECTROCARDIOGRAM REPORT: CPT

## 2019-02-01 PROCEDURE — 94640 AIRWAY INHALATION TREATMENT: CPT

## 2019-02-01 PROCEDURE — 36415 COLL VENOUS BLD VENIPUNCTURE: CPT

## 2019-02-01 PROCEDURE — 81001 URINALYSIS AUTO W/SCOPE: CPT

## 2019-02-01 PROCEDURE — 93005 ELECTROCARDIOGRAM TRACING: CPT

## 2019-02-01 PROCEDURE — 71046 X-RAY EXAM CHEST 2 VIEWS: CPT

## 2019-02-01 PROCEDURE — 73140 X-RAY EXAM OF FINGER(S): CPT

## 2019-02-01 PROCEDURE — 85610 PROTHROMBIN TIME: CPT

## 2019-02-01 NOTE — DISCHARGE SUMMARY
Discharge Summary (SDC)





- Discharge


Final Diagnosis: 





Left small finger proximal phalanx fracture


Date of Surgery: 02/01/19


Discharge Date: 02/01/19


Condition: Good


Forms:  [facility] Anesthesia D/C Instruction, Discharge POC-Surgical Service


Treatment or Instructions: 











Schedule Follow Up w/ Dr. Nasir Collins @ [facility] for Surgery to be seen 

in 10-14 days or as scheduled


[place]: [phone] 


[place]: [phone]


[place]: [phone] 





Ice and elevate





Keep splint clean/dry/intact.





If your fingers become numb please unwrap the Ace wrap but leave the splint in 

place, if the sensation does not return within 30 minutes please return to the 

emergency department.





May begin finger range of motion attempting to make full fist.





Please use ibuprofen (Motrin or Advil) 600-800 mg every 8 hours as needed for 

pain or fever DO NOT TAKE w/ TORADOL may use once TORADOL complete.  You may 

also use acetaminophen (Tylenol) 1000 mg every 4-6 hours as needed for pain or 

fever.  Please be aware that many medications contain acetaminophen, do not 

exceed a total of 1000 mg of acetaminophen every 6 hours.  





If ibuprofen and acetaminophen are not sufficient for your pain you may take the

Percocet/Norco.  Please be aware that the Percocet/Norco does contain Tylenol.





Stool softener of choice when on pain medication.





USE OF OVER-THE-COUNTER IBUPROFEN:


     Ibuprofen (Advil, Nuprin, Medipren, Motrin IB) is a medication for fever 

and pain control.  In addition, it has anti- inflammatory effects which may be 

beneficial, especially in the treatment of injuries.


     It's best to take ibuprofen with food.  Persons with ulcer disease or 

allergy to aspirin should notify their physician of this before taking 

ibuprofen.


     Ibuprofen can be given every four to six hours, for a total of four doses 

daily.


     Age              Pain or fever dose          Antiinflammatory dose


     6-8 yr              200 mg (1 tab)                200 mg (1 tab)


     9-11 yr             200 mg (1 tab)                200-400 mg (1-2 tab)


     11-14 yr            200-400 mg (1-2 tab)         400 mg (2 tab)


     15-adult            400 mg (2 tab)                600 mg (3 tab)








ORAL NARCOTIC MEDICATION:


     You have been given a prescription for pain control.  This medication is a 

narcotic.  It's best taken with food, as nausea can result if taken on an empty 

stomach.


     Don't operate machinery or drive within six hours of taking this 

medication.  Do not combine this medicine with alcohol, or with any medication 

which can cause sedation (such as cold tablets or sleeping pills) unless you get

permission from the physician.


     Narcotics tend to cause constipation.  If possible, drink plenty of fluids 

and eat a diet high in fiber and fruits.





     Please be aware that prescription narcotics also have the potential for 

abuse.  People become addicted to these medications because of the general sense

of wellbeing that they induce.  This feeling along with a significant reduction 

in tension, anxiety, and aggression provides a stimulating seductive quality to 

these drugs.  Once your pain is under control, we encourage you to discard your 

unused narcotics.





Prescriptions: 


Hydrocodone/Acetaminophen [Norco 5-325 mg Tablet] 1 tab PO Q6 PRN #25 tablet


 PRN Reason: 


Referrals: 


NASIR COLLINS DO [ACTIVE STAFF] - 


Discharge Diet: As Tolerated


Respiratory Treatments at Home: Deep Breathing/Coughing


Discharge Activity: No Lifting Over 10 Pounds, No Lifting/Push/Pulling


Report the Following to Your Physician Immediately: Unusual Bleeding, Redness, 

Swelling, Warmth, Increased Soreness

## 2019-02-01 NOTE — OPERATIVE REPORT
Operative Report


DATE OF SURGERY: 02/01/19


PREOPERATIVE DIAGNOSIS: Left small finger proximal phalanx fracture


POSTOPERATIVE DIAGNOSIS: Same


OPERATION: Closed reduction percutaneous pinning extra-articular left small 

finger proximal phalanx fracture


SURGEON: MELLISSA COLLINS


ANESTHESIA: LMAC


COMPLICATIONS: 





None


ESTIMATED BLOOD LOSS: Minimal


PROCEDURE: 





Indication for above procedure:





73-year-old female who sustained a fall injuring her left small finger.  She had

notable fracture of the proximal phalanx with angulation she was then closed re

duced at my office she was placed in a ulnar gutter splint which did adequately 

reduce the fracture.  However after 2-week follow-up  x-rays demonstrate 

increased dorsal angulation.  At that point we discussed treatment options 

including operative versus nonoperative intervention given the amount of 

hyperextension and limited motion decision was made to proceed with operative 

treatment including open reduction versus closed reduction percutaneous 

pinning/internal fixation left small finger.





Procedure In Detail:





Patient was seen and evaluated in the preoperative holding area.  The LEFT upper

extremity was initialized and marked.  Patient received  600 mg of clindamycinIV

for bacterial prophylaxis.  Patient was taken back to the operative room where 

transferred to the operative table.  Once they were adequately anesthetized a 

nonsterile tourniquet was placed on the upper extremity.  A surgical team 

debriefing was performed ensuring all instrumentation was available, the 

surgical procedure was discussed with possible concerns reviewed.  A digital 

block was performed utilizing 10 mL of  1% lidocaine without epinephrine.  The 

upper extremity was prepped with chlorhexidine and alcohol and draped in a 

sterile fashion.   A timeout was done identifying correct patient, procedure and

extremity everyone in attendance agree with this and verbalized no concerns. The

extremity was exsanguinated the tourniquet was inflated to 250 mmHg.





Closed reduction was performed to the proximal phalanx placing the finger in a 

hyperflexed intrinsic plus position correcting malrotation.  A 0.045 K wire was 

then placed from the base of the proximal phalanx radially obtaining fixation 

into the far cortex.  A second 0.045 K wire was then placed from the base of the

proximal phalanx along the ulnar aspect obtaining fixation from the far cortex. 

C-arm fluoroscopy was then obtained which demonstrated restoration of alignment 

minimal angulation on AP and lateral view no evidence of shortening or di

splacement.  No evidence of malrotation with forearm squeeze or tenodesis.  Live

fluoroscopy was performed confirming stability of the fracture.  K wire was then

cut and left outside the skin and Xeroform placed.  Patient was placed in a 

ulnar gutter splint maintaining the intrinsic plus position.  Tourniquet was 

deflated patient had good peripheral perfusion.








Sponge counts, instrument counts, needle counts counts were correct.  Patient 

was then awoken from anesthesia.  Transferred from the operating room table to 

the operating room stretcher.  There was no intraoperative complications patient

tolerated procedure well stable to PACU.








Postoperative plan:





Patient will follow-up as scheduled for wound check.  We will obtain x-rays and 

set the patient up for occupational therapy to be fitted for a thermoplastic 

ulnar gutter splint.

## 2019-02-01 NOTE — RADIOLOGY REPORT (SQ)
EXAM DESCRIPTION:  NO CHG FLUORO; FINGER LEFT



COMPLETED DATE/TIME:  2/1/2019 4:06 pm



REASON FOR STUDY:  LEFT FINGER PINNING S62.616A  DISP FX OF PROXIMAL PHALANX OF RIGHT LITTLE FINGER, 
Z79.01  LONG TERM (CURRENT) USE OF ANTICOAGULANTS



COMPARISON:  None.



FLUOROSCOPY TIME:  32 seconds.

4 images saved to PACS.



TECHNIQUE:  Intra-operative images acquired during surgical procedure to evaluate progress.

NUMBER OF IMAGES: 4 images.



LIMITATIONS:  None.



FINDINGS:  Images of the proximal phalanx with hardware placement.



IMPRESSION:  IMAGE(S) OBTAINED DURING PROCEDURE.



COMMENT:  Quality :  Final reports for procedures using fluoroscopy that document radiation exp
osure indices, or exposure time and number of fluorographic images (if radiation exposure indices are
 not available)

Please consult full operative report of the attending physician for description of the procedure.



TECHNICAL DOCUMENTATION:  JOB ID:  [id number]

 2011 [employer]- All Rights Reserved



Reading location - IP/workstation name: [email or web address]

## 2019-02-01 NOTE — RADIOLOGY REPORT (SQ)
EXAM DESCRIPTION:  NO CHG FLUORO; FINGER LEFT



COMPLETED DATE/TIME:  2/1/2019 4:06 pm



REASON FOR STUDY:  LEFT FINGER PINNING S62.616A  DISP FX OF PROXIMAL PHALANX OF RIGHT LITTLE FINGER, 
Z79.01  LONG TERM (CURRENT) USE OF ANTICOAGULANTS



COMPARISON:  None.



FLUOROSCOPY TIME:  32 seconds.

4 images saved to PACS.



TECHNIQUE:  Intra-operative images acquired during surgical procedure to evaluate progress.

NUMBER OF IMAGES: 4 images.



LIMITATIONS:  None.



FINDINGS:  Images of the proximal phalanx with hardware placement.



IMPRESSION:  IMAGE(S) OBTAINED DURING PROCEDURE.



COMMENT:  Quality :  Final reports for procedures using fluoroscopy that document radiation exp
osure indices, or exposure time and number of fluorographic images (if radiation exposure indices are
 not available)

Please consult full operative report of the attending physician for description of the procedure.



TECHNICAL DOCUMENTATION:  JOB ID:  [id number]

 2011 [employer]- All Rights Reserved



Reading location - IP/workstation name: [email or web address]

## 2019-02-25 ENCOUNTER — HOSPITAL ENCOUNTER (EMERGENCY)
Dept: HOSPITAL 62 - ER | Age: 74
Discharge: HOME | End: 2019-02-25
Payer: MEDICARE

## 2019-02-25 VITALS — SYSTOLIC BLOOD PRESSURE: 170 MMHG | DIASTOLIC BLOOD PRESSURE: 73 MMHG

## 2019-02-25 DIAGNOSIS — M54.2: Primary | ICD-10-CM

## 2019-02-25 DIAGNOSIS — R51: ICD-10-CM

## 2019-02-25 DIAGNOSIS — Z87.891: ICD-10-CM
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
## 2019-02-25 DIAGNOSIS — I25.10: ICD-10-CM

## 2019-02-25 DIAGNOSIS — Z79.899: ICD-10-CM

## 2019-02-25 DIAGNOSIS — I10: ICD-10-CM

## 2019-02-25 DIAGNOSIS — J44.9: ICD-10-CM

## 2019-02-25 DIAGNOSIS — I50.9: ICD-10-CM

## 2019-02-25 PROCEDURE — 99283 EMERGENCY DEPT VISIT LOW MDM: CPT

## 2019-02-25 NOTE — ER DOCUMENT REPORT
Addendum entered and electronically signed by SLY BARBOZA PA-C  19

23:48: 








Discharge





- Discharge


Clinical Impression: 


 Neck pain





Fall


Qualifiers:


 Encounter type: initial encounter Qualified Code(s): W19.XXXA - Unspecified 

fall, initial encounter





Headache


Qualifiers:


 Headache type: unspecified Headache chronicity pattern: acute headache 

Intractability: not intractable Qualified Code(s): R51 - Headache





Condition: Good


Disposition: HOME, SELF-CARE


Additional Instructions: 


You have been seen in the Emergency Department (ED) today following a fall.  

Your workup today did not reveal any injuries that require you to stay in the 

hospital. You can expect, though, to be stiff and sore for the next several 

days.  You can take Tylenol 1000 mg every 6 hours as needed for pain.  You can 

apply a hot pack or electric heating pad to the sore areas.  You can also use 

topical "Aspercreme with lidocaine" to sore areas as needed.


Please follow up with your primary care doctor as soon as possible regarding 

today's ED visit and your recent fall.


Call your doctor or return to the ED if you develop a sudden or severe headache,

confusion, slurred speech, facial droop, weakness or numbness in any arm or leg,

 extreme fatigue, vomiting more than two times, severe abdominal pain, or other 

symptoms that concern you.


Referrals: 


MEDINA MARTINEZ PA [Primary Care Provider] - Follow up as needed





Original Note:








ED Fall





- General


Chief Complaint: Fall Injury


Stated Complaint: FALL


Time Seen by Provider: 19 23:40


Primary Care Provider: 


MEDINA MARTINEZ PA [Primary Care Provider] - Follow up as needed


Notes: 





73 female presents for a fall she said she missed stepped and fell on her right 

side.  She is complaining of headache and some neck pain.  Has an existing 

fracture that is currently splinted.  Denies hitting her head, loss of 

consciousness, dizziness, lightheadedness, palpitations, any syncopal episode.  

She denies any shoulder or elbow pain on her right side.  Patient states she is 

just has a headache and a little bit of paraspinal neck pain with no other 

symptoms.  No other complaints.


TRAVEL OUTSIDE OF THE [place] IN LAST 30 DAYS: No





- Related data


Allergies/Adverse Reactions: 


                                        





amoxicillin Allergy (Verified 19 11:17)


   


Penicillins Allergy (Verified 19 10:54)


   











Past Medical History





- Social History


Smoking Status: Former Smoker


Family History: Reviewed & Not Pertinent, Hypertension





- Past Medical History


Cardiac Medical History: Reports: Hx Congestive Heart Failure, Hx Coronary 

Artery Disease, Hx Heart Attack - , Hx Hypercholesterolemia, Hx Hypertension

- ON MEDS


   Denies: Hx Atrial Fibrillation, Hx Peripheral Vascular Disease, Hx Pulmonary 

Embolism, Hx Heart Murmur


Pulmonary Medical History: Reports: Hx Bronchitis, Hx COPD, Hx Pneumonia - YRS 

AGO


   Denies: Hx Asthma, Hx Respiratory Failure, Hx Sleep Apnea, Hx Tuberculosis


Neurological Medical History: Denies: Hx Cerebrovascular Accident, Hx Seizures


Endocrine Medical History: Denies: Hx Diabetes Mellitus Type 1, Hx Diabetes 

Mellitus Type 2


Renal/ Medical History: Reports: Hx Renal Insufficiency.  Denies: Hx End Stage

Renal Disease, Hx Kidney Stones, Hx Peritoneal Dialysis


Malignancy Medical History: Denies: Hx Leukemia, Hx Lung Cancer


GI Medical History: Reports: Hx Crohn's Disease - Patient has either Crohn's 

disease or ulcerative colitis, Hx Diverticulitis, Hx Gastroesophageal Reflux 

Disease.  Denies: Hx Hepatitis, Hx Hiatal Hernia, Hx Irritable Bowel, Hx Liver 

Failure, Hx Pancreatitis, Hx Ulcer


Musculoskeletal Medical History: Reports Hx Arthritis, Denies Hx Fibromyalgia, 

Denies Hx Muscular Dystrophy


Psychiatric Medical History: Reports: Hx Depression


   Denies: Hx Bipolar Disorder, Hx Post Traumatic Stress Disorder, Hx 

Schizophrenia


Traumatic Medical History: Reports: Hx Fractures - right wrist, left foot


Infectious Medical History: Denies: Hx Hepatitis, Hx HIV


Past Surgical History: Reports: Hx Appendectomy, Hx Cardiac Catheterization, Hx 

Cardiac Surgery - AICD for ventricular fibrillation, Hx  Section - x4, 

Hx Coronary Stent, Hx Hysterectomy, Hx Orthopedic Surgery - bilateral knee 

surgery, Hx Pacemaker, Hx Tonsillectomy.  Denies: Hx Bowel Surgery, Hx 

Cholecystectomy, Hx Colostomy, Hx Coronary Artery Bypass Graft, Hx Gastric 

Bypass Surgery, Hx Herniorrhaphy, Hx Mastectomy, Hx Open Heart Surgery, Hx Tubal

Ligation





- Immunizations


Immunizations up to date: Yes


Hx Diphtheria, Pertussis, Tetanus Vaccination: Yes


Hx Pneumococcal Vaccination: 18





Physical Exam





- Vital signs


Vitals: 





                                        











Temp Pulse Resp BP Pulse Ox


 


 98.3 F   57 L  20   170/73 H  96 


 


 19 21:49  19 21:49  19 21:49  19 21:49  19 21:49














- Notes


Notes: 





PHYSICAL EXAMINATION:





Reviewed vital signs and charting by RN





GENERAL: Alert, interacts well. No acute distress.


HEAD: Normocephalic, atraumatic.


EYES: Pupils equal, round. Extraocular movements intact.


NECK: Full range of motion. Supple. Trachea midline.  Tenderness to palpation 

right upper trapezius muscle, normal range of motion in neck


EXTREMITIES: Moves all 4 extremities spontaneously. No edema,  No cyanosis.


BACK: no cervical, thoracic, lumbar midline tenderness. No saddle anesthesia, 

normal distal neurovascular exam. 


NEUROLOGICAL: Alert. Normal speech. 


PSYCH: Normal affect, normal mood.


SKIN: Warm, dry, normal turgor. No rashes or lesions noted.





Course





- Re-evaluation


Re-evalutation: 





19 23:44


Patient complaint of acute pain.  normal neuro exam.  No loss of consciousness 

did not hit her head.  Patient appears in mild distress.  We will give her a 

Norco dose pack.  I explained she needs follow-up with a primary doctor.





- Vital Signs


Vital signs: 





                                        











Temp Pulse Resp BP Pulse Ox


 


 98.3 F   57 L  20   170/73 H  96 


 


 19 21:49  19 21:49  19 21:49  19 21:49  19 21:49














Discharge





- Discharge


Clinical Impression: 


 Neck pain





Fall


Qualifiers:


 Encounter type: initial encounter Qualified Code(s): W19.XXXA - Unspecified 

fall, initial encounter





Headache


Qualifiers:


 Headache type: unspecified Headache chronicity pattern: acute headache 

Intractability: not intractable Qualified Code(s): R51 - Headache





Referrals: 


MEDINA MARTINEZ PA [Primary Care Provider] - Follow up as needed

## 2019-03-16 ENCOUNTER — HOSPITAL ENCOUNTER (EMERGENCY)
Dept: HOSPITAL 62 - ER | Age: 74
Discharge: HOME | End: 2019-03-16
Payer: MEDICARE

## 2019-03-16 VITALS — DIASTOLIC BLOOD PRESSURE: 81 MMHG | SYSTOLIC BLOOD PRESSURE: 174 MMHG

## 2019-03-16 DIAGNOSIS — I25.10: ICD-10-CM
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
## 2019-03-16 DIAGNOSIS — M79.602: Primary | ICD-10-CM

## 2019-03-16 DIAGNOSIS — I10: ICD-10-CM

## 2019-03-16 DIAGNOSIS — F17.200: ICD-10-CM

## 2019-03-16 DIAGNOSIS — J44.9: ICD-10-CM

## 2019-03-16 DIAGNOSIS — Z88.0: ICD-10-CM

## 2019-03-16 PROCEDURE — 99283 EMERGENCY DEPT VISIT LOW MDM: CPT

## 2019-03-16 NOTE — ER DOCUMENT REPORT
ED General





- General


Chief Complaint: Arm Pain


Stated Complaint: SHOULDER PAIN


Time Seen by Provider: 19 12:34


Primary Care Provider: 


MEDINA MARTINEZ PA [Primary Care Provider] - Follow up as needed


Notes: 





73-year-old female here with complaints of left arm pain that starts at the 

fingertips and radiates up to the left shoulder and left neck.  She fell down 

December 15, 2019 and ever since then, she has had daily pain in these areas.  

The pain is worse with movement.  The pain is improved with minimizing movement.

 She has tried taking oxycodone for the pain however she has run out of the 

oxycodone.  She denies any chest pain shortness of breath.  She has been 

following with her doctor outpatient for this.


TRAVEL OUTSIDE OF THE [place] IN LAST 30 DAYS: No





- Related Data


Allergies/Adverse Reactions: 


                                        





amoxicillin Allergy (Verified 19 12:03)


   


Penicillins Allergy (Verified 19 12:03)


   











Past Medical History





- Social History


Smoking Status: Current Every Day Smoker


Family History: Reviewed & Not Pertinent, Hypertension


Patient has suicidal ideation: No


Patient has homicidal ideation: No





- Past Medical History


Cardiac Medical History: Reports: Hx Congestive Heart Failure, Hx Coronary 

Artery Disease, Hx Heart Attack - , Hx Hypercholesterolemia, Hx Hypertension

- ON MEDS


   Denies: Hx Atrial Fibrillation, Hx Peripheral Vascular Disease, Hx Pulmonary 

Embolism, Hx Heart Murmur


Pulmonary Medical History: Reports: Hx Bronchitis, Hx COPD, Hx Pneumonia - YRS 

AGO


   Denies: Hx Asthma, Hx Respiratory Failure, Hx Sleep Apnea, Hx Tuberculosis


Neurological Medical History: Denies: Hx Cerebrovascular Accident, Hx Seizures


Endocrine Medical History: Denies: Hx Diabetes Mellitus Type 1, Hx Diabetes 

Mellitus Type 2


Renal/ Medical History: Reports: Hx Renal Insufficiency.  Denies: Hx End Stage

Renal Disease, Hx Kidney Stones, Hx Peritoneal Dialysis


Malignancy Medical History: Denies: Hx Leukemia, Hx Lung Cancer


GI Medical History: Reports: Hx Crohn's Disease - Patient has either Crohn's 

disease or ulcerative colitis, Hx Diverticulitis, Hx Gastroesophageal Reflux 

Disease.  Denies: Hx Hepatitis, Hx Hiatal Hernia, Hx Irritable Bowel, Hx Liver 

Failure, Hx Pancreatitis, Hx Ulcer


Musculoskeletal Medical History: Reports Hx Arthritis, Denies Hx Fibromyalgia, 

Denies Hx Muscular Dystrophy


Psychiatric Medical History: Reports: Hx Depression


   Denies: Hx Bipolar Disorder, Hx Post Traumatic Stress Disorder, Hx 

Schizophrenia


Traumatic Medical History: Reports: Hx Fractures - right wrist, left foot


Infectious Medical History: Denies: Hx Hepatitis, Hx HIV


Past Surgical History: Reports: Hx Appendectomy, Hx Cardiac Catheterization, Hx 

Cardiac Surgery - AICD for ventricular fibrillation, Hx  Section - x4, 

Hx Coronary Stent, Hx Hysterectomy, Hx Orthopedic Surgery - bilateral knee 

surgery, Hx Pacemaker, Hx Tonsillectomy.  Denies: Hx Bowel Surgery, Hx 

Cholecystectomy, Hx Colostomy, Hx Coronary Artery Bypass Graft, Hx Gastric 

Bypass Surgery, Hx Herniorrhaphy, Hx Mastectomy, Hx Open Heart Surgery, Hx Tubal

Ligation





- Immunizations


Immunizations up to date: Yes


Hx Diphtheria, Pertussis, Tetanus Vaccination: Yes


Hx Pneumococcal Vaccination: 18





Review of Systems





- Review of Systems


Notes: 





See history of present illness for pertinent positive review of systems; 

otherwise all review of systems have been reviewed and are negative





Physical Exam





- Vital signs


Vitals: 





                                        











Temp Pulse Resp BP Pulse Ox


 


 97.7 F   64   18   174/81 H  98 


 


 19 12:07  19 12:07  19 12:07  19 12:07  19 12:07














- Notes


Notes: 





PHYSICAL EXAMINATION:





GENERAL: Well-appearing and in no acute distress.





HEAD: Atraumatic, normocephalic.





EYES: Pupils equal round and reactive to light, extraocular movements intact, 

sclera anicteric, conjunctiva are normal.





ENT: nares patent, oropharynx clear without exudates.  Moist mucous membranes.





NECK: Normal range of motion, supple without lymphadenopathy





LUNGS: CTAB and equal.  No wheezes rales or rhonchi.





HEART: Regular rate and rhythm without murmurs





ABDOMEN: Soft, no tenderness.  No facial grimacing/wincing upon palpation.  No 

guarding, no rebound.





EXTREMITIES: Normal range of motion, no pitting edema.  No cyanosis.  Exquisite 

tenderness to palpation of the left hand forearm arm shoulder and left superior 

trapezius musculature but no midline cervical spine TTP





NEUROLOGICAL: Cranial nerves grossly intact. Normal sensory/motor exams.





PSYCH: Normal mood, normal affect.





SKIN: Warm, Dry, normal turgor, no rashes or lesions noted





Course





- Re-evaluation


Re-evalutation: 





19 12:45


MEDICAL DECISION MAKING:


Concern for musculoskeletal strain


The fall occurred 3 months ago


At this point in time, I do not feel radiographs needed


Will give a dose of pain medication here and prescription Mobic soma


Instructed follow-up PCP next day or few


Patient understands and agrees to the plan of care





- Vital Signs


Vital signs: 





                                        











Temp Pulse Resp BP Pulse Ox


 


 97.7 F   64   18   174/81 H  98 


 


 19 12:07  19 12:07  19 12:07  19 12:07  19 12:07














Discharge





- Discharge


Clinical Impression: 


 Left arm pain





Condition: Good


Disposition: HOME, SELF-CARE


Additional Instructions: 


Use the MOBIC for pain.  Use the SOMA for muscle relaxation however please be 

aware that this muscle relaxer will make you sleepy.  You were seen in the 

emergency department at [facility]. If you were given any sedating

medications, be sure not to operate heavy machinery (example - driving) and be 

sure you are not too sedated to walk appropriately.  Please followup with your 

primary physician in the next few days for further management/evaluation.  

Please return to the emergency department for worsening of symptoms or any 

symptom that you deem to be concerning or life-threatening.  Thank you for 

allowing us to be part of your care.


Prescriptions: 


Carisoprodol [Soma 350 Mg Tablet] 350 mg PO BIDP PRN #10 tablet


 PRN Reason: 


Meloxicam [Mobic] 7.5 mg PO DAILYP PRN #7 tablet


 PRN Reason: 


Referrals: 


MEDINA MARTINEZ PA [Primary Care Provider] - Follow up in 3-5 days

## 2019-06-25 ENCOUNTER — HOSPITAL ENCOUNTER (OUTPATIENT)
Dept: HOSPITAL 62 - RAD | Age: 74
End: 2019-06-25
Attending: FAMILY MEDICINE
Payer: MEDICARE

## 2019-06-25 DIAGNOSIS — R42: Primary | ICD-10-CM

## 2019-06-25 PROCEDURE — 70450 CT HEAD/BRAIN W/O DYE: CPT

## 2019-08-14 ENCOUNTER — HOSPITAL ENCOUNTER (OUTPATIENT)
Dept: HOSPITAL 62 - OD | Age: 74
End: 2019-08-14
Attending: PHYSICIAN ASSISTANT
Payer: MEDICARE

## 2019-08-14 DIAGNOSIS — R07.81: Primary | ICD-10-CM

## 2019-08-14 DIAGNOSIS — R91.8: ICD-10-CM

## 2019-08-14 PROCEDURE — 71046 X-RAY EXAM CHEST 2 VIEWS: CPT

## 2019-08-14 NOTE — RADIOLOGY REPORT (SQ)
EXAM DESCRIPTION:  CHEST PA/LATERAL



COMPLETED DATE/TIME:  8/14/2019 11:46 am



REASON FOR STUDY:  PLEURODYNIA



COMPARISON:  1/31/2019



EXAM PARAMETERS:  NUMBER OF VIEWS: two views

TECHNIQUE: Digital Frontal and Lateral radiographic views of the chest acquired.

RADIATION DOSE: NA

LIMITATIONS: none



FINDINGS:  LUNGS AND PLEURA: Persistent 2 cm mass left perihilar.  Right lung is clear.

MEDIASTINUM AND HILAR STRUCTURES: No masses or contour abnormalities.

HEART AND VASCULAR STRUCTURES: Heart normal size.  No evidence for failure.

BONES: No acute findings.

HARDWARE: Cardiac hardware unchanged.

OTHER: No other significant finding.



IMPRESSION:  Persistent left perihilar mass lesion measuring 2 cm.  Malignancy suspected.



COMMENT:   The findings were sent to the [facility] at 13:39 on 8/14/2019
 to be communicated to a licensed caregiver.



TECHNICAL DOCUMENTATION:  JOB ID:  [id number]

 2011 [employer]- All Rights Reserved



Reading location - IP/workstation name: [email or web address]

## 2019-08-19 ENCOUNTER — HOSPITAL ENCOUNTER (OUTPATIENT)
Dept: HOSPITAL 62 - RAD | Age: 74
End: 2019-08-19
Attending: FAMILY MEDICINE
Payer: MEDICARE

## 2019-08-19 DIAGNOSIS — I51.7: ICD-10-CM

## 2019-08-19 DIAGNOSIS — R91.8: ICD-10-CM

## 2019-08-19 DIAGNOSIS — R91.1: Primary | ICD-10-CM

## 2019-08-19 DIAGNOSIS — Z72.0: ICD-10-CM

## 2019-08-19 PROCEDURE — 71250 CT THORAX DX C-: CPT

## 2019-08-19 NOTE — RADIOLOGY REPORT (SQ)
EXAM DESCRIPTION:  CT CHEST WITHOUT



COMPLETED DATE/TIME:  8/19/2019 1:28 pm



REASON FOR STUDY:  R91.8 OTHER NONSPECIFIC ABNORMAL FINDING OF LUNG FIELD R91.8  OTHER NONSPECIFIC AB
NORMAL FINDING OF LUNG FIELD



COMPARISON:  7/20/2018



TECHNIQUE:  CT scan performed of the chest without intravenous contrast.  Images reviewed with lung, 
soft tissue and bone windows.  Reconstructed coronal and sagittal MPR images reviewed.  All images st
ored on PACS.

All CT scanners at this facility use dose modulation, iterative reconstruction, and/or weight based d
osing when appropriate to reduce radiation dose to as low as reasonably achievable (ALARA).

[facility]: Dose Right  [facility]: CareDose    [facility]: Dose Right    [facility]: Teradose 4D    [facility]: Smart Technologies



RADIATION DOSE:  CT Rad equipment meets quality standard of care and radiation dose reduction techniq
ues were employed. CTDIvol: 4.7 mGy. DLP: 180 mGy-cm. mGy.



LIMITATIONS:  No technical limitations.



FINDINGS:  LUNGS AND PLEURA: There is a 16 x 24 mm suprahilar mass on the left with stranding to the 
hilum and with slight nodularity extending anterolaterally.  This mass is significantly larger than o
n the prior study.

HILAR AND MEDIASTINAL STRUCTURES: No identified masses or abnormal nodes.  No obvious aneurysm.

HEART AND VASCULAR STRUCTURES: Cardiomegaly.

UPPER ABDOMEN: No significant findings.  Limited exam.

THYROID AND OTHER SOFT TISSUES: No masses.  No adenopathy.

BONES: No significant finding.

HARDWARE: Pacemaker/defibrillator.  There appears to be a 2nd device overlying the abdomen with impla
nted pericardial electrodes.

OTHER: No other significant findings.



IMPRESSION:  Left upper lobe lung mass has increased in size since the prior study.  There is cardiom
egaly.



TECHNICAL DOCUMENTATION:  JOB ID:  [id number]

Quality ID # 436: Final reports with documentation of one or more dose reduction techniques (e.g., Au
tomated exposure control, adjustment of the mA and/or kV according to patient size, use of iterative 
reconstruction technique)

 2011 [employer]- All Rights Reserved



Reading location - IP/workstation name: [email or web address]

## 2019-08-24 ENCOUNTER — HOSPITAL ENCOUNTER (EMERGENCY)
Dept: HOSPITAL 62 - ER | Age: 74
Discharge: HOME | End: 2019-08-24
Payer: MEDICARE

## 2019-08-24 VITALS — SYSTOLIC BLOOD PRESSURE: 139 MMHG | DIASTOLIC BLOOD PRESSURE: 65 MMHG

## 2019-08-24 DIAGNOSIS — F17.200: ICD-10-CM

## 2019-08-24 DIAGNOSIS — I25.2: ICD-10-CM

## 2019-08-24 DIAGNOSIS — E78.00: ICD-10-CM
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
## 2019-08-24 DIAGNOSIS — I25.10: ICD-10-CM

## 2019-08-24 DIAGNOSIS — Z90.710: ICD-10-CM

## 2019-08-24 DIAGNOSIS — M25.561: ICD-10-CM

## 2019-08-24 DIAGNOSIS — I50.9: ICD-10-CM

## 2019-08-24 DIAGNOSIS — I11.0: ICD-10-CM

## 2019-08-24 DIAGNOSIS — Z95.810: ICD-10-CM

## 2019-08-24 DIAGNOSIS — S69.92XA: Primary | ICD-10-CM

## 2019-08-24 PROCEDURE — 99283 EMERGENCY DEPT VISIT LOW MDM: CPT

## 2019-08-24 NOTE — RADIOLOGY REPORT (SQ)
EXAM DESCRIPTION:  WRIST LEFT 3 VIEWS



COMPLETED DATE/TIME:  8/24/2019 10:18 am



REASON FOR STUDY:  fall



COMPARISON:  None.



NUMBER OF VIEWS:  Three views.



TECHNIQUE:  AP, lateral, and oblique radiographic images acquired of the left wrist.



LIMITATIONS:  None.



FINDINGS:  MINERALIZATION: Osteopenia.

BONES: No acute fracture or dislocation.  No worrisome bone lesions.  Normal alignment.

SOFT TISSUES: No soft tissue swelling.  No foreign body.

OTHER: No other significant finding.



IMPRESSION:  NO RADIOGRAPHIC EVIDENCE OF ACUTE INJURY.



TECHNICAL DOCUMENTATION:  JOB ID:  [id number]

 2011 [employer]- All Rights Reserved



Reading location - IP/workstation name: [email or web address]

## 2019-08-24 NOTE — ER DOCUMENT REPORT
HPI





- HPI


Patient complains to provider of: R knee and L wrist pain s/p fall


Time Seen by Provider: 19 09:58


Pain Level: 3


Context: 





Very pleasant 74-year-old female presents to the emergency department with chief

complaint of right knee and left wrist pain after a fall this past Tuesday.  She

was getting out of her car and just fell.  She states that she has been having 

frequent falls and is seeing her primary, Dr. Caballero, who is in the process of 

working her up.  Patient denies hitting her head, denies loss of consciousness, 

denies heart palpitations, denies any other inciting incident.  Patient is able 

to bear weight on her right leg and ambulated into the emergency department 

without assistance.  Patient denies any unilateral leg swelling, posterior 

tenderness of the right calf, acute shortness of breath or chest pain, prolonged

immobilization, cancer, or any long distance travel.





- CONSTITUTIONAL


Constitutional: DENIES: Fever, Chills





- REPRODUCTIVE


Reproductive: DENIES: Pregnant:





- MUSCULOSKELETAL


Musculoskeletal: REPORTS: Extremity pain - see note





Past Medical History





- Social History


Smoking Status: Current Every Day Smoker


Frequency of alcohol use: None


Drug Abuse: None


Family History: Reviewed & Not Pertinent, Hypertension


Patient has suicidal ideation: No


Patient has homicidal ideation: No





- Past Medical History


Cardiac Medical History: Reports: Hx Congestive Heart Failure, Hx Coronary 

Artery Disease, Hx Heart Attack - , Hx Hypercholesterolemia, Hx Hypertension

- ON MEDS


   Denies: Hx Atrial Fibrillation, Hx Peripheral Vascular Disease, Hx Pulmonary 

Embolism, Hx Heart Murmur


Pulmonary Medical History: Reports: Hx Bronchitis, Hx COPD, Hx Pneumonia - YRS 

AGO


   Denies: Hx Asthma, Hx Respiratory Failure, Hx Sleep Apnea, Hx Tuberculosis


Neurological Medical History: Denies: Hx Cerebrovascular Accident, Hx Seizures


Endocrine Medical History: Denies: Hx Diabetes Mellitus Type 1, Hx Diabetes 

Mellitus Type 2


Renal/ Medical History: Reports: Hx Renal Insufficiency.  Denies: Hx End Stage

Renal Disease, Hx Kidney Stones, Hx Peritoneal Dialysis


Malignancy Medical History: Denies: Hx Leukemia, Hx Lung Cancer


GI Medical History: Reports: Hx Crohn's Disease - Patient has either Crohn's 

disease or ulcerative colitis, Hx Diverticulitis, Hx Gastroesophageal Reflux 

Disease.  Denies: Hx Hepatitis, Hx Hiatal Hernia, Hx Irritable Bowel, Hx Liver 

Failure, Hx Pancreatitis, Hx Ulcer


Musculoskeletal Medical History: Reports Hx Arthritis, Denies Hx Fibromyalgia, 

Denies Hx Muscular Dystrophy


Psychiatric Medical History: Reports: Hx Depression


   Denies: Hx Bipolar Disorder, Hx Post Traumatic Stress Disorder, Hx 

Schizophrenia


Traumatic Medical History: Reports: Hx Fractures - right wrist, left foot


Infectious Medical History: Denies: Hx Hepatitis, Hx HIV


Past Surgical History: Reports: Hx Appendectomy, Hx Cardiac Catheterization, Hx 

Cardiac Surgery - AICD for ventricular fibrillation, Hx  Section - x4, 

Hx Coronary Stent, Hx Hysterectomy, Hx Orthopedic Surgery - bilateral knee 

surgery, Hx Pacemaker, Hx Tonsillectomy.  Denies: Hx Bowel Surgery, Hx 

Cholecystectomy, Hx Colostomy, Hx Coronary Artery Bypass Graft, Hx Gastric 

Bypass Surgery, Hx Herniorrhaphy, Hx Mastectomy, Hx Open Heart Surgery, Hx Tubal

Ligation





- Immunizations


Immunizations up to date: Yes


Hx Diphtheria, Pertussis, Tetanus Vaccination: Yes


Hx Pneumococcal Vaccination: 18





Vertical Provider Document





- CONSTITUTIONAL


Notes: 





PHYSICAL EXAMINATION:





Reviewed vital signs and charting by RN





GENERAL: Alert, interacts well. No acute distress.


HEAD: Normocephalic, atraumatic.


EYES: Pupils equal and round. Extraocular movements intact.


ENT: Oral mucosa moist, tongue midline. 


NECK: Full range of motion. Trachea midline.


LUNGS: Clear to auscultation bilaterally, no wheezes, rales, or rhonchi. No 

respiratory distress.


HEART: Regular rate and rhythm. No murmur


ABDOMEN: soft, non-tender.  No distention. Bowel sounds present


EXTREMITIES: Moves all 4 extremities spontaneously. No edema,  No cyanosis.  

Tenderness to palpation over the dorsal aspect of the distal radius and ulna 

bones and tenderness to palpation over the soft tissue, no snuffbox tenderness. 

Patient with tenderness to palpation over the medial and lateral aspects of the 

right knee, no tenderness over the patellar ligament, normal distal 

neurovascular exam


PSYCH: Normal affect, normal mood.


SKIN: Warm, dry, normal turgor. No rashes or lesions noted.








- INFECTION CONTROL


TRAVEL OUTSIDE OF THE [place] IN LAST 30 DAYS: No





Course





- Re-evaluation


Re-evalutation: 





19 10:16


Plan is to obtain a right knee x-ray and a left wrist x-ray splint or provide 

compression appropriately pending results.


19 11:00


X-rays were negative for any acute fractures or dislocations.  Because the 

injury happened last Tuesday and patient has good range of motion and strength I

have low concern for any potential significant injury.  At this time patient be 

given strict return precautions, discharge instructions and is stable for 

discharge.





- Vital Signs


Vital signs: 


                                        











Temp Pulse Resp BP Pulse Ox


 


 98.0 F   69   18   136/70 H  97 


 


 19 09:46  19 09:46  19 09:46  19 09:46  19 09:46














Discharge





- Discharge


Clinical Impression: 


Right knee pain


Qualifiers:


 Chronicity: acute Qualified Code(s): M25.561 - Pain in right knee





Left wrist injury


Qualifiers:


 Encounter type: initial encounter Qualified Code(s): S69.92XA - Unspecified 

injury of left wrist, hand and finger(s), initial encounter





Condition: Good


Disposition: HOME, SELF-CARE


Additional Instructions: 


You were seen in the emergency department this morning for a fall causing right 

knee and left wrist pain.  X-rays did not show any acute fractures or 

dislocations.  As such, we have placed Ace wraps on your right knee and on your 

left wrist for comfort.  Please follow-up with Dr. Caballero regarding today's visit

in the next week.  If you develop acute weakness in your knee or wrist, loss of 

sensation, paralysis, or any other concerning symptoms please immediately return

to the emergency department for reevaluation.


Referrals: 


BISMARK CABALLERO MD [Primary Care Provider] - Follow up as needed

## 2019-08-24 NOTE — RADIOLOGY REPORT (SQ)
EXAM DESCRIPTION:  KNEE RIGHT 4 VIEWS



COMPLETED DATE/TIME:  8/24/2019 10:18 am



REASON FOR STUDY:  fall



COMPARISON:  11/22/2018



NUMBER OF VIEWS:  Four views.



TECHNIQUE:  AP, lateral, and both oblique radiographic images acquired of the right knee.



LIMITATIONS:  None.



FINDINGS:  MINERALIZATION: Osteopenia.

BONES: No acute fracture or dislocation.  No worrisome bone lesions.

JOINT: No effusion.

SOFT TISSUES: No soft tissue swelling.  No radio-opaque foreign body.

OTHER: No other significant finding.



IMPRESSION:   NO RADIOGRAPHIC EVIDENCE OF ACUTE INJURY.



TECHNICAL DOCUMENTATION:  JOB ID:  [id number]

 2011 [employer]- All Rights Reserved



Reading location - IP/workstation name: [email or web address]

## 2019-10-07 ENCOUNTER — HOSPITAL ENCOUNTER (OUTPATIENT)
Dept: HOSPITAL 62 - RAD | Age: 74
End: 2019-10-07
Attending: INTERNAL MEDICINE
Payer: MEDICARE

## 2019-10-07 DIAGNOSIS — C34.12: Primary | ICD-10-CM

## 2019-10-07 PROCEDURE — 70470 CT HEAD/BRAIN W/O & W/DYE: CPT

## 2019-10-07 PROCEDURE — 82565 ASSAY OF CREATININE: CPT

## 2019-10-07 NOTE — RADIOLOGY REPORT (SQ)
EXAM DESCRIPTION:  CT HEAD COMBO



COMPLETED DATE/TIME:  10/7/2019 3:55 pm



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
EOPLASM OF UPPER LOBE, LEFT BRONCHUS OR KARRIE



COMPARISON:  CT brain 6/25/2019, 1/19/2018, 10/13/2015



TECHNIQUE:  Axial images acquired through the brain without and with intravenous contrast.  Images re
viewed with bone, brain and subdural windows.  Additional sagittal and coronal reconstructions were g
enerated. Images stored on PACS.

All CT scanners at this facility use dose modulation, iterative reconstruction, and/or weight based d
osing when appropriate to reduce radiation dose to as low as reasonably achievable (ALARA).

[facility]: Dose Right  [facility]: CareDose    [facility]: Dose Right    [facility]: Teradose 4D    [facility]: Smart Technologies



CONTRAST TYPE AND DOSE:  contrast/concentration: Isovue 350.00 mg/ml; Total Contrast Delivered: 50.0 
ml; Total Saline Delivered: 55.0 ml



RENAL FUNCTION:  Creatinine 1.3



RADIATION DOSE:  CT Rad equipment meets quality standard of care and radiation dose reduction techniq
ues were employed. CTDIvol: 48.5 - 48.5 mGy. DLP: 1709 mGy-cm..



LIMITATIONS:  None.



FINDINGS:  VENTRICLES: Normal size and contour.

CEREBRUM: No masses. No hemorrhage. No midline shift. Normal gray/white matter differentiation. No ev
idence for acute infarction. No enhancing lesions.  Benign punctate calcification right posterior fro
ntal deep periventricular white matter.

CEREBELLUM: No masses. No hemorrhage.  Old lacunar infarct right cerebellar hemisphere.  No evidence 
for acute infarction. No enhancing lesions.

EXTRA-AXIAL SPACES: No fluid collections. No enhancing lesions.

ORBITS AND GLOBE: No intra- or extraconal masses.  Post bilateral cataract surgery.

CALVARIUM: No fracture.

PARANASAL SINUSES: No fluid or mucosal thickening.

SOFT TISSUES: No mass or hematoma.

OTHER: Heavily calcified distal intracranial vertebral arteries and parasellar carotid arteries.



IMPRESSION:  No CT evidence of brain parenchymal metastatic disease given history of lung cancer.

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

EVIDENCE OF ACUTE STROKE: NO.



TECHNICAL DOCUMENTATION:  JOB ID:  [id number]

Quality ID # 436: Final reports with documentation of one or more dose reduction techniques (e.g., Au
tomated exposure control, adjustment of the mA and/or kV according to patient size, use of iterative 
reconstruction technique)

 2011 [employer]- All Rights Reserved



Reading location - IP/workstation name: [email or web address]

## 2019-10-13 ENCOUNTER — HOSPITAL ENCOUNTER (OUTPATIENT)
Dept: HOSPITAL 62 - RAD | Age: 74
End: 2019-10-13
Attending: INTERNAL MEDICINE
Payer: MEDICARE

## 2019-10-13 DIAGNOSIS — C34.12: Primary | ICD-10-CM

## 2019-10-13 PROCEDURE — A9552 F18 FDG: HCPCS

## 2019-10-13 PROCEDURE — 78815 PET IMAGE W/CT SKULL-THIGH: CPT

## 2019-10-14 NOTE — RADIOLOGY REPORT (SQ)
EXAM DESCRIPTION:  PET CT SKULL/THIGH



COMPLETED DATE/TIME:  10/14/2019 8:18 am



REASON FOR STUDY:  (C34.12)MALIGNANT NEOPLASM OF UPPER LOBE, LEFT BRONCHUS OR LUNG C34.12  MALIGNANT 
NEOPLASM OF UPPER LOBE, LEFT BRONCHUS OR KARRIE



COMPARISON:  None.



RADIONUCLIDE AND DOSE:  9.25 mCi F18 FDG

The route of agent administration: Intravenous



FASTING BLOOD SUGAR:  78 mg/dl



CONTRAST TYPE AND DOSE:  No CT contrast given.



TECHNIQUE:  Blood glucose level was verified.  Above dose of FDG was injected intravenously.  2-D seg
mented attenuation correction images were obtained from the base of the skull to the midthighs.  Nonc
ontrast CT images were obtained for attenuation correction and fusion with emission images.  CT image
s were performed without oral or intravenous contrast and are not sensitive for parenchymal lesions. 
 A series of overlapping emission PET images were obtained.  Images reviewed and manipulated at [facility] work station by the radiologist.  Images stored on PACS.



LIMITATIONS:  None.



FINDINGS:  HEAD AND NECK: No areas of abnormal metabolic activity in the soft tissues of the head and
 neck.

CHEST: Left upper lobe hypermetabolic nodule 8.8 SUV, 2.4 x 1.8 cm AP by transverse diameter.

ABDOMEN AND PELVIS: No areas of abnormal metabolic activity in the abdomen or pelvis.  Expected physi
ologic activity is present in the genitourinary system and bowel.

PROXIMAL LOWER EXTREMITIES: No areas of abnormal metabolic activity in the soft tissues of the lower 
extremities.

BONES: No abnormal metabolic activity in the visualized skeleton.

ADDITIONAL CT FINDINGS: Subcentimeter calcified granulomas.  Cardiomegaly.  Pacemaker and external pa
cing leads.  Small hiatal hernia.  Large right renal cysts.  Nonobstructing 4 mm calculus lower pole 
left kidney.  Anterior abdominal wall granuloma.

OTHER: No other significant findings.



IMPRESSION:  Hypermetabolic nodule left upper lobe.  No evidence of metastatic disease.



TECHNICAL DOCUMENTATION:  JOB ID:  [id number]

 2011 [employer]- All Rights Reserved



Reading location - IP/workstation name: [email or web address]

## 2019-10-17 ENCOUNTER — HOSPITAL ENCOUNTER (EMERGENCY)
Dept: HOSPITAL 62 - ER | Age: 74
Discharge: HOME | End: 2019-10-17
Payer: MEDICARE

## 2019-10-17 VITALS — SYSTOLIC BLOOD PRESSURE: 154 MMHG | DIASTOLIC BLOOD PRESSURE: 82 MMHG

## 2019-10-17 DIAGNOSIS — Z88.0: ICD-10-CM
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
## 2019-10-17 DIAGNOSIS — M54.5: Primary | ICD-10-CM

## 2019-10-17 DIAGNOSIS — I25.10: ICD-10-CM
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
## 2019-10-17 DIAGNOSIS — Z87.891: ICD-10-CM

## 2019-10-17 DIAGNOSIS — M25.552: ICD-10-CM

## 2019-10-17 DIAGNOSIS — J44.9: ICD-10-CM

## 2019-10-17 DIAGNOSIS — I10: ICD-10-CM

## 2019-10-17 LAB
APPEARANCE UR: CLEAR
APTT PPP: (no result) S
BILIRUB UR QL STRIP: NEGATIVE
GLUCOSE UR STRIP-MCNC: NEGATIVE MG/DL
KETONES UR STRIP-MCNC: NEGATIVE MG/DL
NITRITE UR QL STRIP: NEGATIVE
PH UR STRIP: 7 [PH] (ref 5–9)
PROT UR STRIP-MCNC: NEGATIVE MG/DL
SP GR UR STRIP: 1
UROBILINOGEN UR-MCNC: NEGATIVE MG/DL (ref ?–2)

## 2019-10-17 PROCEDURE — 99284 EMERGENCY DEPT VISIT MOD MDM: CPT

## 2019-10-17 PROCEDURE — 72131 CT LUMBAR SPINE W/O DYE: CPT

## 2019-10-17 PROCEDURE — 81001 URINALYSIS AUTO W/SCOPE: CPT

## 2019-10-17 PROCEDURE — 72192 CT PELVIS W/O DYE: CPT

## 2019-10-17 NOTE — RADIOLOGY REPORT (SQ)
EXAM DESCRIPTION:  CT LUMBAR SPINE WITHOUT



COMPLETED DATE/TIME:  10/17/2019 11:34 am



REASON FOR STUDY:  Fall, L hip/pelvic and lbp. unwitnessed



COMPARISON:  None.



TECHNIQUE:  Axial images acquired through the lumbar spine without intravenous contrast.  Images revi
ewed with lung, soft tissue and bone windows.  Reconstructed coronal and sagittal MPR images reviewed
.  All images stored on PACS.

All CT scanners at this facility use dose modulation, iterative reconstruction, and/or weight based d
osing when appropriate to reduce radiation dose to as low as reasonably achievable (ALARA).

[facility]: Dose Right  [facility]: CareDose    [facility]: Dose Right    [facility]: Teradose 4D    [facility]: [employer] Technologies



RADIATION DOSE:   mGy.



LIMITATIONS:  None.



FINDINGS:  SEGMENTATION: Normal.  No transitional anatomy.

ALIGNMENT: Normal.

VERTEBRAL BODIES: No fractures.  No dislocation.  No acute findings.

DISCS: No significant protrusions.  Study limited by lack of intrathecal contrast.

PEDICLES, TRANSVERSE PROCESSES: No fractures.  No dislocation.  No acute findings.

FACETS, POSTERIOR ELEMENTS: No fractures.  No dislocation.  No spinal stenosis.

HARDWARE: None in the spine.

VISUALIZED RIBS: No fractures.

SOFT TISSUES: Left lower calyceal calculus.

OTHER: No other significant finding.



IMPRESSION:  No acute findings in the lumbar spine.  There is a nonobstructing intrarenal calculus on
 the left.



TECHNICAL DOCUMENTATION:  JOB ID:  [id number]

Quality ID # 436: Final reports with documentation of one or more dose reduction techniques (e.g., Au
tomated exposure control, adjustment of the mA and/or kV according to patient size, use of iterative 
reconstruction technique)

 2011 [employer]- All Rights Reserved



Reading location - IP/workstation name: [email or web address]

## 2019-10-17 NOTE — RADIOLOGY REPORT (SQ)
EXAM DESCRIPTION:  CT PELVIS WITHOUT



COMPLETED DATE/TIME:  10/17/2019 11:34 am



REASON FOR STUDY:  Fall, L hip/pelvic and lbp. unwitnessed



COMPARISON:  None.



TECHNIQUE:  CT scan of the pelvis performed without intravenous or oral contrast.  Images reviewed wi
th soft tissue and bone windows.  Reconstructed coronal and sagittal MPR images reviewed.  All images
 stored on PACS.

All CT scanners at this facility use dose modulation, iterative reconstruction, and/or weight based d
osing when appropriate to reduce radiation dose to as low as reasonably achievable (ALARA).

[facility]: Dose Right  [facility]: CareDose    [facility]: Dose Right    [facility]: Teradose 4D    [facility]: Smart Technologies



RADIATION DOSE:  CT Rad equipment meets quality standard of care and radiation dose reduction techniq
ues were employed. CTDIvol: 11.2 mGy. DLP: 335 mGy-cm. mGy.



LIMITATIONS:  None.



FINDINGS:  PELVIC BONES: No acute fracture. No worrisome bone lesions.

VISUALIZED SPINE: No acute findings.

HIP(S): No acute fracture or dislocation. No worrisome bone lesions.

PELVIC SOFT TISSUES: There is large right renal cyst in the lower abdomen.

EXTRAPELVIC SOFT TISSUES: No significant findings.

OTHER: No other significant finding.



IMPRESSION:  NO ACUTE OR SIGNIFICANT FINDINGS.



TECHNICAL DOCUMENTATION:  JOB ID:  [id number]

Quality ID # [id number]: Final reports with documentation of one or more dose reduction techniques (e.g., Au
tomated exposure control, adjustment of the mA and/or kV according to patient size, use of iterative 
reconstruction technique)

 2011 [employer]- All Rights Reserved



Reading location - IP/workstation name: [email or web address]

## 2019-10-17 NOTE — ER DOCUMENT REPORT
ED General





- General


Chief Complaint: Fall Injury


Stated Complaint: FALL


Time Seen by Provider: 10/17/19 08:19


Primary Care Provider: 


RADHA SOLIMAN MD [ACTIVE PROVISIONAL STAFF] - Follow up as needed


BISMARK CABALLERO MD [Primary Care Provider] - Follow up as needed


TRAVEL OUTSIDE OF THE [place] IN LAST 30 DAYS: No





- HPI


Notes: 





74-year-old female presents to the ED for evaluation of lower back pain and hip 

pain from a fall she sustained approximately 2 hours ago.  Denies head trauma or

change in loc.  Patient states  she fell backwards, her left side hit the sofa, 

denies hitting her head.  Has not tried any over-the-counter medications, pain 

is 4-10, throbbing achy.  Denies fevers, chills, chest pain,palpitations,  

shortness of breath, dyspnea, nausea, vomiting, diarrhea, abdominal pain, 

hematuria,blurred vision, double vision, loss of vision, speech changes, LH, 

dizziness, syncope, headaches, wheezing, ST, URI, neck pain, weakness, bowel or 

bladder dysfunction, saddle anesthesia, numbness or tingling in bilateral upper 

or lower extremities equally, muscle paralysis, weakness in bilateral upper or 

lower extremities equally or rash.





- Related Data


Allergies/Adverse Reactions: 


                                        





amoxicillin Allergy (Verified 10/17/19 07:48)


   


Penicillins Allergy (Verified 10/17/19 07:48)


   











Past Medical History





- General


Information source: Patient





- Social History


Smoking Status: Former Smoker


Chew tobacco use (# tins/day): No


Frequency of alcohol use: None


Drug Abuse: None


Family History: Reviewed & Not Pertinent, Hypertension


Patient has suicidal ideation: No


Patient has homicidal ideation: No





- Past Medical History


Cardiac Medical History: Reports: Hx Congestive Heart Failure, Hx Coronary 

Artery Disease, Hx Heart Attack - , Hx Hypercholesterolemia, Hx Hypertension

- ON MEDS


   Denies: Hx Atrial Fibrillation, Hx Peripheral Vascular Disease, Hx Pulmonary 

Embolism, Hx Heart Murmur


Pulmonary Medical History: Reports: Hx Bronchitis, Hx COPD, Hx Pneumonia - YRS 

AGO


   Denies: Hx Asthma, Hx Respiratory Failure, Hx Sleep Apnea, Hx Tuberculosis


Neurological Medical History: Denies: Hx Cerebrovascular Accident, Hx Seizures


Endocrine Medical History: Denies: Hx Diabetes Mellitus Type 1, Hx Diabetes 

Mellitus Type 2


Renal/ Medical History: Reports: Hx Renal Insufficiency.  Denies: Hx End Stage

Renal Disease, Hx Kidney Stones, Hx Peritoneal Dialysis


Malignancy Medical History: Denies: Hx Leukemia, Hx Lung Cancer


GI Medical History: Reports: Hx Crohn's Disease - Patient has either Crohn's 

disease or ulcerative colitis, Hx Diverticulitis, Hx Gastroesophageal Reflux 

Disease.  Denies: Hx Hepatitis, Hx Hiatal Hernia, Hx Irritable Bowel, Hx Liver 

Failure, Hx Pancreatitis, Hx Ulcer


Musculoskeletal Medical History: Reports Hx Arthritis, Denies Hx Fibromyalgia, 

Denies Hx Muscular Dystrophy


Psychiatric Medical History: Reports: Hx Depression


   Denies: Hx Bipolar Disorder, Hx Post Traumatic Stress Disorder, Hx 

Schizophrenia


Traumatic Medical History: Reports: Hx Fractures - right wrist, left foot


Infectious Medical History: Denies: Hx Hepatitis, Hx HIV


Past Surgical History: Reports: Hx Appendectomy, Hx Cardiac Catheterization, Hx 

Cardiac Surgery - AICD for ventricular fibrillation, Hx  Section - x4, 

Hx Coronary Stent, Hx Hysterectomy, Hx Orthopedic Surgery - bilateral knee 

surgery, Hx Pacemaker, Hx Tonsillectomy.  Denies: Hx Bowel Surgery, Hx 

Cholecystectomy, Hx Colostomy, Hx Coronary Artery Bypass Graft, Hx Gastric 

Bypass Surgery, Hx Herniorrhaphy, Hx Mastectomy, Hx Open Heart Surgery, Hx Tubal

Ligation





- Immunizations


Immunizations up to date: Yes


Hx Diphtheria, Pertussis, Tetanus Vaccination: Yes


Hx Pneumococcal Vaccination: 18





Review of Systems





- Review of Systems


Constitutional: No symptoms reported


EENT: No symptoms reported


Cardiovascular: No symptoms reported


Respiratory: No symptoms reported


Gastrointestinal: No symptoms reported


Genitourinary: No symptoms reported


Female Genitourinary: No symptoms reported


Musculoskeletal: See HPI


Skin: No symptoms reported


Hematologic/Lymphatic: No symptoms reported


Neurological/Psychological: No symptoms reported





Physical Exam





- Vital signs


Vitals: 


                                        











Temp Pulse Resp BP Pulse Ox


 


 97.5 F   64   18   154/80 H  100 


 


 10/17/19 07:37  10/17/19 07:37  10/17/19 07:37  10/17/19 07:37  10/17/19 07:37














- Notes


Notes: 





PHYSICAL EXAMINATION: reviewed vital signs by RN





GENERAL: Well-appearing, well-nourished and in no acute distress.





HEAD: Atraumatic, normocephalic.





EYES: Pupils equal round and reactive to light, extraocular movements intact, 

conjunctiva are normal.





ENT: Nares patent, oropharynx clear without exudates.  Moist mucous membranes.





NECK: Normal range of motion, supple without lymphadenopathy





LUNGS: Breath sounds clear to auscultation bilaterally and equal.  No wheezes 

rales or rhonchi.





HEART: Regular rate and rhythm without murmurs





ABDOMEN: Soft, nontender, nondistended abdomen.  No guarding, no rebound.  No 

masses appreciated.





Female : deferred





Musculoskeletal: Normal range of motion, no pitting or edema.  No cyanosis.  Te

nderness to lumbar spine at L to L3 with noted paraspinal tenderness.  Positive 

straight leg test on left left hip noted pain with abduction and extension of 

lumbar back at  40 degrees. dtr + 2 bilaterally and equally in BLE. full motor 

and sensory function. normal gait. cap refill < 3 seconds. distal pulses + 2 in 

BLE. lower back examination wnl. No erythema, warmth  to touch, deformity, 

crepitus or obvious asymmetry of the affected leg compared to other of hips 

equally.  muscle strength 5 out of 5 bilateral lower extremities equally. 





NEUROLOGICAL: Cranial nerves grossly intact.  Normal speech, normal gait.  

Normal sensory, motor exams





PSYCH: Normal mood, normal affect.





SKIN: Warm, Dry, normal turgor, no rashes or lesions noted.





Course





- Re-evaluation


Re-evalutation: 





10/17/19 14:29


74-year-old female afebrile vital stable no distress.  CT lumbar spine and CT 

pelvis negative for acute fracture dislocation or bone mass or lesions.  Patient

given prescription for Mobic, take daily, do not take with any other NSAIDs and 

take with food.  Advised to follow-up with Dr. Caballero, patient states she did not

hit her head or change in level consciousness.  Advised to follow-up with Dr. Caballero as well as orthopedic specialist, apply heat 20 minutes on several times a

day, take Tylenol as needed for pain.  After performing a Medical Screening 

Examination, I estimate there is LOW risk for EXPANDING OR RUPTURED ABDOMINAL 

AORTIC ANEURYSM, CAUDA EQUINA SYNDROME, EPIDURAL MASS ABSCESS OR LESION(S), 

OSTEOMYELITIS,PERSONAL HISTORY OF CANCER, IMMUNOSUPPERSSSION, HISTORY OF IV DRUG

USE, FRACTURE, CORD COMPERSSION, CANCER, RETROPERITONEAL BLEED, SPINAL EPIDURAL 

HEMATOMA, or HERNIATED DISK CAUSING SEVERE SPINAL STENOSIS, thus I consider the 

discharge disposition reasonable.  I have reevaluated this patient multiple 

times and no significant life threatening changes are noted. The patient  and I 

have discussed the diagnosis and risks, and we agree with discharging home and 

close follow-up. We also discussed returning to the Emergency Department 

immediately if new or worsening symptoms occur with the understanding that s

ymptoms and presentations can change. We have discussed the symptoms which are 

most concerning (e.g., saddle anesthesia, urinary or bowel incontinence or 

retention, changing or worsening pain) that necessitate immediate return.











- Vital Signs


Vital signs: 


                                        











Temp Pulse Resp BP Pulse Ox


 


 97.5 F   67   18   154/82 H  94 


 


 10/17/19 13:05  10/17/19 13:05  10/17/19 13:05  10/17/19 13:05  10/17/19 13:05














Discharge





- Discharge


Clinical Impression: 


 Fall, Left hip pain, Lower back pain





Condition: Stable


Disposition: HOME, SELF-CARE


Instructions:  Ice & Elevation ([facility]), Low Back Pain ([facility]), Stretching Exercises 

for the Back ([facility])


Additional Instructions: 


You had fallen today and you were worked up to see if there is any bone 

fractures or dislocations as well as a UTI. your CTs were normal as well as 

urinalysis.   Apply heat 20 minutes on 20 minutes off several times a day.  Take

Mobic as directed do not take any other NSAIDs.  Follow-up with your primary 

care provider within the next 24 to 48 hours.





Return immediately for any new or worsening symptoms.





Follow up with primary care provider, call tomorrow to make followup 

appointment.


Prescriptions: 


Meloxicam [Mobic] 7.5 mg PO DAILY #7 tablet


Referrals: 


BISMARK CABALLERO MD [Primary Care Provider] - Follow up as needed


RADHA SOLIMAN MD [ACTIVE PROVISIONAL STAFF] - Follow up as needed

## 2019-10-21 ENCOUNTER — HOSPITAL ENCOUNTER (OUTPATIENT)
Dept: HOSPITAL 62 - RT | Age: 74
End: 2019-10-21
Attending: INTERNAL MEDICINE
Payer: MEDICARE

## 2019-10-21 DIAGNOSIS — C34.12: Primary | ICD-10-CM

## 2019-10-21 PROCEDURE — 94760 N-INVAS EAR/PLS OXIMETRY 1: CPT

## 2019-10-21 PROCEDURE — 94727 GAS DIL/WSHOT DETER LNG VOL: CPT

## 2019-10-21 PROCEDURE — 94729 DIFFUSING CAPACITY: CPT

## 2019-10-21 PROCEDURE — 94060 EVALUATION OF WHEEZING: CPT

## 2019-10-22 NOTE — PULMONARY FUNCTION TEST
Pulmonary Function Test


Date of Procedure:: 10/21/19


INDICATION:: Dyspnea (lung cancer)


Referring Provider: Dr. Jose Ramirez


Technician: Kortney Nye RRT, RCP





- Report


Spirometry: 





Spirometry:


 


pre-FVC:[1.66 L 72%]                                     post-FVC: 1.62 L 70%   

                


 


pre-FEV:1 1.07 L 59%                                  post-FEV1: 1.04 L 57%     

                                                                                

    


 


pre-FEV1/FVC %: 64                         post-FEV1/FVC%: 64                  

predicted: 82                     


 


pre-FEF25-75%: 0.49 L 29%                        post-FEF25-75%: 0.43 L 25%


Lung Volume: 





Total lung capacity:                 2.70 L 68%





Vital capacity:                  1.66 L 72%





Inspiratory capacity:       1.39 L





FRC N2:                               1.31 L 57%





ERV:                                     []








RV:                                      1.03 L 62%








RV/TLC %::                         38    predicted 42


Diffusion Capactity: 





DLCO: 7.6 47%





DLCO/VA: 3.09 89%


Impression: 





Moderate obstructive ventilatory defect.  Confirmed by the decrease flow in FEF 

25-75%.  No significant response to bronchodilator therapy.  This does not 

preclude a clinical trial of bronchodilator therapy.  Mild restrictive 

ventilatory defect.  (Restrictive defect may mask the degree of obstruction.)  

No hyperinflation ;no air trapping.  Severe decrease in diffusion capacity.

## 2019-10-27 ENCOUNTER — HOSPITAL ENCOUNTER (EMERGENCY)
Dept: HOSPITAL 62 - ER | Age: 74
Discharge: HOME | End: 2019-10-27
Payer: MEDICARE

## 2019-10-27 VITALS — DIASTOLIC BLOOD PRESSURE: 79 MMHG | SYSTOLIC BLOOD PRESSURE: 146 MMHG

## 2019-10-27 DIAGNOSIS — I25.10: ICD-10-CM

## 2019-10-27 DIAGNOSIS — I50.9: ICD-10-CM

## 2019-10-27 DIAGNOSIS — I25.2: ICD-10-CM

## 2019-10-27 DIAGNOSIS — Z91.81: ICD-10-CM
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
## 2019-10-27 DIAGNOSIS — S30.1XXA: Primary | ICD-10-CM

## 2019-10-27 DIAGNOSIS — M54.9: ICD-10-CM

## 2019-10-27 DIAGNOSIS — Z95.810: ICD-10-CM

## 2019-10-27 DIAGNOSIS — I11.0: ICD-10-CM

## 2019-10-27 DIAGNOSIS — Z88.0: ICD-10-CM

## 2019-10-27 DIAGNOSIS — R10.32: ICD-10-CM

## 2019-10-27 DIAGNOSIS — S20.212A: ICD-10-CM

## 2019-10-27 LAB
ADD MANUAL DIFF: NO
ALBUMIN SERPL-MCNC: 3.9 G/DL (ref 3.5–5)
ALP SERPL-CCNC: 99 U/L (ref 38–126)
ANION GAP SERPL CALC-SCNC: 9 MMOL/L (ref 5–19)
AST SERPL-CCNC: 19 U/L (ref 14–36)
BASOPHILS # BLD AUTO: 0 10^3/UL (ref 0–0.2)
BASOPHILS NFR BLD AUTO: 0.3 % (ref 0–2)
BILIRUB DIRECT SERPL-MCNC: 0.1 MG/DL (ref 0–0.4)
BILIRUB SERPL-MCNC: 0.7 MG/DL (ref 0.2–1.3)
BUN SERPL-MCNC: 17 MG/DL (ref 7–20)
CALCIUM: 9.3 MG/DL (ref 8.4–10.2)
CHLORIDE SERPL-SCNC: 110 MMOL/L (ref 98–107)
CO2 SERPL-SCNC: 26 MMOL/L (ref 22–30)
EOSINOPHIL # BLD AUTO: 0 10^3/UL (ref 0–0.6)
EOSINOPHIL NFR BLD AUTO: 0.8 % (ref 0–6)
ERYTHROCYTE [DISTWIDTH] IN BLOOD BY AUTOMATED COUNT: 15.6 % (ref 11.5–14)
GLUCOSE SERPL-MCNC: 81 MG/DL (ref 75–110)
HCT VFR BLD CALC: 34.3 % (ref 36–47)
HGB BLD-MCNC: 11.3 G/DL (ref 12–15.5)
LYMPHOCYTES # BLD AUTO: 1 10^3/UL (ref 0.5–4.7)
LYMPHOCYTES NFR BLD AUTO: 36.4 % (ref 13–45)
MCH RBC QN AUTO: 31 PG (ref 27–33.4)
MCHC RBC AUTO-ENTMCNC: 33 G/DL (ref 32–36)
MCV RBC AUTO: 94 FL (ref 80–97)
MONOCYTES # BLD AUTO: 0.2 10^3/UL (ref 0.1–1.4)
MONOCYTES NFR BLD AUTO: 5.6 % (ref 3–13)
NEUTROPHILS # BLD AUTO: 1.5 10^3/UL (ref 1.7–8.2)
NEUTS SEG NFR BLD AUTO: 56.9 % (ref 42–78)
PLATELET # BLD: 203 10^3/UL (ref 150–450)
POTASSIUM SERPL-SCNC: 4.2 MMOL/L (ref 3.6–5)
PROT SERPL-MCNC: 6.6 G/DL (ref 6.3–8.2)
RBC # BLD AUTO: 3.64 10^6/UL (ref 3.72–5.28)
TOTAL CELLS COUNTED % (AUTO): 100 %
WBC # BLD AUTO: 2.7 10^3/UL (ref 4–10.5)

## 2019-10-27 PROCEDURE — 99284 EMERGENCY DEPT VISIT MOD MDM: CPT

## 2019-10-27 PROCEDURE — 80053 COMPREHEN METABOLIC PANEL: CPT

## 2019-10-27 PROCEDURE — 71250 CT THORAX DX C-: CPT

## 2019-10-27 PROCEDURE — 85025 COMPLETE CBC W/AUTO DIFF WBC: CPT

## 2019-10-27 PROCEDURE — 74176 CT ABD & PELVIS W/O CONTRAST: CPT

## 2019-10-27 PROCEDURE — 93005 ELECTROCARDIOGRAM TRACING: CPT

## 2019-10-27 PROCEDURE — 36415 COLL VENOUS BLD VENIPUNCTURE: CPT

## 2019-10-27 PROCEDURE — 93010 ELECTROCARDIOGRAM REPORT: CPT

## 2019-10-27 NOTE — EKG REPORT
SEVERITY:- ABNORMAL ECG -

SINUS RHYTHM

ABNORMAL T, CONSIDER ISCHEMIA, LATERAL LEADS

:

Confirmed by: Mica Hyatt MD 27-Oct-2019 14:39:29

## 2019-10-27 NOTE — ER DOCUMENT REPORT
ED Medical Screen (RME)





- General


Chief Complaint: Back Injury


Stated Complaint: FALL/BACK PAIN


Time Seen by Provider: 10/27/19 11:34


Primary Care Provider: 


MARISOL MOHAN MD [Primary Care Provider] - Follow up as needed


Mode of Arrival: Wheelchair


Information source: Patient


Notes: 





Patient states that she has been falling frequently.  Patient states she last 

fell 2 weeks ago and landed on her side.  Patient reports persistent left flank 

lateral side and left abdominal pain since the fall.  Patient also complains of 

nausea.  Patient denies any urinary symptoms.  Patient saw her primary doctor 

and did have x-rays that did not show any fracture.  Patient states that pain 

seems to be worsening.





I have greeted and performed a rapid initial assessment of this patient.  A 

comprehensive ED assessment and evaluation of the patient, analysis of test 

results and completion of the medical decision making process will be conducted 

by additional ED providers.


TRAVEL OUTSIDE OF THE [place] IN LAST 30 DAYS: No





- Related Data


Allergies/Adverse Reactions: 


                                        





amoxicillin Allergy (Verified 10/27/19 11:32)


   


Penicillins Allergy (Verified 10/27/19 11:32)


   











Past Medical History





- Past Medical History


Cardiac Medical History: Reports: Hx Congestive Heart Failure, Hx Coronary 

Artery Disease, Hx Heart Attack - , Hx Hypercholesterolemia, Hx Hypertension

- ON MEDS


   Denies: Hx Atrial Fibrillation, Hx Peripheral Vascular Disease, Hx Pulmonary 

Embolism, Hx Heart Murmur


Pulmonary Medical History: Reports: Hx Bronchitis, Hx COPD, Hx Pneumonia - YRS 

AGO


   Denies: Hx Asthma, Hx Respiratory Failure, Hx Sleep Apnea, Hx Tuberculosis


Neurological Medical History: Denies: Hx Cerebrovascular Accident, Hx Seizures


Endocrine Medical History: Denies: Hx Diabetes Mellitus Type 1, Hx Diabetes 

Mellitus Type 2


Renal/ Medical History: Reports: Hx Renal Insufficiency.  Denies: Hx End Stage

Renal Disease, Hx Kidney Stones, Hx Peritoneal Dialysis


Malignancy Medical History: Denies: Hx Leukemia, Hx Lung Cancer


GI Medical History: Reports: Hx Crohn's Disease - Patient has either Crohn's 

disease or ulcerative colitis, Hx Diverticulitis, Hx Gastroesophageal Reflux 

Disease.  Denies: Hx Hepatitis, Hx Hiatal Hernia, Hx Irritable Bowel, Hx Liver 

Failure, Hx Pancreatitis, Hx Ulcer


Musculoskeltal Medical History: Reports Hx Arthritis, Denies Hx Fibromyalgia, 

Denies Hx Muscular Dystrophy


Psychiatric Medical History: Reports: Hx Depression


   Denies: Hx Bipolar Disorder, Hx Post Traumatic Stress Disorder, Hx 

Schizophrenia


Traumatic Medical History: Reports: Hx Fractures - right wrist, left foot


Infectious Medical History: Denies: Hx Hepatitis, Hx HIV


Past Surgical History: Reports: Hx Appendectomy, Hx Cardiac Catheterization, Hx 

Cardiac Surgery - AICD for ventricular fibrillation, Hx  Section - x4, 

Hx Coronary Stent, Hx Hysterectomy, Hx Orthopedic Surgery - bilateral knee 

surgery, Hx Pacemaker, Hx Tonsillectomy.  Denies: Hx Bowel Surgery, Hx 

Cholecystectomy, Hx Colostomy, Hx Coronary Artery Bypass Graft, Hx Gastric 

Bypass Surgery, Hx Herniorrhaphy, Hx Mastectomy, Hx Open Heart Surgery, Hx Tubal

Ligation





- Immunizations


Immunizations up to date: Yes


Hx Diphtheria, Pertussis, Tetanus Vaccination: Yes





Physical Exam





- Vital signs


Vitals: 





                                        











Temp Pulse Resp BP Pulse Ox


 


 98.0 F   88   16   150/62 H  96 


 


 10/27/19 11:30  10/27/19 11:30  10/27/19 11:30  10/27/19 11:30  10/27/19 11:30














- General


General appearance: Alert


Notes: 





Left lateral side, left side abdominal tenderness





Course





- Vital Signs


Vital signs: 





                                        











Temp Pulse Resp BP Pulse Ox


 


 98.0 F   88   16   150/62 H  96 


 


 10/27/19 11:30  10/27/19 11:30  10/27/19 11:30  10/27/19 11:30  10/27/19 11:30














Doctor's Discharge





- Discharge


Referrals: 


MARISOL MOHAN MD [Primary Care Provider] - Follow up as needed

## 2019-10-27 NOTE — ER DOCUMENT REPORT
ED General





- General


Chief Complaint: Low Back Pain


Stated Complaint: FALL/BACK PAIN


Time Seen by Provider: 10/27/19 11:34


Primary Care Provider: 


MARISOL MOHAN MD [ACTIVE STAFF] - Follow up as needed


Mode of Arrival: Wheelchair


Notes: 





HPI:  








ROS:  See HPI


All other review of systems reviewed and otherwise negative





Reviewed vital signs and nursing note as charted by RN.





PHYSICAL EXAM: 





CONSTITUTIONAL: Alert and oriented and responds appropriately to questions. 

Well-appearing; well-nourished





HEAD: Normocephalic; atraumatic





EYES: PERRL; Conjunctivae clear, sclerae non-icteric





ENT: Normal nose; no rhinorrhea; moist mucous membranes; pharynx without lesions

noted





NECK: Supple without meningismus; non-tender; no cervical lymphadenopathy, no 

masses





CARD: Regular rate and rhythm; no murmurs; symmetric distal pulses





RESP: Normal chest excursion without splinting or tachypnea; breath sounds clear

and equal bilaterally; no wheezes, no rhonchi, no rales





ABD/GI: Normal bowel sounds; non-distended; soft, non-tender; no palpable 

organomegaly or masses





BACK: The back appears normal and is non-tender to palpation





EXT: Normal ROM in all joints; non-tender to palpation; no edema





SKIN: No acute lesions noted





NEURO: CN 2-12 intact; 5/5 bilateral upper and lower extremity strength with 

sensation intact to light touch





PSYCH: The patient's mood and manner are appropriate. Grooming and personal 

hygiene are appropriate.


TRAVEL OUTSIDE OF THE [place] IN LAST 30 DAYS: No





- Related Data


Allergies/Adverse Reactions: 


                                        





amoxicillin Allergy (Verified 10/27/19 11:32)


   


Penicillins Allergy (Verified 10/27/19 11:32)


   











Past Medical History





- General


Information source: Patient





- Social History


Smoking Status: Former Smoker


Family History: Reviewed & Not Pertinent, Hypertension


Patient has suicidal ideation: No


Patient has homicidal ideation: No





- Past Medical History


Cardiac Medical History: Reports: Hx Congestive Heart Failure, Hx Coronary 

Artery Disease, Hx Heart Attack - , Hx Hypercholesterolemia, Hx Hypertension

- ON MEDS


   Denies: Hx Atrial Fibrillation, Hx Peripheral Vascular Disease, Hx Pulmonary 

Embolism, Hx Heart Murmur


Pulmonary Medical History: Reports: Hx Bronchitis, Hx COPD, Hx Pneumonia - YRS 

AGO


   Denies: Hx Asthma, Hx Respiratory Failure, Hx Sleep Apnea, Hx Tuberculosis


Neurological Medical History: Denies: Hx Cerebrovascular Accident, Hx Seizures


Endocrine Medical History: Denies: Hx Diabetes Mellitus Type 1, Hx Diabetes 

Mellitus Type 2


Renal/ Medical History: Reports: Hx Renal Insufficiency.  Denies: Hx End Stage

Renal Disease, Hx Kidney Stones, Hx Peritoneal Dialysis


Malignancy Medical History: Denies: Hx Leukemia, Hx Lung Cancer


GI Medical History: Reports: Hx Crohn's Disease - Patient has either Crohn's 

disease or ulcerative colitis, Hx Diverticulitis, Hx Gastroesophageal Reflux 

Disease.  Denies: Hx Hepatitis, Hx Hiatal Hernia, Hx Irritable Bowel, Hx Liver 

Failure, Hx Pancreatitis, Hx Ulcer


Musculoskeletal Medical History: Reports Hx Arthritis, Denies Hx Fibromyalgia, 

Denies Hx Muscular Dystrophy


Psychiatric Medical History: Reports: Hx Depression


   Denies: Hx Bipolar Disorder, Hx Post Traumatic Stress Disorder, Hx 

Schizophrenia


Traumatic Medical History: Reports: Hx Fractures - right wrist, left foot


Infectious Medical History: Denies: Hx Hepatitis, Hx HIV


Past Surgical History: Reports: Hx Appendectomy, Hx Cardiac Catheterization, Hx 

Cardiac Surgery - AICD for ventricular fibrillation, Hx  Section - x4, 

Hx Coronary Stent, Hx Hysterectomy, Hx Orthopedic Surgery - bilateral knee 

surgery, Hx Pacemaker, Hx Tonsillectomy.  Denies: Hx Bowel Surgery, Hx Cho

lecystectomy, Hx Colostomy, Hx Coronary Artery Bypass Graft, Hx Gastric Bypass 

Surgery, Hx Herniorrhaphy, Hx Mastectomy, Hx Open Heart Surgery, Hx Tubal 

Ligation





- Immunizations


Immunizations up to date: Yes


Hx Diphtheria, Pertussis, Tetanus Vaccination: Yes


Hx Pneumococcal Vaccination: 18





Physical Exam





- Vital signs


Vitals: 





                                        











Temp Pulse Resp BP Pulse Ox


 


 98.0 F   88   16   150/62 H  96 


 


 10/27/19 11:30  10/27/19 11:30  10/27/19 11:30  10/27/19 11:30  10/27/19 11:30














Course





- Re-evaluation


Re-evalutation: 





10/27/19 12:14


EKG shows a heart of 62, normal sinus rhythm, inverted T waves with some mild 

depression in leads I, aVL, V4 through V6.  Inverted T waves in V3.  Compared to

the previous EKG in January, no appreciable change.





- Vital Signs


Vital signs: 





                                        











Temp Pulse Resp BP Pulse Ox


 


 98.0 F   88   16   150/62 H  96 


 


 10/27/19 11:30  10/27/19 11:30  10/27/19 11:30  10/27/19 11:30  10/27/19 11:30














Discharge





- Discharge


Referrals: 


MARISOL MOHAN MD [ACTIVE STAFF] - Follow up as needed

## 2019-10-27 NOTE — ER DOCUMENT REPORT
ED General





- General


Chief Complaint: Low Back Pain


Stated Complaint: FALL/BACK PAIN


Time Seen by Provider: 10/27/19 11:34


Primary Care Provider: 


MARISOL MOHAN MD [ACTIVE STAFF] - Follow up as needed


Mode of Arrival: Wheelchair


Notes: 





HPI: 74-year-old female who states 1 week ago she fell while attempting to let 

her dog out.  She states she had no lightheadedness, dizziness, chest pain, 

palpitations, weakness or numbness because of the fall.  She landed on the left 

side.  She states she has had some chronic increasing pain to the left lower 

lateral ribs as well as the left lower quadrant of her abdomen.  She denies any 

pelvis pain.  She states she did not hit her head.  She denies any vomiting, 

fevers, weakness or numbness.








ROS:  See HPI


All other review of systems reviewed and otherwise negative





Reviewed vital signs and nursing note as charted by RN.





PHYSICAL EXAM: 





CONSTITUTIONAL: Alert and oriented and responds appropriately to questions. 

Well-appearing; well-nourished





HEAD: Normocephalic; atraumatic





EYES: PERRL; Conjunctivae clear, sclerae non-icteric





ENT: Normal nose; no rhinorrhea; moist mucous membranes; pharynx without lesions

noted





NECK: Supple without meningismus; non-tender





CARD: Regular rate and rhythm; no murmurs; symmetric distal pulses





RESP: Normal chest excursion without splinting or tachypnea; breath sounds clear

and equal bilaterally; tenderness to palpation of the left lower posterior ribs 

without any obvious swelling, erythema, or crepitus





ABD/GI: Normal bowel sounds; non-distended; soft, mild tenderness to palpation 

of the left lower quadrant without rebound or guarding.  Patient does have an 

old surgical scar consistent with an intra-abdominal pacemaker that has recently

been moved to the left chest 





BACK: The back appears normal and is non-tender to palpation





EXT: Normal ROM in all joints; non-tender to palpation; no edema





SKIN: No acute lesions noted





NEURO: CN 2-12 intact; 5/5 bilateral upper and lower extremity strength with 

sensation intact to light touch





PSYCH: The patient's mood and manner are appropriate. Grooming and personal 

hygiene are appropriate.


TRAVEL OUTSIDE OF THE [place] IN LAST 30 DAYS: No





- Related Data


Allergies/Adverse Reactions: 


                                        





amoxicillin Allergy (Verified 10/27/19 11:32)


   


Penicillins Allergy (Verified 10/27/19 11:32)


   











Past Medical History





- General


Information source: Patient





- Social History


Smoking Status: Former Smoker


Family History: Reviewed & Not Pertinent, Hypertension


Patient has suicidal ideation: No


Patient has homicidal ideation: No





- Past Medical History


Cardiac Medical History: Reports: Hx Congestive Heart Failure, Hx Coronary 

Artery Disease, Hx Heart Attack - , Hx Hypercholesterolemia, Hx Hypertension

- ON MEDS


   Denies: Hx Atrial Fibrillation, Hx Peripheral Vascular Disease, Hx Pulmonary 

Embolism, Hx Heart Murmur


Pulmonary Medical History: Reports: Hx Bronchitis, Hx COPD, Hx Pneumonia - YRS 

AGO


   Denies: Hx Asthma, Hx Respiratory Failure, Hx Sleep Apnea, Hx Tuberculosis


Neurological Medical History: Denies: Hx Cerebrovascular Accident, Hx Seizures


Endocrine Medical History: Denies: Hx Diabetes Mellitus Type 1, Hx Diabetes 

Mellitus Type 2


Renal/ Medical History: Reports: Hx Renal Insufficiency.  Denies: Hx End Stage

Renal Disease, Hx Kidney Stones, Hx Peritoneal Dialysis


Malignancy Medical History: Denies: Hx Leukemia, Hx Lung Cancer


GI Medical History: Reports: Hx Crohn's Disease - Patient has either Crohn's 

disease or ulcerative colitis, Hx Diverticulitis, Hx Gastroesophageal Reflux 

Disease.  Denies: Hx Hepatitis, Hx Hiatal Hernia, Hx Irritable Bowel, Hx Liver 

Failure, Hx Pancreatitis, Hx Ulcer


Musculoskeletal Medical History: Reports Hx Arthritis, Denies Hx Fibromyalgia, 

Denies Hx Muscular Dystrophy


Psychiatric Medical History: Reports: Hx Depression


   Denies: Hx Bipolar Disorder, Hx Post Traumatic Stress Disorder, Hx 

Schizophrenia


Traumatic Medical History: Reports: Hx Fractures - right wrist, left foot


Infectious Medical History: Denies: Hx Hepatitis, Hx HIV


Past Surgical History: Reports: Hx Appendectomy, Hx Cardiac Catheterization, Hx 

Cardiac Surgery - AICD for ventricular fibrillation, Hx  Section - x4, 

Hx Coronary Stent, Hx Hysterectomy, Hx Orthopedic Surgery - bilateral knee 

surgery, Hx Pacemaker, Hx Tonsillectomy.  Denies: Hx Bowel Surgery, Hx Chol

ecystectomy, Hx Colostomy, Hx Coronary Artery Bypass Graft, Hx Gastric Bypass 

Surgery, Hx Herniorrhaphy, Hx Mastectomy, Hx Open Heart Surgery, Hx Tubal 

Ligation





- Immunizations


Immunizations up to date: Yes


Hx Diphtheria, Pertussis, Tetanus Vaccination: Yes


Hx Pneumococcal Vaccination: 18





Physical Exam





- Vital signs


Vitals: 


                                        











Temp Pulse Resp BP Pulse Ox


 


 98.0 F   88   16   150/62 H  96 


 


 10/27/19 11:30  10/27/19 11:30  10/27/19 11:30  10/27/19 11:30  10/27/19 11:30














Course





- Re-evaluation


Re-evalutation: 





10/27/19 12:38


Given the above history and physical examination I will order basic labs, EKG, 

CT scan of the chest abdomen and pelvis without contrast to evaluate the 

possibility of multiple fractures in this 74-year-old female as well as a 

retroperitoneal bleed or intra-abdominal pathology.





10/27/19 14:12


CT scan of the abdomen and pelvis as recorded.  X-ray of the chest shows no 

obvious rib fractures.  Vital signs are stable.  White blood cell count is low 

slightly below baseline.  Patient is afebrile and not neutropenic.  Cancer 

history as recorded.  Given the above history and physical, patient will be 

discharged home with strict return precautions and a short course of pain 

medications.





- Vital Signs


Vital signs: 


                                        











Temp Pulse Resp BP Pulse Ox


 


 98.0 F   88   16   150/62 H  96 


 


 10/27/19 11:30  10/27/19 11:30  10/27/19 11:30  10/27/19 11:30  10/27/19 11:30














- Laboratory


Result Diagrams: 


                                 10/27/19 12:48





                                 10/27/19 12:48


Laboratory results interpreted by me: 


                                        











  10/27/19 10/27/19





  12:48 12:48


 


WBC  2.7 L 


 


RBC  3.64 L 


 


Hgb  11.3 L 


 


Hct  34.3 L 


 


RDW  15.6 H 


 


Absolute Neuts (auto)  1.5 L 


 


Chloride   110 H


 


Est GFR (MDRD) Non-Af   52 L














Discharge





- Discharge


Clinical Impression: 


Accidental fall


Qualifiers:


 Encounter type: initial encounter Qualified Code(s): W19.XXXA - Unspecified 

fall, initial encounter





Abdominal contusion


Qualifiers:


 Encounter type: initial encounter Qualified Code(s): S30.1XXA - Contusion of 

abdominal wall, initial encounter





Contusion of rib on left side


Qualifiers:


 Encounter type: initial encounter Qualified Code(s): S20.212A - Contusion of 

left front wall of thorax, initial encounter





Condition: Good


Disposition: HOME, SELF-CARE


Additional Instructions: 


Come back immediately for any increased pain, weakness or numbness, shortness of

breath, fevers or vomiting, or any other acute problems.  Please follow-up with 

your primary care physician and specialist as discussed.


Prescriptions: 


Hydrocodone/Acetaminophen [Norco 5-325 mg Tablet] 1 tab PO Q8 #12 tablet


Referrals: 


MARISOL MOHAN MD [ACTIVE STAFF] - Follow up as needed

## 2019-10-27 NOTE — RADIOLOGY REPORT (SQ)
EXAM DESCRIPTION:  CT CHEST WITHOUT; CT ABD/PELVIS NO ORAL OR IV



COMPLETED DATE/TIME:  10/27/2019 1:23 pm



REASON FOR STUDY:  13; left lower lateral rib pain; 13; fall left lower abd



COMPARISON:  CT chest, 8/19/2019, CT pelvis, 10/17/2019, PET-CT, 10/13/2019



TECHNIQUE:  CT scan of the chest performed without intravenous contrast using helical scanning techni
que.  Images reviewed with lung, soft tissue and bone windows. Reconstructed coronal and sagittal MPR
 images reviewed.  All images stored on PACS.

CT scan of the abdomen and pelvis performed without intravenous contrast and withoutoral contrast usi
ng helical scanning technique with dynamic intravenous contrast injection.  Images reviewed with lung
, soft tissue and bone windows.  Reconstructed coronal and sagittal MPR images reviewed.  All images 
stored on PACS.

All CT scanners at this facility use dose modulation, iterative reconstruction, and/or weight based d
osing when appropriate to reduce radiation dose to as low as reasonably achievable (ALARA).

[facility]: Dose Right  [facility]: CareDose    [facility]: Dose Right    [facility]: Teradose 4D    [facility]: Smart Technologies



RADIATION DOSE:  354 mGy cm



LIMITATIONS:  No technical limitations.



FINDINGS:  CHEST:

AXILLAE: No adenopathy.

CHEST WALL: No masses.  No subcutaneous air.

LUNGS: Emphysema.  Redemonstrated 2.6 cm nodule of the left upper lobe with adjacent fiducial markers
.

PLEURA: No effusions.  No calcifications.

THYROID: No masses or significant asymmetry.

HILAR AND MEDIASTINAL STRUCTURES: No identified masses or abnormal nodes.

AORTA AND GREAT VESSELS: No aneurysm.  Calcific atherosclerosis.

HEART: No pericardial effusion.  Cardiomegaly with extensive 3 vessel coronary artery calcifications 
and/or stents with left chest multi lead pacer defibrillator.

HARDWARE AND LIFELINES: None.

BONES: No significant finding.

OTHER: No other significant finding.

ABDOMEN AND PELVIS:

LIVER: Normal size. No masses.  No dilated ducts.

SPLEEN: Normal size.  No focal lesions.

PANCREAS: No masses.  No significant calcifications.  No adjacent inflammation or peripancreatic flui
d collections.  Pancreatic duct not dilated.

GALLBLADDER: No identified stones by CT criteria. No inflammatory changes to suggest cholecystitis.

ADRENAL GLANDS: No significant masses or asymmetry.

RIGHT KIDNEY AND URETER: No solid masses.  Large exophytic inferior pole cyst.  Assessment limited by
 lack of IV contrast. No significant calcification. No hydronephrosis or hydroureter.

LEFT KIDNEY AND URETER: No solid masses. Assessment limited by lack of IV contrast.  Nonobstructive i
nferior pole calculus.  No hydronephrosis or hydroureter.

AORTA AND VESSELS: No aneurysm.  Calcific atherosclerosis.

RETROPERITONEUM: No retroperitoneal adenopathy, hemorrhage or masses.

APPENDIX: Normal.

LARGE AND SMALL BOWEL: No dilatation.  No masses.  No wall thickening.

ABDOMINAL WALL: Abandoned anterior abdominal pacer pocket with abandoned epicardial leads.

PERITONEAL CAVITY: No free air.  No free fluid.  No peritoneal implants or masses.

PELVIS: No mass or free fluid.  Status posthysterectomy.  Normal bladder.

BONES: No significant or acute findings.

OTHER: No other significant finding.



IMPRESSION:  1.  No noncontrast CT evidence of acute traumatic injury to the chest, abdomen, or pelvi
s.  No displaced fractures.  Please note that lack of intravenous contrast significantly limits evalu
ation for solid organ injury and in general, intravenous contrast is strongly indicated in the settin
g of trauma.

2.  Redemonstrated left upper lobe nodule, in keeping with findings of prior PET-CT.

3.  Additional chronic findings as detailed above.



TECHNICAL DOCUMENTATION:  JOB ID:  [id number]

Quality ID # 436: Final reports with documentation of one or more dose reduction techniques (e.g., Au
tomated exposure control, adjustment of the mA and/or kV according to patient size, use of iterative 
reconstruction technique)

 2011 [employer]- All Rights Reserved



Reading location - IP/workstation name: [email or web address]

## 2019-10-27 NOTE — RADIOLOGY REPORT (SQ)
EXAM DESCRIPTION:  CT CHEST WITHOUT; CT ABD/PELVIS NO ORAL OR IV



COMPLETED DATE/TIME:  10/27/2019 1:23 pm



REASON FOR STUDY:  13; left lower lateral rib pain; 13; fall left lower abd



COMPARISON:  CT chest, 8/19/2019, CT pelvis, 10/17/2019, PET-CT, 10/13/2019



TECHNIQUE:  CT scan of the chest performed without intravenous contrast using helical scanning techni
que.  Images reviewed with lung, soft tissue and bone windows. Reconstructed coronal and sagittal MPR
 images reviewed.  All images stored on PACS.

CT scan of the abdomen and pelvis performed without intravenous contrast and withoutoral contrast usi
ng helical scanning technique with dynamic intravenous contrast injection.  Images reviewed with lung
, soft tissue and bone windows.  Reconstructed coronal and sagittal MPR images reviewed.  All images 
stored on PACS.

All CT scanners at this facility use dose modulation, iterative reconstruction, and/or weight based d
osing when appropriate to reduce radiation dose to as low as reasonably achievable (ALARA).

[facility]: Dose Right  [facility]: CareDose    [facility]: Dose Right    [facility]: Teradose 4D    [facility]: Smart Technologies



RADIATION DOSE:  354 mGy cm



LIMITATIONS:  No technical limitations.



FINDINGS:  CHEST:

AXILLAE: No adenopathy.

CHEST WALL: No masses.  No subcutaneous air.

LUNGS: Emphysema.  Redemonstrated 2.6 cm nodule of the left upper lobe with adjacent fiducial markers
.

PLEURA: No effusions.  No calcifications.

THYROID: No masses or significant asymmetry.

HILAR AND MEDIASTINAL STRUCTURES: No identified masses or abnormal nodes.

AORTA AND GREAT VESSELS: No aneurysm.  Calcific atherosclerosis.

HEART: No pericardial effusion.  Cardiomegaly with extensive 3 vessel coronary artery calcifications 
and/or stents with left chest multi lead pacer defibrillator.

HARDWARE AND LIFELINES: None.

BONES: No significant finding.

OTHER: No other significant finding.

ABDOMEN AND PELVIS:

LIVER: Normal size. No masses.  No dilated ducts.

SPLEEN: Normal size.  No focal lesions.

PANCREAS: No masses.  No significant calcifications.  No adjacent inflammation or peripancreatic flui
d collections.  Pancreatic duct not dilated.

GALLBLADDER: No identified stones by CT criteria. No inflammatory changes to suggest cholecystitis.

ADRENAL GLANDS: No significant masses or asymmetry.

RIGHT KIDNEY AND URETER: No solid masses.  Large exophytic inferior pole cyst.  Assessment limited by
 lack of IV contrast. No significant calcification. No hydronephrosis or hydroureter.

LEFT KIDNEY AND URETER: No solid masses. Assessment limited by lack of IV contrast.  Nonobstructive i
nferior pole calculus.  No hydronephrosis or hydroureter.

AORTA AND VESSELS: No aneurysm.  Calcific atherosclerosis.

RETROPERITONEUM: No retroperitoneal adenopathy, hemorrhage or masses.

APPENDIX: Normal.

LARGE AND SMALL BOWEL: No dilatation.  No masses.  No wall thickening.

ABDOMINAL WALL: Abandoned anterior abdominal pacer pocket with abandoned epicardial leads.

PERITONEAL CAVITY: No free air.  No free fluid.  No peritoneal implants or masses.

PELVIS: No mass or free fluid.  Status posthysterectomy.  Normal bladder.

BONES: No significant or acute findings.

OTHER: No other significant finding.



IMPRESSION:  1.  No noncontrast CT evidence of acute traumatic injury to the chest, abdomen, or pelvi
s.  No displaced fractures.  Please note that lack of intravenous contrast significantly limits evalu
ation for solid organ injury and in general, intravenous contrast is strongly indicated in the settin
g of trauma.

2.  Redemonstrated left upper lobe nodule, in keeping with findings of prior PET-CT.

3.  Additional chronic findings as detailed above.



TECHNICAL DOCUMENTATION:  JOB ID:  [id number]

Quality ID # 436: Final reports with documentation of one or more dose reduction techniques (e.g., Au
tomated exposure control, adjustment of the mA and/or kV according to patient size, use of iterative 
reconstruction technique)

 2011 [employer]- All Rights Reserved



Reading location - IP/workstation name: [email or web address]

## 2019-10-28 ENCOUNTER — HOSPITAL ENCOUNTER (OUTPATIENT)
Dept: HOSPITAL 62 - RAD | Age: 74
End: 2019-10-28
Attending: INTERNAL MEDICINE
Payer: MEDICARE

## 2019-10-28 DIAGNOSIS — R91.1: ICD-10-CM

## 2019-10-28 DIAGNOSIS — C34.12: Primary | ICD-10-CM

## 2019-10-28 PROCEDURE — 78582 LUNG VENTILAT&PERFUS IMAGING: CPT

## 2019-10-28 PROCEDURE — A9567 TECHNETIUM TC-99M AEROSOL: HCPCS

## 2019-10-28 PROCEDURE — 71046 X-RAY EXAM CHEST 2 VIEWS: CPT

## 2019-10-28 PROCEDURE — A9540 TC99M MAA: HCPCS

## 2019-10-28 NOTE — RADIOLOGY REPORT (SQ)
EXAM DESCRIPTION:  CHEST 2 VIEWS



COMPLETED DATE/TIME:  10/28/2019 10:30 am



REASON FOR STUDY:  (C34.12)MALIGNANT NEOPLASM OF LOWER LOBE, RIGHT BRONCHUS OR LUNG



COMPARISON:  None.



EXAM PARAMETERS:  NUMBER OF VIEWS: two views

TECHNIQUE: Digital Frontal and Lateral radiographic views of the chest acquired.

RADIATION DOSE: NA

LIMITATIONS: none



FINDINGS:  LUNGS AND PLEURA: No opacities, masses or pneumothorax. No pleural effusion.

MEDIASTINUM AND HILAR STRUCTURES: No masses or contour abnormalities.

HEART AND VASCULAR STRUCTURES: Heart normal size.  No evidence for failure.

BONES: No acute findings.

HARDWARE: Pacemaker/defibrillator.

OTHER: No other significant finding.



IMPRESSION:  NO ACUTE RADIOGRAPHIC FINDING IN THE CHEST.



TECHNICAL DOCUMENTATION:  JOB ID:  [id number]

 2011 [employer]- All Rights Reserved



Reading location - IP/workstation name: [email or web address]

## 2019-10-28 NOTE — RADIOLOGY REPORT (SQ)
EXAM DESCRIPTION:  NM LUNG VENT/PERF SCAN



COMPLETED DATE/TIME:  10/28/2019 11:31 am



REASON FOR STUDY:  (C34.12) LUNG CA, LUNG RESECTION , QUANT LUNG PER DR. MOHAN C34.12  MALIGNANT NE
OPLASM OF UPPER LOBE, LEFT BRONCHUS OR KARRIE R91.1  SOLITARY PULMONARY NODULE



COMPARISON:  None.



RADIONUCLIDE AND DOSE:  5 millicuries TC-99m MAA  Intravenous

30 millicuries TC-99m DTPA Inhaled aerosol



TECHNIQUE:  Eight views of the lungs acquired post ventilation of DTPA aerosol.  Eight matching views
 of the lungs acquired following injection of MAA.



LIMITATIONS:  None.



FINDINGS:  VENTILATION: There is some clumping of activity with suboptimal distribution.

PERFUSION: Perfusion images with normal homogenous activity and no wedge-shaped or segmental defects.
  No ventilation-perfusion mismatches.

OTHER: Ventilation and perfusion analysis was performed for quantification purposes.  Perfusion shows
 mean perfusion on the left of 29.3% 9 on the right of 70.7%.  Ventilation shows mean activity on the
 left of 40% hand on the right of 60%.



IMPRESSION:  There is no evidence of pulmonary emboli.  Ventilation and perfusion analysis as describ
ed.  There is sub optimal distribution of activity on the ventilation scan.



TECHNICAL DOCUMENTATION:  JOB ID:  [id number]

 2011 [employer]- All Rights Reserved



Reading location - IP/workstation name: [email or web address]

## 2020-03-27 ENCOUNTER — HOSPITAL ENCOUNTER (OUTPATIENT)
Dept: HOSPITAL 62 - OD | Age: 75
End: 2020-03-27
Attending: FAMILY MEDICINE
Payer: MEDICARE

## 2020-03-27 DIAGNOSIS — R07.9: Primary | ICD-10-CM

## 2020-03-27 DIAGNOSIS — I25.10: ICD-10-CM

## 2020-03-27 LAB
ADD MANUAL DIFF: NO
ANION GAP SERPL CALC-SCNC: 8 MMOL/L (ref 5–19)
BASOPHILS # BLD AUTO: 0 10^3/UL (ref 0–0.2)
BASOPHILS NFR BLD AUTO: 0.1 % (ref 0–2)
BUN SERPL-MCNC: 15 MG/DL (ref 7–20)
CALCIUM: 9.6 MG/DL (ref 8.4–10.2)
CHLORIDE SERPL-SCNC: 106 MMOL/L (ref 98–107)
CK SERPL-CCNC: 47 U/L (ref 30–135)
CO2 SERPL-SCNC: 22 MMOL/L (ref 22–30)
EOSINOPHIL # BLD AUTO: 0 10^3/UL (ref 0–0.6)
EOSINOPHIL NFR BLD AUTO: 1.8 % (ref 0–6)
ERYTHROCYTE [DISTWIDTH] IN BLOOD BY AUTOMATED COUNT: 15.2 % (ref 11.5–14)
GLUCOSE SERPL-MCNC: 102 MG/DL (ref 75–110)
HCT VFR BLD CALC: 33.8 % (ref 36–47)
HGB BLD-MCNC: 11.6 G/DL (ref 12–15.5)
LYMPHOCYTES # BLD AUTO: 0.8 10^3/UL (ref 0.5–4.7)
LYMPHOCYTES NFR BLD AUTO: 34.8 % (ref 13–45)
MCH RBC QN AUTO: 31.7 PG (ref 27–33.4)
MCHC RBC AUTO-ENTMCNC: 34.3 G/DL (ref 32–36)
MCV RBC AUTO: 92 FL (ref 80–97)
MONOCYTES # BLD AUTO: 0.1 10^3/UL (ref 0.1–1.4)
MONOCYTES NFR BLD AUTO: 5.9 % (ref 3–13)
NEUTROPHILS # BLD AUTO: 1.3 10^3/UL (ref 1.7–8.2)
NEUTS SEG NFR BLD AUTO: 57.4 % (ref 42–78)
PLATELET # BLD: 231 10^3/UL (ref 150–450)
POTASSIUM SERPL-SCNC: 4.5 MMOL/L (ref 3.6–5)
RBC # BLD AUTO: 3.66 10^6/UL (ref 3.72–5.28)
TOTAL CELLS COUNTED % (AUTO): 100 %
WBC # BLD AUTO: 2.3 10^3/UL (ref 4–10.5)

## 2020-03-27 PROCEDURE — 93010 ELECTROCARDIOGRAM REPORT: CPT

## 2020-03-27 PROCEDURE — 36415 COLL VENOUS BLD VENIPUNCTURE: CPT

## 2020-03-27 PROCEDURE — 84484 ASSAY OF TROPONIN QUANT: CPT

## 2020-03-27 PROCEDURE — 80048 BASIC METABOLIC PNL TOTAL CA: CPT

## 2020-03-27 PROCEDURE — 82550 ASSAY OF CK (CPK): CPT

## 2020-03-27 PROCEDURE — 85025 COMPLETE CBC W/AUTO DIFF WBC: CPT

## 2020-03-27 PROCEDURE — 71046 X-RAY EXAM CHEST 2 VIEWS: CPT

## 2020-03-27 PROCEDURE — 93005 ELECTROCARDIOGRAM TRACING: CPT

## 2020-03-27 NOTE — EKG REPORT
SEVERITY:- ABNORMAL ECG -

SINUS RHYTHM

ABNORMAL T, CONSIDER ISCHEMIA, ANT-LAT LEADS

:

Confirmed by: Salbador Benjamin MD 27-Mar-2020 19:19:58

## 2020-03-27 NOTE — RADIOLOGY REPORT (SQ)
EXAM DESCRIPTION:  CHEST PA/LATERAL



IMAGES COMPLETED DATE/TIME:  3/27/2020 2:54 pm



REASON FOR STUDY:  CHEST PAIN



COMPARISON:  10/28/2019



EXAM PARAMETERS:  NUMBER OF VIEWS: two views

TECHNIQUE: Digital Frontal and Lateral radiographic views of the chest acquired.

RADIATION DOSE: NA

LIMITATIONS: none



FINDINGS:  LUNGS AND PLEURA: The left perihilar nodule is smaller in size.  There is linear opacifica
tion now on this area which could represent scar or atelectasis.  Lung fields are otherwise clear.  N
o pneumothorax.

MEDIASTINUM AND HILAR STRUCTURES: No masses or contour abnormalities.

HEART AND VASCULAR STRUCTURES: Heart normal size.  No evidence for failure.

BONES: No acute findings.

HARDWARE: Battery pack and leads remain in place.

OTHER: No other significant finding.



IMPRESSION:  The left perihilar mass is significantly smaller in size.  Linear opacification is fair 
could represent scar or atelectasis.



TECHNICAL DOCUMENTATION:  JOB ID:  [id number]

 2011 [employer]- All Rights Reserved



Reading location - IP/workstation name: [email or web address]

## 2020-04-20 ENCOUNTER — HOSPITAL ENCOUNTER (OUTPATIENT)
Dept: HOSPITAL 62 - OD | Age: 75
End: 2020-04-20
Attending: FAMILY MEDICINE
Payer: MEDICARE

## 2020-04-20 DIAGNOSIS — M25.571: Primary | ICD-10-CM

## 2020-04-20 NOTE — RADIOLOGY REPORT (SQ)
EXAM DESCRIPTION:  ANKLE RIGHT COMPLETE



IMAGES COMPLETED DATE/TIME:  4/20/2020 1:28 pm



REASON FOR STUDY:  PAIN IN RIGHT ANKLE AND JOINTS OF RIGHT FOOT M25.571  PAIN IN RIGHT ANKLE AND JOIN
TS OF RIGHT FOOT



COMPARISON:  None.



NUMBER OF VIEWS:  Three views.



TECHNIQUE:  AP, lateral, and oblique radiographic images acquired of the right ankle.



LIMITATIONS:  Patient positioning.



FINDINGS:  MINERALIZATION: Decreased.

BONES: No definite acute bony abnormality.  Degenerative changes about the ankle and inferior to the 
medial and lateral malleolus.

JOINTS: No dislocation.  talar dome intact.

SOFT TISSUES: Vascular calcifications.  Lower extremity edema.

OTHER: No other significant finding.



IMPRESSION:  1.  Limited evaluation secondary to patient positioning.

2.  Degenerative changes about the hindfoot without definite acute bony abnormality.



TECHNICAL DOCUMENTATION:  JOB ID:  [id number]

 2011 [employer]- All Rights Reserved



Reading location - IP/workstation name: [email or web address]

## 2020-05-06 ENCOUNTER — HOSPITAL ENCOUNTER (OUTPATIENT)
Dept: HOSPITAL 62 - RDC | Age: 75
End: 2020-05-06
Attending: NURSE PRACTITIONER
Payer: MEDICARE

## 2020-05-06 ENCOUNTER — HOSPITAL ENCOUNTER (OUTPATIENT)
Dept: HOSPITAL 62 - OD | Age: 75
End: 2020-05-06
Attending: FAMILY MEDICINE
Payer: MEDICARE

## 2020-05-06 VITALS — SYSTOLIC BLOOD PRESSURE: 138 MMHG | DIASTOLIC BLOOD PRESSURE: 65 MMHG

## 2020-05-06 DIAGNOSIS — R05: Primary | ICD-10-CM

## 2020-05-06 DIAGNOSIS — K21.9: ICD-10-CM

## 2020-05-06 DIAGNOSIS — N28.9: ICD-10-CM

## 2020-05-06 DIAGNOSIS — Z88.1: ICD-10-CM

## 2020-05-06 DIAGNOSIS — Z88.0: ICD-10-CM

## 2020-05-06 DIAGNOSIS — Z86.79: ICD-10-CM

## 2020-05-06 DIAGNOSIS — I25.2: ICD-10-CM

## 2020-05-06 DIAGNOSIS — R51: ICD-10-CM

## 2020-05-06 DIAGNOSIS — E78.00: ICD-10-CM

## 2020-05-06 DIAGNOSIS — R06.02: ICD-10-CM

## 2020-05-06 DIAGNOSIS — R50.9: ICD-10-CM

## 2020-05-06 DIAGNOSIS — J44.9: ICD-10-CM

## 2020-05-06 DIAGNOSIS — I51.7: ICD-10-CM

## 2020-05-06 DIAGNOSIS — R05: ICD-10-CM

## 2020-05-06 DIAGNOSIS — Z20.828: Primary | ICD-10-CM

## 2020-05-06 DIAGNOSIS — R07.89: ICD-10-CM

## 2020-05-06 DIAGNOSIS — M79.10: ICD-10-CM

## 2020-05-06 DIAGNOSIS — R09.89: ICD-10-CM

## 2020-05-06 DIAGNOSIS — I10: ICD-10-CM

## 2020-05-06 DIAGNOSIS — J02.9: ICD-10-CM

## 2020-05-06 LAB
A TYPE INFLUENZA AG: NEGATIVE
ABSOLUTE LYMPHOCYTES# (MANUAL): 1.2 10^3/UL (ref 0.5–4.7)
ABSOLUTE MONOCYTES # (MANUAL): 0.3 10^3/UL (ref 0.1–1.4)
ADD MANUAL DIFF: YES
ANION GAP SERPL CALC-SCNC: 7 MMOL/L (ref 5–19)
ANISOCYTOSIS BLD QL SMEAR: (no result)
B INFLUENZA AG: NEGATIVE
BASOPHILS NFR BLD MANUAL: 0 % (ref 0–2)
BUN SERPL-MCNC: 19 MG/DL (ref 7–20)
BURR CELLS BLD QL SMEAR: SLIGHT
CALCIUM: 9.4 MG/DL (ref 8.4–10.2)
CHLORIDE SERPL-SCNC: 109 MMOL/L (ref 98–107)
CO2 SERPL-SCNC: 24 MMOL/L (ref 22–30)
DACRYOCYTES BLD QL SMEAR: SLIGHT
EOSINOPHIL NFR BLD MANUAL: 0 % (ref 0–6)
ERYTHROCYTE [DISTWIDTH] IN BLOOD BY AUTOMATED COUNT: 16 % (ref 11.5–14)
GLUCOSE SERPL-MCNC: 96 MG/DL (ref 75–110)
HCT VFR BLD CALC: 34.7 % (ref 36–47)
HGB BLD-MCNC: 11.8 G/DL (ref 12–15.5)
MCH RBC QN AUTO: 32.1 PG (ref 27–33.4)
MCHC RBC AUTO-ENTMCNC: 34.1 G/DL (ref 32–36)
MCV RBC AUTO: 94 FL (ref 80–97)
MONOCYTES % (MANUAL): 8 % (ref 3–13)
OVALOCYTES BLD QL SMEAR: SLIGHT
PLATELET # BLD: 170 10^3/UL (ref 150–450)
PLATELET CLUMP BLD QL SMEAR: PRESENT
PLATELET COMMENT: ADEQUATE
POTASSIUM SERPL-SCNC: 4.4 MMOL/L (ref 3.6–5)
RBC # BLD AUTO: 3.68 10^6/UL (ref 3.72–5.28)
SEGMENTED NEUTROPHILS % (MAN): 57 % (ref 42–78)
TOTAL CELLS COUNTED BLD: 100
VARIANT LYMPHS NFR BLD MANUAL: 35 % (ref 13–45)
WBC # BLD AUTO: 3.4 10^3/UL (ref 4–10.5)
WBC TOXIC VACUOLES BLD QL SMEAR: PRESENT

## 2020-05-06 PROCEDURE — 99211 OFF/OP EST MAY X REQ PHY/QHP: CPT

## 2020-05-06 PROCEDURE — 87635 SARS-COV-2 COVID-19 AMP PRB: CPT

## 2020-05-06 PROCEDURE — 87804 INFLUENZA ASSAY W/OPTIC: CPT

## 2020-05-06 PROCEDURE — 80048 BASIC METABOLIC PNL TOTAL CA: CPT

## 2020-05-06 PROCEDURE — 71046 X-RAY EXAM CHEST 2 VIEWS: CPT

## 2020-05-06 PROCEDURE — 87070 CULTURE OTHR SPECIMN AEROBIC: CPT

## 2020-05-06 PROCEDURE — 85025 COMPLETE CBC W/AUTO DIFF WBC: CPT

## 2020-05-06 PROCEDURE — 36415 COLL VENOUS BLD VENIPUNCTURE: CPT

## 2020-05-06 PROCEDURE — 87880 STREP A ASSAY W/OPTIC: CPT

## 2020-05-06 NOTE — ER RDC ASSESSMENT REPORT
Intake





- In the Last 14 days


Have you traveled outside [place]?: No


Have you been in close contact with someone CONFIRMED: No


Worked in Healthcare?: No





- Symptoms


Subjective Fever(Felt feverish): Yes


Chills: Yes


Muscule Aches: Yes


Runny Nose: Yes


Sore Throat: Yes


Cough (New or worsening chronic cough): Yes


Shortness of breath: Yes


Nausea or Vomiting: Yes


Headache: Yes


Abdominal Pain: No


Diarrhea(3 or more loose stools in last 24 hours): No





- Do you have any of the following


Chronic lung disease: Asthma or emphysema or COPD: Yes


Chronic Lung Disease Comment: 





History of COPD


Cystic Fibrosis: No


Diabetes: No


High Blood Pressure: Yes


Cardiovascular Disease: Yes


Chronic Kidney Disease: No


Chronic Liver Disease: No


Chronic blood disorder like Sickle Cell Disease: No


Weak immune system due to disease or medication: No


Neurologic condition that limits movement: No


Developmental delay - Moderate to Severe: No


Recent (within past 2 weeks) or current Pregnancy: No


Morbid Obesity (>100 pounds over ideal weight): No


Obesity Comment: 





Height 5 feet 0 inches weight 140 pounds





- Objective


Temperature: 97.8 F


Pulse Rate: 80


Respiratory Rate: 20


Blood Pressure: 138/65


O2 Sat by Pulse Oximetry: 96


Objective: 


Given above, testing performed: 
































If Testing Performed:


Test Specimen Type Sent to











General





- General


Information source: Patient


Notes: 





Patient at [facility] for Covid testing today. reports has been feeing sick with cough 

SOB body aches for several days. Patient reports buring in upper chest worse 

today.  Known patient of Dr. KEARA Morales reports spoke with him yesterday and had 

picked up a prescription.  





- Related Data


Allergies/Adverse Reactions: 


                                        





amoxicillin Allergy (Verified 10/27/19 11:32)


   


Penicillins Allergy (Verified 10/27/19 11:32)


   











Past Medical History





- Social History


Smoking Status: Never Smoker


Family History: Reviewed & Not Pertinent, Hypertension





- Past Medical History


Cardiac Medical History: Reports: Hx Congestive Heart Failure, Hx Coronary 

Artery Disease, Hx Heart Attack - , Hx Hypercholesterolemia, Hx Hypertension

- ON MEDS


   Denies: Hx Atrial Fibrillation, Hx Peripheral Vascular Disease, Hx Pulmonary 

Embolism, Hx Heart Murmur


Pulmonary Medical History: Reports: Hx Bronchitis, Hx COPD, Hx Pneumonia - YRS 

AGO


   Denies: Hx Asthma, Hx Respiratory Failure, Hx Sleep Apnea, Hx Tuberculosis


Neurological Medical History: Denies: Hx Cerebrovascular Accident, Hx Seizures


Endocrine Medical History: Denies: Hx Diabetes Mellitus Type 1, Hx Diabetes 

Mellitus Type 2


Renal/ Medical History: Reports: Hx Renal Insufficiency.  Denies: Hx End Stage

Renal Disease, Hx Kidney Stones, Hx Peritoneal Dialysis


Malignancy Medical History: Denies: Hx Leukemia, Hx Lung Cancer


GI Medical History: Reports: Hx Crohn's Disease - Patient has either Crohn's 

disease or ulcerative colitis, Hx Diverticulitis, Hx Gastroesophageal Reflux 

Disease.  Denies: Hx Hepatitis, Hx Hiatal Hernia, Hx Irritable Bowel, Hx Liver 

Failure, Hx Pancreatitis, Hx Ulcer


Musculoskeletal Medical History: Reports Hx Arthritis, Denies Hx Fibromyalgia, 

Denies Hx Muscular Dystrophy


Psychiatric Medical History: Reports: Hx Depression


   Denies: Hx Bipolar Disorder, Hx Post Traumatic Stress Disorder, Hx 

Schizophrenia


Traumatic Medical History: Reports: Hx Fractures - right wrist, left foot


Infectious Medical History: Denies: Hx Hepatitis, Hx HIV


Past Surgical History: Reports: Hx Appendectomy, Hx Cardiac Catheterization, Hx 

Cardiac Surgery - AICD for ventricular fibrillation, Hx  Section - x4, 

Hx Coronary Stent, Hx Hysterectomy, Hx Orthopedic Surgery - bilateral knee 

surgery, Hx Pacemaker, Hx Tonsillectomy.  Denies: Hx Bowel Surgery, Hx 

Cholecystectomy, Hx Colostomy, Hx Coronary Artery Bypass Graft, Hx Gastric 

Bypass Surgery, Hx Herniorrhaphy, Hx Mastectomy, Hx Open Heart Surgery, Hx Tubal

Ligation





Physical Exam





- General


General appearance: Appears well, Alert


In distress: None


Notes: 





PHYSICAL EXAMINATION: 


GENERAL: Well-appearing and in no acute distress. 


HEAD: Atraumatic, normocephalic. 


EYES: sclera anicteric, conjunctiva are normal. 


ENT: nares patent. Moist mucous membranes. 


NECK: Normal range of motion, supple without lymphadenopathy 


LUNGS: CTAB and equal. No wheezes rales or rhonchi. Resp even and unlabored. 


HEART: Regular rate and rhythm without murmurs 


ABDOMEN: Soft, nontender, normal bowel sounds, no guarding. 


EXTREMITIES: . No cyanosis. 


NEUROLOGICAL: Normal speech. 


PSYCH: Normal mood, normal affect. 


SKIN: Warm, Dry, normal turgor, 








- Respiratory


Breath sounds: Decreased air movement - notable to left posterior., 

Nonproductive cough - Dry cough occasioanlly noted





Diagnostic Results


Laboratory Results: 


Patient informed of negative rapid strep and negative rapid flu results.  

Pending strep culture; pending COVID testing results.  Patient provided 

instructions regarding COVID to include: As a person under investigation for 

Covid 19, the [place] department of Health and Human Services, division 

of public health advises you to adhere to the following guidance until your test

results are reported to you.  If your test result is positive, you will receive 

additional information from your provider and your local health department at 

that time.


 


Remain at home until you are cleared by the health provider or public health 

authorities.


 


Keep a log of visitors to your home, notify any visitors to your home of your 

isolation status.


 


If you plan to move to a new address or leave the county, notify the local 

health department in your County.


 


Call your doctor or seek care if you have an urgent medical need.  Before 

seeking medical care, call ahead to get instructions from the provider before 

arriving at the medical office clinic or hospital.  Notify them that you are 

being tested for the virus that causes Covid 19 so that arrangements can be 

made, as necessary, to prevent transmission to others in the healthcare setting.

 Next, notify the local health department in your county.


 


If a medical emergency arises and you need to call 911, inform the first 

responders that you are being tested for the virus that causes Covid 19.  Next, 

notify the local health department in your county.





Patient Education/Counseling


Counseling/Education: 





Patient presents with upper respiratory symptoms worrisome for possible Covid 

19.  Patient does not have emergency worring symptoms such as difficulty 

breathing, shortness of breath, chest pain, pressure, confusion or cyanosis.  

Patient appears suitable for discharge. Spoke with Dr. Morales via phone with 

patient complaints/status. Instructed to go to [facility] for additional

testing.  Patient to call Dr. Morales later today.  To ED for persistent or 

worsening symptoms.   Patient's vital signs are stable and patient is nontoxic 

in appearance.  Good return precautions have been discussed with patient, 

patient verbalized understanding and is agreeable with discharge plan of care at

this time.





C Discharge





- Discharge


Clinical Impression: 


 COVID - 19 SCREENING





Condition: Stable


Disposition: Home; Selfcare

## 2020-05-06 NOTE — RADIOLOGY REPORT (SQ)
EXAM DESCRIPTION:  CHEST PA/LATERAL



IMAGES COMPLETED DATE/TIME:  5/6/2020 1:12 pm



REASON FOR STUDY:  COUGH



COMPARISON:  3/27/2020



EXAM PARAMETERS:  NUMBER OF VIEWS: two views

TECHNIQUE: Digital Frontal and Lateral radiographic views of the chest acquired.

RADIATION DOSE: NA

LIMITATIONS: none



FINDINGS:  LUNGS AND PLEURA: Left perihilar opacities largely obscured by the cardiac pacer.  Fiducia
l markers in place.  No definite superimposed airspace disease.  No pleural effusion or pneumothorax.


MEDIASTINUM AND HILAR STRUCTURES: Stable.

HEART AND VASCULAR STRUCTURES: Enlarged, stable.  Vascular calcifications.

BONES: No acute findings.

HARDWARE: Left subclavian base cardiac pacer/defibrillator with leads overlying right ventricle.  Unc
hanged fracture of 1 of the defibrillator leads.

OTHER: No other significant finding.



IMPRESSION:  Stable large cardiac silhouette without other evidence of acute intrathoracic process.

Left-sided cardiac pacer/ defibrillator with unchanged fracture of one of the defibrillator leads.



TECHNICAL DOCUMENTATION:  JOB ID:  [id number]

 2011 [employer]- All Rights Reserved



Reading location - IP/workstation name: [email or web address]

## 2020-07-21 ENCOUNTER — HOSPITAL ENCOUNTER (OUTPATIENT)
Dept: HOSPITAL 62 - SP | Age: 75
End: 2020-07-21
Attending: FAMILY MEDICINE
Payer: MEDICARE

## 2020-07-21 DIAGNOSIS — I73.9: Primary | ICD-10-CM

## 2020-07-21 DIAGNOSIS — R42: ICD-10-CM

## 2020-07-21 PROCEDURE — 93925 LOWER EXTREMITY STUDY: CPT

## 2020-07-21 PROCEDURE — 93880 EXTRACRANIAL BILAT STUDY: CPT

## 2020-07-21 NOTE — RADIOLOGY REPORT (SQ)
EXAM DESCRIPTION:  ARTERIAL LOWER EXTREM BILAT



IMAGES COMPLETED DATE/TIME:  7/21/2020 11:03 am



REASON FOR STUDY:  PVD I73.9  PERIPHERAL VASCULAR DISEASE, UNSPECIFIED R42  DIZZINESS AND GIDDINESS



COMPARISON:  None.



TECHNIQUE:  Dynamic and static gray scale and color images acquired of the lower extremity arteries. 
 Additional selected spectral images recorded.



LIMITATIONS:  None.



FINDINGS:  RIGHT LEG:

INFLOW ARTERIES: Normal, no obstruction evident.

FEMORAL ARTERIES:Atherosclerosis.. Focal areas of stenosis in the proximal and mid superficial femora
l artery with biphasic to monophasic waveform.

POPLITEAL ARTERY:Biphasic waveforms. Normal, no velocity elevation to suggest focal stenosis. Normal 
color Doppler evaluation.  No aneurysm.

PATENT TIBIOPERONEAL TRUNK AND 3 VESSEL RUNOFF: Dampened flow with monophasic waveforms.  No flow in 
the mid and distal anterior tibial artery.  Retrograde flow in the dorsalis pedis artery.

OTHER: No other significant finding.

LEFT LEG:

INFLOW ARTERIES: Normal, no obstruction evident.

FEMORAL ARTERIES:Atherosclerosis.. Focal areas of stenosis in the proximal profunda femoral artery an
d proximal superficial femoral artery.  Biphasic waveforms.

POPLITEAL ARTERY:Biphasic waveforms. Normal, no velocity elevation to suggest focal stenosis. Normal 
color Doppler evaluation.  No aneurysm.

PATENT TIBIOPERONEAL TRUNK AND 3 VESSEL RUNOFF: Dampened flow with monophasic waveforms.  No flow in 
the mid and distal anterior tibial artery.

OTHER: No other significant finding.



IMPRESSION:  ATHEROSCLEROSIS WITH FOCAL AREAS OF STENOSIS IN THE RIGHT SUPERFICIAL FEMORAL ARTERY AND
 IN THE LEFT PROFUNDA FEMORAL AND SUPERFICIAL FEMORAL ARTERIES.  DISTAL SMALL VESSEL DISEASE.



TECHNICAL DOCUMENTATION:  JOB ID:  [id number]

 2011 [employer]- All Rights Reserved



Reading location - IP/workstation name: [email or web address]

## 2020-07-21 NOTE — RADIOLOGY REPORT (SQ)
EXAM DESCRIPTION:  CAROTID DOPPLER



IMAGES COMPLETED DATE/TIME:  7/21/2020 11:03 am



REASON FOR STUDY:  PVD I73.9  PERIPHERAL VASCULAR DISEASE, UNSPECIFIED R42  DIZZINESS AND GIDDINESS



COMPARISON:  8/3/2016.



TECHNIQUE:  Grayscale ultrasound, Doppler velocity and spectra, and color Doppler images acquired of 
the extra-cranial carotid and vertebral arteries. Images stored on PACS.



LIMITATIONS:  None.



FINDINGS:  RIGHT CAROTID

CCA Velocities: Within normal limits.

ICA Velocities

 Peak systolic 0.93 m/s.

 End diastolic 0.34 m/s.

Proximal ICA/CCA peak systolic ratio 1.95.

Scattered plaque.

LEFT CAROTID

CCA Velocities: Within normal limits.

ICA Velocities

 Peak systolic 0.78 m/s.

 End diastolic 0.31 m/s.

Proximal ICA/CCA peak systolic ratio 1.06.

Scattered plaque.

VERTEBRAL ARTERIES: Antegrade flow.  Normal waveforms.

SUBCLAVIAN ARTERIES: No finding.

OTHER: No other significant finding.



IMPRESSION:  NO HEMODYNAMICALLY SIGNIFICANT STENOSIS.



COMMENT:  Quality ID #[id number]:  Velocity criteria are extrapolated from the diameter data as defined by t
he Society of Radiologists in Ultrasound Consensus Conference. Radiology 2003: 229; 340-346.



TECHNICAL DOCUMENTATION:  JOB ID:  [id number]

 2011 [employer]- All Rights Reserved



Reading location - IP/workstation name: [email or web address]

## 2020-07-24 ENCOUNTER — HOSPITAL ENCOUNTER (OUTPATIENT)
Dept: HOSPITAL 62 - RAD | Age: 75
End: 2020-07-24
Attending: PHYSICIAN ASSISTANT
Payer: MEDICARE

## 2020-07-24 DIAGNOSIS — C34.12: Primary | ICD-10-CM

## 2020-07-24 PROCEDURE — 71250 CT THORAX DX C-: CPT

## 2020-07-24 NOTE — RADIOLOGY REPORT (SQ)
EXAM DESCRIPTION:  CT CHEST WITHOUT



IMAGES COMPLETED DATE/TIME:  7/24/2020 10:09 am



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
EOPLASM OF UPPER LOBE, LEFT BRONCHUS OR KARRIE



COMPARISON:  CT 10/27/2019 PET-CT 4/7/2020



TECHNIQUE:  CT scan performed of the chest without intravenous contrast.  Images reviewed with lung, 
soft tissue and bone windows.  Reconstructed coronal and sagittal MPR images reviewed.  All images st
ored on PACS.

All CT scanners at this facility use dose modulation, iterative reconstruction, and/or weight based d
osing when appropriate to reduce radiation dose to as low as reasonably achievable (ALARA).

[facility]: Dose Right  [facility]: CareDose    [facility]: Dose Right    [facility]: Teradose 4D    [facility]: Smart Technologies



RADIATION DOSE:  CT Rad equipment meets quality standard of care and radiation dose reduction techniq
ues were employed. CTDIvol: 4.9 mGy. DLP: 189 mGy-cm. mGy.



LIMITATIONS:  No technical limitations.



FINDINGS:  LUNGS AND PLEURA: 15.9 x 10.4 mm scar in the left upper lobe is stable since the PET-CT of
 4/7/2020.  Mild radiation changes.  No new nodules or masses.  No infiltrate or effusion.

HILAR AND MEDIASTINAL STRUCTURES: No identified masses or abnormal nodes.  No obvious aneurysm.

HEART AND VASCULAR STRUCTURES: No aneurysm.  No pericardial effusion.

UPPER ABDOMEN: No significant findings.  Limited exam.

THYROID AND OTHER SOFT TISSUES: No masses.  No adenopathy.

BONES: No significant finding.

HARDWARE: Cardiac pacemaker/ defibrillator.

OTHER: No other significant findings.



IMPRESSION:  Stable findings in the chest with 15.9 x 10.4 mm scar in the left upper lobe.



TECHNICAL DOCUMENTATION:  JOB ID:  [id number]

Quality ID # 436: Final reports with documentation of one or more dose reduction techniques (e.g., Au
tomated exposure control, adjustment of the mA and/or kV according to patient size, use of iterative 
reconstruction technique)

 2011 [employer]- All Rights Reserved



Reading location - IP/workstation name: [email or web address]

## 2020-08-08 ENCOUNTER — HOSPITAL ENCOUNTER (EMERGENCY)
Dept: HOSPITAL 62 - ER | Age: 75
LOS: 1 days | Discharge: HOME | End: 2020-08-09
Payer: MEDICARE

## 2020-08-08 DIAGNOSIS — Z79.52: ICD-10-CM

## 2020-08-08 DIAGNOSIS — I25.2: ICD-10-CM

## 2020-08-08 DIAGNOSIS — J44.9: ICD-10-CM

## 2020-08-08 DIAGNOSIS — I10: ICD-10-CM
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
## 2020-08-08 DIAGNOSIS — S05.11XA: Primary | ICD-10-CM

## 2020-08-08 DIAGNOSIS — Z95.5: ICD-10-CM

## 2020-08-08 DIAGNOSIS — S80.01XA: ICD-10-CM

## 2020-08-08 DIAGNOSIS — I25.10: ICD-10-CM

## 2020-08-08 DIAGNOSIS — Z95.810: ICD-10-CM

## 2020-08-08 DIAGNOSIS — M25.562: ICD-10-CM

## 2020-08-08 LAB
ADD MANUAL DIFF: NO
APTT BLD: 27.2 SEC (ref 23.5–35.8)
BASOPHILS # BLD AUTO: 0 10^3/UL (ref 0–0.2)
BASOPHILS NFR BLD AUTO: 0.3 % (ref 0–2)
EOSINOPHIL # BLD AUTO: 0 10^3/UL (ref 0–0.6)
EOSINOPHIL NFR BLD AUTO: 0.3 % (ref 0–6)
ERYTHROCYTE [DISTWIDTH] IN BLOOD BY AUTOMATED COUNT: 15.7 % (ref 11.5–14)
HCT VFR BLD CALC: 36.5 % (ref 36–47)
HGB BLD-MCNC: 12 G/DL (ref 12–15.5)
INR PPP: 1
LYMPHOCYTES # BLD AUTO: 1 10^3/UL (ref 0.5–4.7)
LYMPHOCYTES NFR BLD AUTO: 29.4 % (ref 13–45)
MCH RBC QN AUTO: 31.5 PG (ref 27–33.4)
MCHC RBC AUTO-ENTMCNC: 32.9 G/DL (ref 32–36)
MCV RBC AUTO: 96 FL (ref 80–97)
MONOCYTES # BLD AUTO: 0.2 10^3/UL (ref 0.1–1.4)
MONOCYTES NFR BLD AUTO: 5.8 % (ref 3–13)
NEUTROPHILS # BLD AUTO: 2.2 10^3/UL (ref 1.7–8.2)
NEUTS SEG NFR BLD AUTO: 64.2 % (ref 42–78)
PLATELET # BLD: 190 10^3/UL (ref 150–450)
PROTHROMBIN TIME: 13.4 SEC (ref 11.4–15.4)
RBC # BLD AUTO: 3.81 10^6/UL (ref 3.72–5.28)
TOTAL CELLS COUNTED % (AUTO): 100 %
WBC # BLD AUTO: 3.4 10^3/UL (ref 4–10.5)

## 2020-08-08 PROCEDURE — 99284 EMERGENCY DEPT VISIT MOD MDM: CPT

## 2020-08-08 PROCEDURE — 85025 COMPLETE CBC W/AUTO DIFF WBC: CPT

## 2020-08-08 PROCEDURE — 71046 X-RAY EXAM CHEST 2 VIEWS: CPT

## 2020-08-08 PROCEDURE — 85610 PROTHROMBIN TIME: CPT

## 2020-08-08 PROCEDURE — 36415 COLL VENOUS BLD VENIPUNCTURE: CPT

## 2020-08-08 PROCEDURE — 73564 X-RAY EXAM KNEE 4 OR MORE: CPT

## 2020-08-08 PROCEDURE — 85730 THROMBOPLASTIN TIME PARTIAL: CPT

## 2020-08-08 PROCEDURE — 82962 GLUCOSE BLOOD TEST: CPT

## 2020-08-08 PROCEDURE — 93005 ELECTROCARDIOGRAM TRACING: CPT

## 2020-08-08 PROCEDURE — 72125 CT NECK SPINE W/O DYE: CPT

## 2020-08-08 PROCEDURE — 93010 ELECTROCARDIOGRAM REPORT: CPT

## 2020-08-08 PROCEDURE — 70450 CT HEAD/BRAIN W/O DYE: CPT

## 2020-08-08 PROCEDURE — 80053 COMPREHEN METABOLIC PANEL: CPT

## 2020-08-08 NOTE — RADIOLOGY REPORT (SQ)
Left knee radiographs: 8/8/2020 9:59 PM CDT



HISTORY: 75-year-old patient with left knee pain.



TECHNIQUE: AP, oblique, and lateral images of the left knee were

obtained.



COMPARISON: None available



FINDINGS: There are no findings to suggest an acute fracture or

subluxation. No suprapatellar joint effusion is seen. The

visualized soft tissues are grossly unremarkable. No gross

erosions or abnormal soft tissue calcifications are seen.



IMPRESSION: There are no findings to suggest an acute fracture or

subluxation within the left knee.

## 2020-08-08 NOTE — RADIOLOGY REPORT (SQ)
CT of the head: 8/8/2020 10:00 PM CDT



HISTORY: 75-year-old patient with fall, altered mental status.



COMPARISON: 10/7/2019



TECHNIQUE: Multiple axial contiguous images were obtained through

the head without intravenous contrast administered. This exam was

performed according to our departmental dose-optimization

program, which includes automated exposure control, adjustment of

the mA and/or KV according to the patient's size and/or use of

iterative reconstruction technique.



FINDINGS: The ventricles and cerebral sulci demonstrate mild

prominence, consistent with cerebral atrophy. There is a cavum

septum lucidum et vergae noted. There are mild periventricular

hypodensities, suggestive of periventricular white matter

changes. The gray-white matter differentiation is within normal

limits. Both orbits appear unremarkable. The mastoid air cells

appear clear. There is mild mucoperiosteal thickening of the

ethmoid sinuses. The calvarium is intact. No extra-axial fluid

collection is seen. No midline shift or mass effect is apparent.

There are no findings to suggest acute intracranial hemorrhage. 



IMPRESSION: 1. No acute intracranial hemorrhage is seen.

2. Mild cerebral atrophy and periventricular white matter changes

are seen.

## 2020-08-08 NOTE — RADIOLOGY REPORT (SQ)
PA and lateral chest radiograph: 8/8/2020 10:02 PM CDT



History: 75-year old patient with fall.



Comparison: Chest radiograph performed 5/6/2020



Findings: The cardiomediastinal silhouette is at the upper limits

of normal in size. No pneumothorax is seen. No discrete pleural

effusion is apparent. No acute airspace opacities are seen. A

dual-lead left-sided pacemaker/AICD is seen. Atherosclerotic

calcifications are seen at the aortic arch. Coronary artery stent

is seen.



Impression: No acute airspace opacities are seen.

## 2020-08-08 NOTE — RADIOLOGY REPORT (SQ)
CT CERVICAL SPINE: 8/8/2020 9:59 PM CDT



TECHNIQUE: Axial contiguous images were obtained through the

cervical spine without intravenous contrast. Sagittal and coronal

reconstructions were also reviewed. This exam was performed

according to our departmental dose-optimization program, which

includes automated exposure control, adjustment of the mA and/or

KV according to the patient's size and/or use of iterative

reconstruction technique.



COMPARISON: None available



INDICATION: 75-year old patient with neck pain, fall.



FINDINGS: Mild facet hypertrophy is noted. A few anterior

degenerative osteophytes are also apparent. The vertebral bodies

appear well aligned.  The vertebral body heights appear well

maintained. No significant pre-vertebral soft tissue swelling is

noted. No definite fracture or subluxation is noted. Mild to

moderate multilevel intervertebral disc space narrowing is seen

associated with disc osteophyte. There is protuberant

calcification seen posterior to the dens. The visualized brain

parenchyma appears unremarkable. The craniocervical junction is

unremarkable. There is moderate atherosclerotic calcification

seen at the carotid bulbs. Pacemaker leads are seen.



IMPRESSION: There are no findings to suggest an acute fracture or

subluxation within the cervical spine.

## 2020-08-08 NOTE — ER DOCUMENT REPORT
ED General





- General


Chief Complaint: Fall Injury


Stated Complaint: FALL/HEAD INJURY/PAIN


Time Seen by Provider: 20 21:59


Primary Care Provider: 


BISMARK CABALLERO MD [Primary Care Provider] - Follow up as needed


Mode of Arrival: Ambulatory


Information source: Patient


Notes: 


ED Medical Screen (RME)





- General


Chief Complaint: Fall Injury


Stated Complaint: FALL/HEAD INJURY/PAIN


Time Seen by Provider: 20 21:59


Primary Care Provider: 


BISMARK CABALLERO MD [Primary Care Provider] - Follow up as needed


Notes: 





Patient is a 75-year-old female who presents to the emergency department after a

fall.  Patient was in the grocery store and she turned and fell back and hit her

head and hurt her left knee.  Patient does not remember anything after falling. 

She does not know how long she lost consciousness.  Patient is currently on 

aspirin.





Exam: Delayed response to questions.  Tenderness noted to left knee.








MY NOTES





70-year-old female arrives with chief complaint of injury to head and left knee 

injury after falling while in gas station; patient reports she turned around and

struck her head on some metallic object knocking her to the floor injuring her 

left patella knee.  She denies any pain above the kneecap or below the kneecap 

but very tender on palpation.  She also struck her right lateral orbit pointing 

to this area.  She denies any chest pain back pain neck pain abdominal injury.  

She denies any blurry vision or fever chills cough or cold.  She reports she has

fallen before but never injured her face or her left knee.  I was in room with 

nursing staff while pants were removed for knee exam.  I then exited the room so

EKG may be done.  The fall occurred tonight around 9 PM.  Her  brought 

her by private vehicle directly from the gas station where the injury occurred. 

A woman helped her up and escorted her to her car.  She denies any LOC.


TRAVEL OUTSIDE OF THE [place] IN LAST 30 DAYS: No





- HPI


Onset: Just prior to arrival


Onset/Duration: Sudden


Quality of pain: Achy


Severity: Mild


Pain Level: 1


Associated symptoms: None


Exacerbated by: Movement


Relieved by: Remaining still


Similar symptoms previously: No


Recently seen / treated by doctor: No





- Related Data


Allergies/Adverse Reactions: 


                                        





amoxicillin Allergy (Verified 10/27/19 11:32)


   


Penicillins Allergy (Verified 10/27/19 11:32)


   











Past Medical History





- General


Information source: Patient





- Social History


Smoking Status: Never Smoker


Cigarette use (# per day): No


Chew tobacco use (# tins/day): No


Smoking Education Provided: No


Frequency of alcohol use: None


Drug Abuse: None


Lives with: Family


Family History: Reviewed & Not Pertinent, Hypertension


Patient has suicidal ideation: No


Patient has homicidal ideation: No





- Past Medical History


Cardiac Medical History: Reports: Hx Congestive Heart Failure, Hx Coronary 

Artery Disease, Hx Heart Attack - , Hx Hypercholesterolemia, Hx Hypertension

- ON MEDS


   Denies: Hx Atrial Fibrillation, Hx Peripheral Vascular Disease, Hx Pulmonary 

Embolism, Hx Heart Murmur


Pulmonary Medical History: Reports: Hx Bronchitis, Hx COPD, Hx Pneumonia - YRS 

AGO


   Denies: Hx Asthma, Hx Respiratory Failure, Hx Sleep Apnea, Hx Tuberculosis


Neurological Medical History: Denies: Hx Cerebrovascular Accident, Hx Seizures


Endocrine Medical History: Denies: Hx Diabetes Mellitus Type 1, Hx Diabetes 

Mellitus Type 2


Renal/ Medical History: Reports: Hx Renal Insufficiency.  Denies: Hx End Stage

Renal Disease, Hx Kidney Stones, Hx Peritoneal Dialysis


Malignancy Medical History: Denies: Hx Leukemia, Hx Lung Cancer


GI Medical History: Reports: Hx Crohn's Disease - Patient has either Crohn's 

disease or ulcerative colitis, Hx Diverticulitis, Hx Gastroesophageal Reflux 

Disease.  Denies: Hx Hepatitis, Hx Hiatal Hernia, Hx Irritable Bowel, Hx Liver 

Failure, Hx Pancreatitis, Hx Ulcer


Musculoskeletal Medical History: Reports Hx Arthritis, Denies Hx Fibromyalgia, 

Denies Hx Muscular Dystrophy


Psychiatric Medical History: Reports: Hx Depression


   Denies: Hx Bipolar Disorder, Hx Post Traumatic Stress Disorder, Hx 

Schizophrenia


Traumatic Medical History: Reports: Hx Fractures - right wrist, left foot


Infectious Medical History: Denies: Hx Hepatitis, Hx HIV


Past Surgical History: Reports: Hx Appendectomy, Hx Cardiac Catheterization, Hx 

Cardiac Surgery - AICD for ventricular fibrillation, Hx  Section - x4, 

Hx Coronary Stent, Hx Hysterectomy, Hx Orthopedic Surgery - bilateral knee 

surgery, Hx Pacemaker, Hx Tonsillectomy.  Denies: Hx Bowel Surgery, Hx 

Cholecystectomy, Hx Colostomy, Hx Coronary Artery Bypass Graft, Hx Gastric 

Bypass Surgery, Hx Herniorrhaphy, Hx Mastectomy, Hx Open Heart Surgery, Hx Tubal

Ligation





- Immunizations


Immunizations up to date: Yes


Hx Diphtheria, Pertussis, Tetanus Vaccination: Yes


Hx Pneumococcal Vaccination: 18





Review of Systems





- Review of Systems


Constitutional: No symptoms reported


EENT: No symptoms reported, See HPI, Eye pain - on right orbit


Cardiovascular: No symptoms reported


Respiratory: No symptoms reported


Gastrointestinal: No symptoms reported


Genitourinary: No symptoms reported


Female Genitourinary: No symptoms reported


Musculoskeletal: No symptoms reported, See HPI, Joint pain


Skin: No symptoms reported


Hematologic/Lymphatic: No symptoms reported


Neurological/Psychological: No symptoms reported





Physical Exam





- Vital signs


Vitals: 


                                        











Pulse Ox


 


 94 


 


 20 22:44











Interpretation: Normal





- General


General appearance: Alert





- HEENT


Head: Normocephalic, Atraumatic, Other - Superior and lateral right orbital pain

and swelling on palpation.  Patient has full range of motion of extraocular eye 

muscles and able to move eyebrows without difficulty.  There is no obvious 

lacerations or hematomas noted or ecchymosis noted.  Patient is wearing a wig


Eyes: Normal


Pupils: PERRL


Visual acuity- Right eye: 20/20


Visual acuity- Left eye: 20/20 both


Visual acuity- Both eyes: By Snellen


Sinus: Normal


Nasal: Normal


Mouth/Lips: Normal


Mucous membranes: Normal


Pharynx: Normal


Neck: Normal





- Respiratory


Respiratory status: No respiratory distress


Chest status: Nontender


Breath sounds: Normal


Chest palpation: Normal





- Cardiovascular


Rhythm: Regular


Heart sounds: Normal auscultation


Murmur: No





- Abdominal


Inspection: Normal


Distension: No distension


Bowel sounds: Normal


Tenderness: Nontender


Organomegaly: No organomegaly





- Rectal


Hemorrhoids: Other - deferred





- Genitourinary


Bimanuel exam: Other - deferred





- Back


Back: Normal





- Extremities


General upper extremity: Normal inspection


General lower extremity: Other - left patellar pain on p/p rom





- Neurological


Neuro grossly intact: Yes


Cognition: Normal


Orientation: AAOx4


Olvin Coma Scale Eye Opening: Spontaneous


Olvin Coma Scale Verbal: Oriented


Descanso Coma Scale Motor: Obeys Commands


Descanso Coma Scale Total: 15


Speech: Normal


Motor strength normal: LUE, RUE, LLE, RLE


Sensory: Normal





- Psychological


Associated symptoms: Normal affect





- Skin


Skin Temperature: Warm


Skin Moisture: Dry





Course





- Vital Signs


Vital signs: 


                                        











Temp Pulse Resp BP Pulse Ox


 


       22 H  198/80 H  99 


 


       20 23:00  20 22:49  20 23:12














- Diagnostic Test


Radiology reviewed: Reports reviewed


Radiology results interpreted by me: 





20 23:40


X-rays and CTs were all negative.





Discharge





- Discharge


Clinical Impression: 


 Contusion of eyeball and orbital tissues, right eye, initial encounter





Fall


Qualifiers:


 Encounter type: initial encounter Qualified Code(s): W19.XXXA - Unspecified 

fall, initial encounter





Contusion of knee, right


Qualifiers:


 Encounter type: initial encounter Qualified Code(s): S80.01XA - Contusion of 

right knee, initial encounter





Condition: Good


Disposition: HOME, SELF-CARE


Additional Instructions: 


Apply cool compresses to right lateral eye and to left knee as needed for pain 

and inflammation.  Take pain medicine for right lateral orbit and left knee pain

as needed.


Prescriptions: 


Naproxen [Naprosyn 250 mg Tablet] 250 mg PO BID #10 tablet


Referrals: 


BISMARK CABALLERO MD [Primary Care Provider] - Follow up as needed

## 2020-08-08 NOTE — ER DOCUMENT REPORT
ED Medical Screen (RME)





- General


Chief Complaint: Fall Injury


Stated Complaint: FALL/HEAD INJURY/PAIN


Time Seen by Provider: 20 21:59


Primary Care Provider: 


BISMARK CABALLERO MD [Primary Care Provider] - Follow up as needed


Notes: 





Patient is a 75-year-old female who presents to the emergency department after a

fall.  Patient was in the grocery store and she turned and fell back and hit her

head and hurt her left knee.  Patient does not remember anything after falling. 

She does not know how long she lost consciousness.  Patient is currently on 

aspirin.





Exam: Delayed response to questions.  Tenderness noted to left knee.





I have greeted and performed a rapid initial assessment of this patient.  A 

comprehensive ED assessment and evaluation of the patient, analysis of test 

results and completion of medical decision making process will be conducted by 

an additional ED providers.


TRAVEL OUTSIDE OF THE [place] IN LAST 30 DAYS: No





- Related Data


Allergies/Adverse Reactions: 


                                        





amoxicillin Allergy (Verified 10/27/19 11:32)


   


Penicillins Allergy (Verified 10/27/19 11:32)


   











Past Medical History





- Past Medical History


Cardiac Medical History: Reports: Hx Congestive Heart Failure, Hx Coronary 

Artery Disease, Hx Heart Attack - , Hx Hypercholesterolemia, Hx Hypertension

- ON MEDS


   Denies: Hx Atrial Fibrillation, Hx Peripheral Vascular Disease, Hx Pulmonary 

Embolism, Hx Heart Murmur


Pulmonary Medical History: Reports: Hx Bronchitis, Hx COPD, Hx Pneumonia - YRS 

AGO


   Denies: Hx Asthma, Hx Respiratory Failure, Hx Sleep Apnea, Hx Tuberculosis


Neurological Medical History: Denies: Hx Cerebrovascular Accident, Hx Seizures


Endocrine Medical History: Denies: Hx Diabetes Mellitus Type 1, Hx Diabetes 

Mellitus Type 2


Renal/ Medical History: Reports: Hx Renal Insufficiency.  Denies: Hx End Stage

Renal Disease, Hx Kidney Stones, Hx Peritoneal Dialysis


Malignancy Medical History: Denies: Hx Leukemia, Hx Lung Cancer


GI Medical History: Reports: Hx Crohn's Disease - Patient has either Crohn's 

disease or ulcerative colitis, Hx Diverticulitis, Hx Gastroesophageal Reflux 

Disease.  Denies: Hx Hepatitis, Hx Hiatal Hernia, Hx Irritable Bowel, Hx Liver 

Failure, Hx Pancreatitis, Hx Ulcer


Musculoskeltal Medical History: Reports Hx Arthritis, Denies Hx Fibromyalgia, 

Denies Hx Muscular Dystrophy


Psychiatric Medical History: Reports: Hx Depression


   Denies: Hx Bipolar Disorder, Hx Post Traumatic Stress Disorder, Hx 

Schizophrenia


Traumatic Medical History: Reports: Hx Fractures - right wrist, left foot


Infectious Medical History: Denies: Hx Hepatitis, Hx HIV


Past Surgical History: Reports: Hx Appendectomy, Hx Cardiac Catheterization, Hx 

Cardiac Surgery - AICD for ventricular fibrillation, Hx  Section - x4, 

Hx Coronary Stent, Hx Hysterectomy, Hx Orthopedic Surgery - bilateral knee 

surgery, Hx Pacemaker, Hx Tonsillectomy.  Denies: Hx Bowel Surgery, Hx 

Cholecystectomy, Hx Colostomy, Hx Coronary Artery Bypass Graft, Hx Gastric 

Bypass Surgery, Hx Herniorrhaphy, Hx Mastectomy, Hx Open Heart Surgery, Hx Tubal

Ligation





- Immunizations


Immunizations up to date: Yes


Hx Diphtheria, Pertussis, Tetanus Vaccination: Yes





Doctor's Discharge





- Discharge


Referrals: 


BISMARK CABALLERO MD [Primary Care Provider] - Follow up as needed

## 2020-08-09 VITALS — DIASTOLIC BLOOD PRESSURE: 71 MMHG | SYSTOLIC BLOOD PRESSURE: 165 MMHG

## 2020-08-09 LAB
ALBUMIN SERPL-MCNC: 4.3 G/DL (ref 3.5–5)
ALP SERPL-CCNC: 94 U/L (ref 38–126)
ANION GAP SERPL CALC-SCNC: 8 MMOL/L (ref 5–19)
AST SERPL-CCNC: 28 U/L (ref 14–36)
BILIRUB DIRECT SERPL-MCNC: 0 MG/DL (ref 0–0.4)
BILIRUB SERPL-MCNC: 0.7 MG/DL (ref 0.2–1.3)
BUN SERPL-MCNC: 22 MG/DL (ref 7–20)
CALCIUM: 9.6 MG/DL (ref 8.4–10.2)
CHLORIDE SERPL-SCNC: 104 MMOL/L (ref 98–107)
CO2 SERPL-SCNC: 30 MMOL/L (ref 22–30)
GLUCOSE SERPL-MCNC: 103 MG/DL (ref 75–110)
POTASSIUM SERPL-SCNC: 3.6 MMOL/L (ref 3.6–5)
PROT SERPL-MCNC: 7.6 G/DL (ref 6.3–8.2)

## 2020-08-09 NOTE — EKG REPORT
SEVERITY:- ABNORMAL ECG -

SINUS RHYTHM

ATRIAL PREMATURE COMPLEX

REPOL ABNRM SUGGESTS ISCHEMIA, ANT-LAT LEADS

:

Confirmed by: Salbador Benjamin MD 09-Aug-2020 08:22:06

## 2020-09-12 ENCOUNTER — HOSPITAL ENCOUNTER (OUTPATIENT)
Dept: HOSPITAL 62 - RAD | Age: 75
End: 2020-09-12
Attending: OPHTHALMOLOGY
Payer: MEDICARE

## 2020-09-12 DIAGNOSIS — S00.11XA: ICD-10-CM

## 2020-09-12 DIAGNOSIS — H57.11: Primary | ICD-10-CM
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
## 2020-09-12 DIAGNOSIS — G44.311: ICD-10-CM
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
## 2020-09-12 PROCEDURE — 70450 CT HEAD/BRAIN W/O DYE: CPT

## 2020-09-12 PROCEDURE — 70480 CT ORBIT/EAR/FOSSA W/O DYE: CPT

## 2020-09-12 NOTE — RADIOLOGY REPORT (SQ)
EXAM DESCRIPTION:  CT HEAD WITHOUT



IMAGES COMPLETED DATE/TIME:  9/12/2020 10:17 am



REASON FOR STUDY:  Post Fall/Headache/Plavix H57.11  OCULAR PAIN, RIGHT EYE S00.11XA  CONTUSION OF RI
GHT EYELID AND PERIOCULAR AREA, INIT G44.311  ACUTE POST-TRAUMATIC HEADACHE, INTRACTABLE



COMPARISON:  None.



TECHNIQUE:  Axial images acquired through the brain without intravenous contrast.  Images reviewed wi
th bone, brain and subdural windows.  Additional sagittal and coronal reconstructions were generated.
 Images stored on PACS.

All CT scanners at this facility use dose modulation, iterative reconstruction, and/or weight based d
osing when appropriate to reduce radiation dose to as low as reasonably achievable (ALARA).

[facility]: Dose Right  [facility]: CareDose    [facility]: Dose Right    [facility]: Teradose 4D    [facility]: Smart Technologies



RADIATION DOSE:  CT Rad equipment meets quality standard of care and radiation dose reduction techniq
ues were employed. CTDIvol: 48.5 mGy. DLP: 855 mGy-cm. mGy.



LIMITATIONS:  None.



FINDINGS:  VENTRICLES: Prominent.

CEREBRUM: No masses.  No hemorrhage.  No midline shift.  Areas of low density in the white matter mos
t likely due to chronic micro-vascular ischemic change.  No evidence for acute infarction.

CEREBELLUM: No masses.  No hemorrhage.  No alteration of density.  No evidence for acute infarction.

EXTRAAXIAL SPACES: Mild age-related involutional change.  No fluid collections.  No masses.

ORBITS AND GLOBE: No intra- or extraconal masses.  Normal contour of globe without masses.

CALVARIUM: No fracture.

PARANASAL SINUSES: No fluid or mucosal thickening.

SOFT TISSUES: No mass or hematoma.

OTHER: No other significant finding.



IMPRESSION:  MILD CHRONIC CHANGES OF ATROPHY AND MICROVASCULAR ISCHEMIA.  NO ACUTE PROCESS.

EVIDENCE OF ACUTE STROKE: NO.



TECHNICAL DOCUMENTATION:  JOB ID:  [id number]

Quality ID # 436: Final reports with documentation of one or more dose reduction techniques (e.g., Au
tomated exposure control, adjustment of the mA and/or kV according to patient size, use of iterative 
reconstruction technique)

 2011 [email or web address]- All Rights Reserved



Reading location - IP/workstation name: [email or web address]

## 2020-09-12 NOTE — RADIOLOGY REPORT (SQ)
EXAM DESCRIPTION:  CT ORBIT/SELLA WITHOUT



IMAGES COMPLETED DATE/TIME:  9/12/2020 10:17 am



REASON FOR STUDY:  Post Fall/Ocular Pain and Contusion RT Eye H57.11  OCULAR PAIN, RIGHT EYE S00.11XA
  CONTUSION OF RIGHT EYELID AND PERIOCULAR AREA, INIT G44.311  ACUTE POST-TRAUMATIC HEADACHE, INTRACT
ABLE



COMPARISON:  None.



TECHNIQUE:  Noncontrasted images through the orbits windowed for bone and soft tissue.  Additional co
azucena and sagittal reconstructed images reviewed.  All images stored on PACS.

All CT scanners at this facility use dose modulation, iterative reconstruction, and/or weight based d
osing when appropriate to reduce radiation dose to as low as reasonably achievable (ALARA).

[facility]: Dose Right  [facility]: CareDose    [facility]: Dose Right    [facility]: Teradose 4D    [facility]: [employer]



RADIATION DOSE:   mGy.



LIMITATIONS:  None.



FINDINGS:  FACIAL BONES: No fracture or bone lesion.

ORBITS: Intact.  No fracture.  Symmetric intact globes and retroorbital soft tissues.

PARANASAL SINUSES: Clear.

SOFT TISSUES: No mass or edema.

INFERIOR BRAIN: See separate report same date.

OTHER: No other significant finding.



IMPRESSION:  NO ACUTE FINDINGS.



TECHNICAL DOCUMENTATION:  JOB ID:  [id number]

Quality ID # 436: Final reports with documentation of one or more dose reduction techniques (e.g., Au
tomated exposure control, adjustment of the mA and/or kV according to patient size, use of iterative 
reconstruction technique)

 2011 [employer]- All Rights Reserved



Reading location - IP/workstation name: [email or web address]

## 2020-10-05 ENCOUNTER — HOSPITAL ENCOUNTER (OUTPATIENT)
Dept: HOSPITAL 62 - OD | Age: 75
End: 2020-10-05
Attending: PHYSICIAN ASSISTANT
Payer: MEDICARE

## 2020-10-05 DIAGNOSIS — R05: Primary | ICD-10-CM

## 2020-10-05 PROCEDURE — 71046 X-RAY EXAM CHEST 2 VIEWS: CPT

## 2020-10-05 NOTE — RADIOLOGY REPORT (SQ)
EXAM DESCRIPTION:  CHEST PA/LATERAL



IMAGES COMPLETED DATE/TIME:  10/5/2020 3:39 pm



REASON FOR STUDY:  (R05) COUGH



COMPARISON:  8/8/2020



EXAM PARAMETERS:  NUMBER OF VIEWS: two views

TECHNIQUE: Digital Frontal and Lateral radiographic views of the chest acquired.

RADIATION DOSE: NA

LIMITATIONS: none



FINDINGS:  LUNGS AND PLEURA: No opacities, masses or pneumothorax. No pleural effusion.

MEDIASTINUM AND HILAR STRUCTURES: No masses or contour abnormalities.

HEART AND VASCULAR STRUCTURES: Heart normal size.  No evidence for failure.

BONES: No acute findings.

HARDWARE: Pacemaker/defibrillator.

OTHER: No other significant finding.



IMPRESSION:  NO SIGNIFICANT RADIOGRAPHIC FINDING IN THE CHEST.



TECHNICAL DOCUMENTATION:  JOB ID:  [id number]

 2011 [employer]- All Rights Reserved



Reading location - IP/workstation name: [email or web address]

## 2020-11-04 ENCOUNTER — HOSPITAL ENCOUNTER (OUTPATIENT)
Dept: HOSPITAL 62 - ER | Age: 75
Setting detail: OBSERVATION
LOS: 2 days | Discharge: HOME HEALTH | End: 2020-11-06
Attending: FAMILY MEDICINE | Admitting: FAMILY MEDICINE
Payer: MEDICARE

## 2020-11-04 DIAGNOSIS — Z95.810: ICD-10-CM

## 2020-11-04 DIAGNOSIS — I50.9: ICD-10-CM

## 2020-11-04 DIAGNOSIS — Z91.19: ICD-10-CM

## 2020-11-04 DIAGNOSIS — R53.1: ICD-10-CM

## 2020-11-04 DIAGNOSIS — H40.053: ICD-10-CM

## 2020-11-04 DIAGNOSIS — R51.9: ICD-10-CM

## 2020-11-04 DIAGNOSIS — M54.2: ICD-10-CM

## 2020-11-04 DIAGNOSIS — C34.12: ICD-10-CM

## 2020-11-04 DIAGNOSIS — I47.2: ICD-10-CM

## 2020-11-04 DIAGNOSIS — Z63.4: ICD-10-CM

## 2020-11-04 DIAGNOSIS — H53.8: ICD-10-CM

## 2020-11-04 DIAGNOSIS — G89.29: ICD-10-CM

## 2020-11-04 DIAGNOSIS — H57.10: ICD-10-CM

## 2020-11-04 DIAGNOSIS — M25.512: ICD-10-CM

## 2020-11-04 DIAGNOSIS — R26.81: ICD-10-CM

## 2020-11-04 DIAGNOSIS — J44.9: ICD-10-CM

## 2020-11-04 DIAGNOSIS — F32.9: ICD-10-CM

## 2020-11-04 DIAGNOSIS — Z95.5: ICD-10-CM

## 2020-11-04 DIAGNOSIS — Z79.899: ICD-10-CM

## 2020-11-04 DIAGNOSIS — I25.2: ICD-10-CM

## 2020-11-04 DIAGNOSIS — R31.29: ICD-10-CM

## 2020-11-04 DIAGNOSIS — F03.90: ICD-10-CM

## 2020-11-04 DIAGNOSIS — Z79.02: ICD-10-CM

## 2020-11-04 DIAGNOSIS — F17.210: ICD-10-CM

## 2020-11-04 DIAGNOSIS — Z96.1: ICD-10-CM

## 2020-11-04 DIAGNOSIS — I25.10: ICD-10-CM

## 2020-11-04 DIAGNOSIS — I11.0: Primary | ICD-10-CM

## 2020-11-04 DIAGNOSIS — Z92.3: ICD-10-CM

## 2020-11-04 DIAGNOSIS — R29.6: ICD-10-CM

## 2020-11-04 DIAGNOSIS — E78.5: ICD-10-CM

## 2020-11-04 DIAGNOSIS — Z82.49: ICD-10-CM

## 2020-11-04 DIAGNOSIS — F17.200: ICD-10-CM

## 2020-11-04 DIAGNOSIS — Z79.82: ICD-10-CM

## 2020-11-04 LAB
ADD MANUAL DIFF: NO
ALBUMIN SERPL-MCNC: 5 G/DL (ref 3.5–5)
ALP SERPL-CCNC: 124 U/L (ref 38–126)
ANION GAP SERPL CALC-SCNC: 13 MMOL/L (ref 5–19)
APPEARANCE UR: CLEAR
APTT PPP: YELLOW S
AST SERPL-CCNC: 25 U/L (ref 14–36)
BASOPHILS # BLD AUTO: 0 10^3/UL (ref 0–0.2)
BASOPHILS NFR BLD AUTO: 0.2 % (ref 0–2)
BILIRUB DIRECT SERPL-MCNC: 0.1 MG/DL (ref 0–0.4)
BILIRUB SERPL-MCNC: 0.6 MG/DL (ref 0.2–1.3)
BILIRUB UR QL STRIP: NEGATIVE
BUN SERPL-MCNC: 19 MG/DL (ref 7–20)
CALCIUM: 10.1 MG/DL (ref 8.4–10.2)
CHLORIDE SERPL-SCNC: 105 MMOL/L (ref 98–107)
CO2 SERPL-SCNC: 24 MMOL/L (ref 22–30)
EOSINOPHIL # BLD AUTO: 0 10^3/UL (ref 0–0.6)
EOSINOPHIL NFR BLD AUTO: 0.6 % (ref 0–6)
ERYTHROCYTE [DISTWIDTH] IN BLOOD BY AUTOMATED COUNT: 16.5 % (ref 11.5–14)
GLUCOSE SERPL-MCNC: 65 MG/DL (ref 75–110)
GLUCOSE UR STRIP-MCNC: NEGATIVE MG/DL
HCT VFR BLD CALC: 40.2 % (ref 36–47)
HGB BLD-MCNC: 13.2 G/DL (ref 12–15.5)
KETONES UR STRIP-MCNC: NEGATIVE MG/DL
LYMPHOCYTES # BLD AUTO: 1.6 10^3/UL (ref 0.5–4.7)
LYMPHOCYTES NFR BLD AUTO: 36.1 % (ref 13–45)
MCH RBC QN AUTO: 31.4 PG (ref 27–33.4)
MCHC RBC AUTO-ENTMCNC: 32.8 G/DL (ref 32–36)
MCV RBC AUTO: 96 FL (ref 80–97)
MONOCYTES # BLD AUTO: 0.1 10^3/UL (ref 0.1–1.4)
MONOCYTES NFR BLD AUTO: 3.2 % (ref 3–13)
NEUTROPHILS # BLD AUTO: 2.6 10^3/UL (ref 1.7–8.2)
NEUTS SEG NFR BLD AUTO: 59.9 % (ref 42–78)
NITRITE UR QL STRIP: NEGATIVE
PH UR STRIP: 5 [PH] (ref 5–9)
PLATELET # BLD: 173 10^3/UL (ref 150–450)
POTASSIUM SERPL-SCNC: 4.2 MMOL/L (ref 3.6–5)
PROT SERPL-MCNC: 8.1 G/DL (ref 6.3–8.2)
PROT UR STRIP-MCNC: NEGATIVE MG/DL
RBC # BLD AUTO: 4.19 10^6/UL (ref 3.72–5.28)
SP GR UR STRIP: 1.01
TOTAL CELLS COUNTED % (AUTO): 100 %
UROBILINOGEN UR-MCNC: NEGATIVE MG/DL (ref ?–2)
WBC # BLD AUTO: 4.3 10^3/UL (ref 4–10.5)

## 2020-11-04 PROCEDURE — 97116 GAIT TRAINING THERAPY: CPT

## 2020-11-04 PROCEDURE — 93010 ELECTROCARDIOGRAM REPORT: CPT

## 2020-11-04 PROCEDURE — 94640 AIRWAY INHALATION TREATMENT: CPT

## 2020-11-04 PROCEDURE — 70450 CT HEAD/BRAIN W/O DYE: CPT

## 2020-11-04 PROCEDURE — 71250 CT THORAX DX C-: CPT

## 2020-11-04 PROCEDURE — 93005 ELECTROCARDIOGRAM TRACING: CPT

## 2020-11-04 PROCEDURE — 71045 X-RAY EXAM CHEST 1 VIEW: CPT

## 2020-11-04 PROCEDURE — 82550 ASSAY OF CK (CPK): CPT

## 2020-11-04 PROCEDURE — 99285 EMERGENCY DEPT VISIT HI MDM: CPT

## 2020-11-04 PROCEDURE — 82962 GLUCOSE BLOOD TEST: CPT

## 2020-11-04 PROCEDURE — 84484 ASSAY OF TROPONIN QUANT: CPT

## 2020-11-04 PROCEDURE — 36415 COLL VENOUS BLD VENIPUNCTURE: CPT

## 2020-11-04 PROCEDURE — 80053 COMPREHEN METABOLIC PANEL: CPT

## 2020-11-04 PROCEDURE — 72125 CT NECK SPINE W/O DYE: CPT

## 2020-11-04 PROCEDURE — 97161 PT EVAL LOW COMPLEX 20 MIN: CPT

## 2020-11-04 PROCEDURE — 83735 ASSAY OF MAGNESIUM: CPT

## 2020-11-04 PROCEDURE — 85025 COMPLETE CBC W/AUTO DIFF WBC: CPT

## 2020-11-04 PROCEDURE — G0378 HOSPITAL OBSERVATION PER HR: HCPCS

## 2020-11-04 PROCEDURE — 81001 URINALYSIS AUTO W/SCOPE: CPT

## 2020-11-04 PROCEDURE — 73030 X-RAY EXAM OF SHOULDER: CPT

## 2020-11-04 SDOH — SOCIAL STABILITY - SOCIAL INSECURITY: DISSAPEARANCE AND DEATH OF FAMILY MEMBER: Z63.4

## 2020-11-04 NOTE — RADIOLOGY REPORT (SQ)
EXAM DESCRIPTION:  CT CERVICAL SPINE WITHOUT



IMAGES COMPLETED DATE/TIME:  11/4/2020 2:19 pm



REASON FOR STUDY:  fall, neck pain



COMPARISON:  None.



TECHNIQUE:  Axial images acquired through the cervical spine without intravenous contrast.  Images re
viewed with lung, soft tissue and bone windows.  Reconstructed coronal and sagittal MPR images review
ed.  Images stored on PACS.

All CT scanners at this facility use dose modulation, iterative reconstruction, and/or weight based d
osing when appropriate to reduce radiation dose to as low as reasonably achievable (ALARA).

[facility]: Dose Right  [facility]: CareDose    [facility]: Dose Right    [facility]: Teradose 4D    [facility]: [employer]



RADIATION DOSE:   mGy.



LIMITATIONS:  None.



FINDINGS:  ALIGNMENT: Anatomic.

MINERALIZATION: Normal.

VERTEBRAL BODIES: No fractures or dislocation.

DISCS: Multilevel disc space narrowing with osteophytes.

FACETS, LATERAL MASSES, POSTERIOR ELEMENTS: Facet arthropathy.  No fractures.  No dislocation.  No ac
aldo findings.

HARDWARE: None in the spine.

VISUALIZED RIBS: No fractures.

LUNG APICES AND SOFT TISSUES: No significant or acute findings.

OTHER: No other significant finding.



IMPRESSION:  CHRONIC DEGENERATIVE CHANGES.  NO ACUTE FINDINGS.



TECHNICAL DOCUMENTATION:  JOB ID:  [id number]

Quality ID # 436: Final reports with documentation of one or more dose reduction techniques (e.g., Au
tomated exposure control, adjustment of the mA and/or kV according to patient size, use of iterative 
reconstruction technique)

 2011 [employer]- All Rights Reserved



Reading location - IP/workstation name: [email or web address]

## 2020-11-04 NOTE — RADIOLOGY REPORT (SQ)
EXAM DESCRIPTION:  CHEST SINGLE VIEW



IMAGES COMPLETED DATE/TIME:  11/4/2020 2:26 pm



REASON FOR STUDY:  cp, falls



COMPARISON:  10/5/2020



EXAM PARAMETERS:  NUMBER OF VIEWS: One view.

TECHNIQUE: Single frontal radiographic view of the chest acquired.

RADIATION DOSE: NA

LIMITATIONS: None.



FINDINGS:  LUNGS AND PLEURA: No opacities, masses or pneumothorax. No pleural effusion.  Stable linea
r scarring in the left midlung field.

MEDIASTINUM AND HILAR STRUCTURES: No masses.  Contour normal.

HEART AND VASCULAR STRUCTURES: Heart is slightly enlarged but stable in appearance.  No failure.

BONES: No acute findings.

HARDWARE: Unchanged.

OTHER: No other significant finding.



IMPRESSION:  NO ACUTE RADIOGRAPHIC FINDING IN THE CHEST.



TECHNICAL DOCUMENTATION:  JOB ID:  [id number]

 2011 [employer]- All Rights Reserved



Reading location - IP/workstation name: [email or web address]

## 2020-11-04 NOTE — ER DOCUMENT REPORT
ED General





- General


Chief Complaint: Fall


Stated Complaint: BLOOD PRESSURE PROBLEM


Time Seen by Provider: 20 13:29


Primary Care Provider: 


LAURA BENSON PA [Primary Care Provider] - Follow up as needed


Mode of Arrival: Wheelchair


Notes: 





75-year-old female history of hypertension, hyperlipidemia, COPD, MI referred 

from PCPs office for frequent falls.  Patient says that she has been having 

falls recently but cannot attribute them to any particular cause.  Does not 

endorse tripping, dizziness, chest pain, shortness of breath, recent med changes

otherwise she says that she thinks her medications make her follow-up.  Patient 

took sleeping pill prior to arrival limiting history.  Patient endorses chronic 

pain in her left shoulder and diffuse posterior neck pain that has been present 

for several months.  Patient also complains of pain in her left thigh that she 

says started when she fell but unable to state exactly when, approximately 1 to 

few days.  Patient endorses blurry vision in her left eye for a few days.  

Patient says she has had a diffuse headache for approximately 1 day but has been

intermittent for the past month since her  .  Patient endorses 

tenderness to upper left chest. patient denies any shortness of breath, 

pleuritic chest pain, exertional chest pain, fever, vomiting, focal weakness or 

numbness, vertigo, abdominal pain, diarrhea, melena, bright red blood per 

rectum, urinary symptoms


TRAVEL OUTSIDE OF THE [place] IN LAST 30 DAYS: No





- Related Data


Allergies/Adverse Reactions: 


                                        





amoxicillin Allergy (Verified 10/27/19 11:32)


   


Penicillins Allergy (Verified 10/27/19 11:32)


   











Past Medical History





- General


Information source: Patient





- Social History


Smoking Status: Unknown if Ever Smoked


Family History: Reviewed & Not Pertinent, Hypertension





- Past Medical History


Cardiac Medical History: Reports: Hx Congestive Heart Failure, Hx Coronary 

Artery Disease, Hx Heart Attack - , Hx Hypercholesterolemia, Hx Hypertension

- ON MEDS


   Denies: Hx Atrial Fibrillation, Hx Peripheral Vascular Disease, Hx Pulmonary 

Embolism, Hx Heart Murmur


Pulmonary Medical History: Reports: Hx Bronchitis, Hx COPD, Hx Pneumonia - YRS 

AGO


   Denies: Hx Asthma, Hx Respiratory Failure, Hx Sleep Apnea, Hx Tuberculosis


Neurological Medical History: Denies: Hx Cerebrovascular Accident, Hx Seizures


Endocrine Medical History: Denies: Hx Diabetes Mellitus Type 1, Hx Diabetes 

Mellitus Type 2


Renal/ Medical History: Reports: Hx Renal Insufficiency.  Denies: Hx End Stage

Renal Disease, Hx Kidney Stones, Hx Peritoneal Dialysis


Malignancy Medical History: Denies: Hx Leukemia, Hx Lung Cancer


GI Medical History: Reports: Hx Crohn's Disease - Patient has either Crohn's 

disease or ulcerative colitis, Hx Diverticulitis, Hx Gastroesophageal Reflux 

Disease.  Denies: Hx Hepatitis, Hx Hiatal Hernia, Hx Irritable Bowel, Hx Liver 

Failure, Hx Pancreatitis, Hx Ulcer


Musculoskeletal Medical History: Reports Hx Arthritis, Denies Hx Fibromyalgia, 

Denies Hx Muscular Dystrophy


Psychiatric Medical History: Reports: Hx Depression


   Denies: Hx Bipolar Disorder, Hx Post Traumatic Stress Disorder, Hx 

Schizophrenia


Traumatic Medical History: Reports: Hx Fractures - right wrist, left foot


Infectious Medical History: Denies: Hx Hepatitis, Hx HIV


Past Surgical History: Reports: Hx Appendectomy, Hx Cardiac Catheterization, Hx 

Cardiac Surgery - AICD for ventricular fibrillation, Hx  Section - x4, 

Hx Coronary Stent, Hx Hysterectomy, Hx Orthopedic Surgery - bilateral knee 

surgery, Hx Pacemaker, Hx Tonsillectomy.  Denies: Hx Bowel Surgery, Hx 

Cholecystectomy, Hx Colostomy, Hx Coronary Artery Bypass Graft, Hx Gastric 

Bypass Surgery, Hx Herniorrhaphy, Hx Mastectomy, Hx Open Heart Surgery, Hx Tubal

Ligation





- Immunizations


Immunizations up to date: Yes


Hx Diphtheria, Pertussis, Tetanus Vaccination: Yes


Hx Pneumococcal Vaccination: 18





Review of Systems





- Review of Systems


-: Yes ROS unobtainable due to patient's medical condition - poor historian 2/2 

sleep aid





Physical Exam





- Vital signs


Vitals: 


                                        











Temp Pulse Resp BP Pulse Ox


 


 98.6 F   70   16   201/92 H  98 


 


 20 13:31  20 13:31  20 13:31  20 13:31  20 13:31














- Notes


Notes: 





PHYSICAL EXAMINATION:


 


GENERAL: Elderly woman lying in stretcher mildly somnolent in no acute distress


 


HEAD: Atraumatic, normocephalic.


 


EYES: Pupils equal round and reactive, constricted bilaterally, no gross signs 

of trauma, no conjunctival injection, bilateral cataract surgery, funduscopic 

exam limited by constricted pupils, bilateral intraocular pressure with Ismael-Pen

measuring 25-30


 


ENT: nares patent, moist mucous membranes.


 


NECK/BACK: Normal range of motion, supple without lymphadenopathy, no C/T/L/S-

spine tenderness or deformity


 


LUNGS: Breath sounds clear to auscultation bilaterally and equal.  No wheezes 

rales or rhonchi.  Normal respiratory rate and effort


 


HEART: Regular rate and rhythm with faint systolic murmur


 


ABDOMEN: Soft, nontender, no guarding, no masses, no CVAT


 


EXTREMITIES: Normal range of motion, no pitting or edema.  No cyanosis.


 


NEUROLOGICAL: Awake, alert, conversing appropriately, moves all extremities 

spontaneously, 5-5 strength in all extremities, normal sensation in all 

extremities, normal finger-to-nose bilaterally, cranial nerves II through XII 

grossly intact bilaterally by exam limited by patient participation


 


SKIN: Warm, Dry





- HEENT


Visual acuity- Right eye: 2


Visual acuity- Left eye: 3


Visual acuity- Both eyes: 2


Corrective lenses worn: Yes





Course





- Re-evaluation


Re-evalutation: 





20 22:07


Patient with frequent recent falls possibly secondary to polypharmacy, possibly 

aggravated by use of sleep aids.  Patient had headache earlier that has now 

improved, was seen in primary care's office and referred to ED apparently for 

blood pressure control.  No signs of trauma on head to toe fully undressed 

trauma evaluation, chest pain atypical for ACS but obtain EKG which shows 

precordial T wave versions are not significantly dynamic from numerous prior 

EKGs and initial troponin negative.  Obtained CT head and C spine without any 

emergent findings and x-rays of chest left shoulder also with no acute findings.

 Consulted ophthalmologist Dr. Mejia for eye pain with blurry vision, 

possible new onset glaucoma, no signs of acute angle-closure glaucoma, no signs 

of trauma.  Will admit patient to Dr. Morales given patient is unsafe for 

discharge until falls are more thoroughly evaluated and chest pain is further 

investigated.  No signs of PE at this time, will defer to inpatient team to 

reevaluate need to rule out with testing or imaging if clinical picture should 

change.


20 23:15


Patient Dr. Morales  at 10 PM, no call back yet, asked the 

 to page him again.


20 23:19


Discussed patient with Dr. Morales, Dr. Morales agreed to take patient as I was 

concerned with patient being at risk for having a fall at home if I discharged 

her as patient will not currently even ambulate for me because she feels 

unsteady.  Likely secondary to her sleep aid versus her other medications, will 

put her in telemetry observation under Dr. Morales's care at least overnight and 

she will have ophthalmology see her tomorrow and have her gait assessed in the 

morning.





- Vital Signs


Vital signs: 


                                        











Temp Pulse Resp BP Pulse Ox


 


 98.8 F   67   20   127/70 H  100 


 


 20 18:21  20 18:21  20 20:01  20 20:01  20 20:01














- Laboratory


Result Diagrams: 


                                 20 15:25





                                 20 15:25


Laboratory results interpreted by me: 


                                        











  20





  15:25 15:25 19:45


 


RDW  16.5 H  


 


Est GFR (MDRD) Non-Af   51 L 


 


Glucose   65 L 


 


Urine Blood    SMALL H


 


Ur Leukocyte Esterase    TRACE H














- EKG Interpretation by Me


Additional EKG results interpreted by me: 





20 22:11


Sinus rhythm, no significant ST elevations or depressions, marked precordial T 

wave inversions not significantly changed from prior EKGs, 





Discharge





- Discharge


Clinical Impression: 


 Unsteady gait, Microscopic hematuria





Hypertension


Qualifiers:


 Hypertension type: essential hypertension Qualified Code(s): I10 - Essential 

(primary) hypertension





Increased intraocular pressure


Qualifiers:


 Laterality: bilateral Qualified Code(s): H40.053 - Ocular hypertension, 

bilateral





Headache


Qualifiers:


 Headache type: unspecified Headache chronicity pattern: episodic headache 

Intractability: not intractable Qualified Code(s): R51.9 - Headache, unspecified





Disposition: ADMITTED AS OBSERVATION


Admitting Provider: Andrew


Unit Admitted: Telemetry


Referrals: 


LAURA BENSON PA [Primary Care Provider] - Follow up as needed

## 2020-11-04 NOTE — ER DOCUMENT REPORT
ED Medical Screen (RME)





- General


Chief Complaint: Blood Pressure Problem


Stated Complaint: BLOOD PRESSURE PROBLEM


Time Seen by Provider: 20 13:29


Primary Care Provider: 


LAURA BENSON PA [Primary Care Provider] - Follow up as needed


Mode of Arrival: Wheelchair


Information source: Patient


Notes: 





Patient presents complaining of elevated blood pressure reading with multiple 

falls.  Patient states she went to see her doctor today and her blood pressure 

was elevated.  Her doctor advised her to come here for further evaluation.  

Patient states she is had multiple falls with the most recent yesterday.  

Patient complains of headache pain, neck pain and left shoulder pain.  Patient 

states that the pain does go into the upper anterior chest area.  Patient has 

underlying history of hypertension, dyslipidemia, MI, COPD and GERD.





I have greeted and performed a rapid initial assessment of this patient.  A 

comprehensive ED assessment and evaluation of the patient, analysis of test 

results and completion of the medical decision making process will be conducted 

by additional ED providers.


TRAVEL OUTSIDE OF THE [place] IN LAST 30 DAYS: No





- Related Data


Allergies/Adverse Reactions: 


                                        





amoxicillin Allergy (Verified 10/27/19 11:32)


   


Penicillins Allergy (Verified 10/27/19 11:32)


   











Past Medical History





- Past Medical History


Cardiac Medical History: Reports: Hx Congestive Heart Failure, Hx Coronary 

Artery Disease, Hx Heart Attack - , Hx Hypercholesterolemia, Hx Hypertension

- ON MEDS


   Denies: Hx Atrial Fibrillation, Hx Peripheral Vascular Disease, Hx Pulmonary 

Embolism, Hx Heart Murmur


Pulmonary Medical History: Reports: Hx Bronchitis, Hx COPD, Hx Pneumonia - YRS 

AGO


   Denies: Hx Asthma, Hx Respiratory Failure, Hx Sleep Apnea, Hx Tuberculosis


Neurological Medical History: Denies: Hx Cerebrovascular Accident, Hx Seizures


Endocrine Medical History: Denies: Hx Diabetes Mellitus Type 1, Hx Diabetes 

Mellitus Type 2


Renal/ Medical History: Reports: Hx Renal Insufficiency.  Denies: Hx End Stage

Renal Disease, Hx Kidney Stones, Hx Peritoneal Dialysis


Malignancy Medical History: Denies: Hx Leukemia, Hx Lung Cancer


GI Medical History: Reports: Hx Crohn's Disease - Patient has either Crohn's 

disease or ulcerative colitis, Hx Diverticulitis, Hx Gastroesophageal Reflux 

Disease.  Denies: Hx Hepatitis, Hx Hiatal Hernia, Hx Irritable Bowel, Hx Liver 

Failure, Hx Pancreatitis, Hx Ulcer


Musculoskeltal Medical History: Reports Hx Arthritis, Denies Hx Fibromyalgia, 

Denies Hx Muscular Dystrophy


Psychiatric Medical History: Reports: Hx Depression


   Denies: Hx Bipolar Disorder, Hx Post Traumatic Stress Disorder, Hx Schizop

hrenia


Traumatic Medical History: Reports: Hx Fractures - right wrist, left foot


Infectious Medical History: Denies: Hx Hepatitis, Hx HIV


Past Surgical History: Reports: Hx Appendectomy, Hx Cardiac Catheterization, Hx 

Cardiac Surgery - AICD for ventricular fibrillation, Hx  Section - x4, 

Hx Coronary Stent, Hx Hysterectomy, Hx Orthopedic Surgery - bilateral knee 

surgery, Hx Pacemaker, Hx Tonsillectomy.  Denies: Hx Bowel Surgery, Hx 

Cholecystectomy, Hx Colostomy, Hx Coronary Artery Bypass Graft, Hx Gastric 

Bypass Surgery, Hx Herniorrhaphy, Hx Mastectomy, Hx Open Heart Surgery, Hx Tubal

Ligation





- Immunizations


Immunizations up to date: Yes


Hx Diphtheria, Pertussis, Tetanus Vaccination: Yes





Physical Exam





- Vital signs


Vitals: 





                                        











Temp Pulse Resp BP Pulse Ox


 


 98.6 F   70   16   201/92 H  98 


 


 20 13:31  20 13:31  20 13:31  20 13:31  20 13:31














- Respiratory


Respiratory status: No respiratory distress


Chest status: Tender - Left upper anterior chest wall tenderness





- Neurological


Neuro grossly intact: Yes


Orientation: AAOx4


Olvin Coma Scale Eye Opening: Spontaneous


Olvin Coma Scale Verbal: Oriented


Stafford Coma Scale Motor: Obeys Commands


Olvin Coma Scale Total: 15





Course





- Vital Signs


Vital signs: 





                                        











Temp Pulse Resp BP Pulse Ox


 


 98.6 F   70   16   201/92 H  98 


 


 20 13:31  20 13:31  20 13:31  20 13:31  20 13:31














Doctor's Discharge





- Discharge


Referrals: 


LAURA BENSON PA [Primary Care Provider] - Follow up as needed

## 2020-11-04 NOTE — EKG REPORT
SEVERITY:- ABNORMAL ECG -

SINUS RHYTHM

REPOL ABNRM, PROBABLE ISCHEMIA, ANT-LAT LEADS

:

Confirmed by: Hari Meadows MD 04-Nov-2020 19:37:50

## 2020-11-04 NOTE — RADIOLOGY REPORT (SQ)
EXAM DESCRIPTION:  CT HEAD WITHOUT



IMAGES COMPLETED DATE/TIME:  11/4/2020 2:19 pm



REASON FOR STUDY:  HTN, HA, fall



COMPARISON:  None.



TECHNIQUE:  Axial images acquired through the brain without intravenous contrast.  Images reviewed wi
th bone, brain and subdural windows.  Additional sagittal and coronal reconstructions were generated.
 Images stored on PACS.

All CT scanners at this facility use dose modulation, iterative reconstruction, and/or weight based d
osing when appropriate to reduce radiation dose to as low as reasonably achievable (ALARA).

[facility]: Dose Right  [facility]: CareDose    [facility]: Dose Right    [facility]: Teradose 4D    [facility]: Smart Technologies



RADIATION DOSE:   mGy.



LIMITATIONS:  None.



FINDINGS:  VENTRICLES: Prominent.

CEREBRUM: No masses.  No hemorrhage.  No midline shift.  Areas of low density in the white matter mos
t likely due to chronic micro-vascular ischemic change.  No evidence for acute infarction.

CEREBELLUM: No masses.  No hemorrhage.  No alteration of density.  No evidence for acute infarction.

EXTRAAXIAL SPACES: Mild age-related involutional change.  No fluid collections.  No masses.

ORBITS AND GLOBE: No intra- or extraconal masses.  Normal contour of globe without masses.

CALVARIUM: No fracture.

PARANASAL SINUSES: No fluid or mucosal thickening.

SOFT TISSUES: No mass or hematoma.

OTHER: No other significant finding.



IMPRESSION:  MILD CHRONIC CHANGES OF ATROPHY AND MICROVASCULAR ISCHEMIA.  NO ACUTE PROCESS.

EVIDENCE OF ACUTE STROKE: NO.



TECHNICAL DOCUMENTATION:  JOB ID:  [id number]

Quality ID # 436: Final reports with documentation of one or more dose reduction techniques (e.g., Au
tomated exposure control, adjustment of the mA and/or kV according to patient size, use of iterative 
reconstruction technique)

 2011 [employer]- All Rights Reserved



Reading location - IP/workstation name: [email or web address]

## 2020-11-04 NOTE — RADIOLOGY REPORT (SQ)
EXAM DESCRIPTION:  SHOULDER LEFT 2 OR MORE VIEWS



IMAGES COMPLETED DATE/TIME:  11/4/2020 2:26 pm



REASON FOR STUDY:  fall, L shoulder pain



COMPARISON:  None.



NUMBER OF VIEWS:  Three view.



TECHNIQUE:  Internal rotation, external rotation, and Y view images acquired of the left shoulder.



LIMITATIONS:  None.



FINDINGS:  MINERALIZATION: Normal.

BONES: No acute fracture. No worrisome bone lesions. No significant osteophytes.

GLENOHUMERAL JOINT: Humeral head sits high in position consistent with chronic rotator cuff injury.

ACROMIOCLAVICULAR JOINT: No large osteophytes.

SOFT TISSUES: No calcifications.

VISUALIZED RIBS, SPINE, AND LUNG: No other significant finding.

OTHER: No other significant finding.



IMPRESSION:  Degenerative changes most marked in the glenohumeral joint.  Humeral head sits I am posi
tion.  This may represent a chronic rotator cuff injury.



TECHNICAL DOCUMENTATION:  JOB ID:  [id number]

 2011 [employer]- All Rights Reserved



Reading location - IP/workstation name: [email or web address]

## 2020-11-05 RX ADMIN — AMLODIPINE BESYLATE SCH MG: 5 TABLET ORAL at 09:20

## 2020-11-05 RX ADMIN — CARVEDILOL SCH MG: 6.25 TABLET, FILM COATED ORAL at 21:18

## 2020-11-05 RX ADMIN — IPRATROPIUM BROMIDE AND ALBUTEROL SULFATE PRN ML: 2.5; .5 SOLUTION RESPIRATORY (INHALATION) at 16:27

## 2020-11-05 RX ADMIN — FAMOTIDINE SCH MG: 20 TABLET, FILM COATED ORAL at 21:18

## 2020-11-05 RX ADMIN — FAMOTIDINE SCH MG: 20 TABLET, FILM COATED ORAL at 09:20

## 2020-11-05 RX ADMIN — CARVEDILOL SCH MG: 6.25 TABLET, FILM COATED ORAL at 09:20

## 2020-11-05 RX ADMIN — IPRATROPIUM BROMIDE AND ALBUTEROL SULFATE PRN ML: 2.5; .5 SOLUTION RESPIRATORY (INHALATION) at 22:00

## 2020-11-05 RX ADMIN — AMLODIPINE BESYLATE SCH MG: 5 TABLET ORAL at 17:08

## 2020-11-05 NOTE — RADIOLOGY REPORT (SQ)
EXAM DESCRIPTION:  CT CHEST WITHOUT



IMAGES COMPLETED DATE/TIME:  11/5/2020 9:29 am



REASON FOR STUDY:  stage 2lung cancer



COMPARISON:  CT 7/24/2020 chest x-ray 11/4/2020



TECHNIQUE:  CT scan performed of the chest without intravenous contrast.  Images reviewed with lung, 
soft tissue and bone windows.  Reconstructed coronal and sagittal MPR images reviewed.  All images st
ored on PACS.

All CT scanners at this facility use dose modulation, iterative reconstruction, and/or weight based d
osing when appropriate to reduce radiation dose to as low as reasonably achievable (ALARA).

[facility]: Dose Right  [facility]: CareDose    [facility]: Dose Right    [facility]: Teradose 4D    [facility]: Smart [employer]



RADIATION DOSE:  CT Rad equipment meets quality standard of care and radiation dose reduction techniq
ues were employed. CTDIvol: 7.3 mGy. DLP: 262 mGy-cm. mGy.



LIMITATIONS:  No technical limitations.



FINDINGS:  LUNGS AND PLEURA: There is persistent scarring in the left upper lobe.  The only interval 
change is that there is now a slight nodular thickening of the fissure on the left.  A definable nodu
le can be seen on image 42 measuring 6.4 x 10.1 mm.

HILAR AND MEDIASTINAL STRUCTURES: No identified masses or abnormal nodes.  No obvious aneurysm.

HEART AND VASCULAR STRUCTURES: No aneurysm.  No pericardial effusion.

UPPER ABDOMEN: No significant findings.  Limited exam.

THYROID AND OTHER SOFT TISSUES: No masses.  No adenopathy.

BONES: No significant finding.

HARDWARE: Pacemaker/defibrillator.  Epicardial pacemaker.

OTHER: No other significant findings.



IMPRESSION:  1.  Stable scarring in the left upper lobe.

2.  There now appears to be a slight nodular thickening of the nearby fissure as described.  Consider
 PET-CT.



TECHNICAL DOCUMENTATION:  JOB ID:  [id number]

Quality ID # 436: Final reports with documentation of one or more dose reduction techniques (e.g., Au
tomated exposure control, adjustment of the mA and/or kV according to patient size, use of iterative 
reconstruction technique)

 2011 [employer]- All Rights Reserved



Reading location - IP/workstation name: [email or web address]

## 2020-11-05 NOTE — PDOC CONSULTATION
Consultation


Consult Date: 20


Attending physician:: BISMARK CABALLERO


Provider Consulted: TABATHA MEMBRENO


Consult reason:: Frequent falls





History of Present Illness


Admission Date/PCP: 


  20 23:47





  WILL VARGHESE





Patient complains of: Frequent falls


History of Present Illness: 


JAYCEE FLOWERS is a 75 year old female


With the following active problems


1.  Coronary artery disease-PCI 2018


2.  Systemic hypertension


3.  Low hello dyslipidemia


4.  Ventricular tachycardia


5.  Left-sided [employer] ICD-2011


6.  Lung cancer status post [employer]


7.  Nicotine dependence





Patient has been admitted to the hospital after being found in the primary care 

physician's office to be significantly hypertensive.  It appears that she had s

topped all her medications after recent loss of the family.  At the time of my 

evaluation patient is talkative and seems like her usual self.  She does not 

endorse any chest pain or dyspnea.  She mentions that she has had repeated falls

sometimes as many as 4 in the right.  She does not report any loss of 

consciousness.  There is no symptoms of dizziness but she feels weak and then 

she goes down to the floor.  She does not recall any discharge from the ICD.





Since admission she has done well and has been walking with a walker and there 

have been no mention of any falls.  Her blood pressure is also been corrected.  

Initial evaluation and work-up in the ED including CT scans and imaging studies 

have been inconclusive.  Chest CT did not show any pericardial effusion.











Past Medical History


Cardiac Medical History: Reports: Congestive Heart Failure, Coronary Artery 

Disease, Myocardial Infarction - , Hyperlipidema, Hypertension - ON MEDS


   Denies: Atrial Fibrillation, Peripheral Vascular Disease, Pulmonary Embolism,

Heart Murmur


Pulmonary Medical History: Reports: Bronchitis, Chronic Obstructive Pulmonary 

Disease (COPD), Pneumonia - YRS AGO


   Denies: Asthma, Respiratory Failure, Sleep Apnea, Tuberculosis


Neurological Medical History: 


   Denies: Seizures


Endocrine Medical History: 


   Denies: Diabetes Mellitus Type 1, Diabetes Mellitus Type 2


Renal/ Medical History: 


   Denies: End Stage Renal Disease


Malignancy Medical History: Reports: Lung Cancer


   Denies: Leukemia


GI Medical History: Reports: Crohn's Disease - Patient has either Crohn's 

disease or ulcerative colitis, Diverticulitis, Gastroesophageal Reflux Disease


   Denies: Hepatitis, Hiatal Hernia


Musculoskeltal Medical History: Reports: Arthritis


   Denies: Fibromyalgia


Psychiatric Medical History: Reports: Depression


   Denies: Bipolar Disorder, Post Traumatic Stress Disorder


Hematology: 


   Denies: Anemia, Hemophilia, Sickle Cell Disease


Infectious Medical History: 


   Denies: HIV





Past Surgical History


Past Surgical History: Reports: Appendectomy, Cardiac Catheterization,  

Section - x4, Coronary Stent, Hysterectomy, Orthopedic Surgery - bilateral knee 

surgery, Pacemaker, Tonsillectomy


   Denies: Amputation, Cholecystectomy, Colostomy, Coronary Artery Bypass Graft,

Gastric Bypass Surgery, Herniorrhaphy, Mastectomy, Tubal Ligation





Social History


Smoking Status: Current Every Day Smoker


Cigarettes Packs Per Day: 1


Electronic Cigarette use?: No


Number of Years Smokin


Last Time Smoked: 2 days ago


Frequency of Alcohol Use: None


Hx Recreational Drug Use: No


Drugs: None


Hx Prescription Drug Abuse: No





Family History


Family History: Reviewed & Not Pertinent, Hypertension


Parental Family History Reviewed: Yes - No familial illnesses


Children Family History Reviewed: NA


Sibling(s) Family History Reviewed.: NA





Medication/Allergy


Home Medications: 








Amlodipine Besylate [Norvasc 5 mg Tablet] 5 mg PO Q12 18 


Aspirin [Aspirin EC] 81 mg PO DAILY 18 


Atorvastatin Calcium [Lipitor 40 mg Tablet] 40 mg PO QHS 18 


Escitalopram Oxalate [Lexapro 10 mg Tablet] 10 mg PO DAILY 18 


Isosorbide Mononitrate [Imdur 60 mg Tablet.er] 60 mg PO QAM 18 


Nitroglycerin [Nitrostat 0.4 mg (1/150 Gr) Tabs 25/Bottle] 1 tab SL Q5MP PRN 

12/10/18 


Clopidogrel Bisulfate [Plavix 75 mg Tablet] 75 mg PO DAILY 19 


Carvedilol [Coreg 12.5 mg Tablet] 12.5 mg PO Q12 20 


Cholecalciferol (Vitamin D3) [Vitamin D3 1000 Unit Tablet] 2,000 unit PO DAILY 

20 


Famotidine [Pepcid 20 mg Tablet] 20 mg PO QHS 20 


Fluticasone/Umeclidin/Vilanter [Trelegy 100-62.5-25 Mcg Ellipta 14 Dose/Dpi] 1 

puff IH DAILY 20 


Latanoprost/Pf [Latanoprost 0.005% Eye Drop] 1 drop OU QHS 11/05/20 


Pantoprazole Sodium [Protonix 40 mg Dr Tablet] 40 mg PO DAILY 20 








Allergies/Adverse Reactions: 


                                        





amoxicillin Allergy (Verified 20 09:13)


   


Penicillins Allergy (Verified 20 09:13)


   











Review of Systems


Constitutional: PRESENT: as per HPI


Eyes: PRESENT: as per HPI


Ears: PRESENT: as per HPI


Respiratory: ABSENT: as per HPI, cough, dyspnea, hemoptysis, sputum, other


Gastrointestinal: ABSENT: as per HPI, abdominal pain, bloating, coffee ground 

emesis, constipation, diarrhea, dysphagia, heartburn, hematemesis, hematochezia,

melena, nausea, vomiting, other


Neurological: PRESENT: other - Frequent falls





Physical Exam


Vital Signs: 


                                        











Temp Pulse Resp BP Pulse Ox


 


 98.1 F   63   17   145/71 H  98 


 


 20 16:56  20 16:56  20 16:56  20 16:56  20 16:56








                                 Intake & Output











 20





 06:59 06:59 06:59


 


Intake Total  300 240


 


Balance  300 240


 


Weight  62.6 kg 











General appearance: PRESENT: no acute distress, cooperative, well-developed, 

well-nourished


Head exam: PRESENT: atraumatic, normocephalic


Eye exam: PRESENT: conjunctiva pink, EOMI


Mouth exam: PRESENT: moist


Neck exam: PRESENT: full ROM


Respiratory exam: PRESENT: clear to auscultation ximena, symmetrical, unlabored


Cardiovascular exam: PRESENT: RRR, +S1, +S2, other - ICD implant site left chest

wall is free from erythema, edema or excoriation.  No edema is noted or hematoma


Pulses: PRESENT: normal radial pulses


GI/Abdominal exam: PRESENT: soft


Rectal exam: PRESENT: deferred


Neurological exam: PRESENT: alert, awake, oriented to person, oriented to place,

oriented to time, oriented to situation


Psychiatric exam: PRESENT: appropriate affect


Skin exam: PRESENT: dry, intact, normal color





Results


Laboratory Results: 


                                        





                                 20 15:25 





                                 20 15:25 





                                        











  20





  19:45


 


Urine Color  YELLOW


 


Urine Appearance  CLEAR


 


Urine pH  5.0


 


Ur Specific Gravity  1.010


 


Urine Protein  NEGATIVE


 


Urine Glucose (UA)  NEGATIVE


 


Urine Ketones  NEGATIVE


 


Urine Blood  SMALL H


 


Urine Nitrite  NEGATIVE


 


Ur Leukocyte Esterase  TRACE H


 


Urine WBC (Auto)  2


 


Urine RBC (Auto)  1








                                        











  20





  15:25 20:39 05:29


 


Creatine Kinase    34


 


Troponin I  < 0.012  0.022 














  20





  05:29


 


Creatine Kinase 


 


Troponin I  0.012











EKG Comments: 





Transthoracic echocardiogram





2020


No pericardial effusion is noted





Transthoracic echocardiogram 3/15/2018 tentatively ejection fraction 60 to 65%


Mild aortic insufficiency.  Mild mitral regurgitation


Cardiac catheterization 2018


Successful PCI to the PDA


Proximal LAD IFR equals 0.92





Implants


[employer] ICD-left-sided 2011 implant date


Model number taking Jankymberlyt 100 feet 1020 #[id number]


Single-chamber ICD.


Interrogated today.


Presenting rhythm is ventricular sensed 58 bpm


Underlying rhythm is sinus rhythm at 50 bpm





Battery is good for 4 years





Programmed parameters VVI 40 and VF and VT zones to zone VT programming.  Note 

therapies and no clinical arrhythmia.  Occasional nonsustained VT episodes.





Sensed R waves 6.4 mV


Pacing impedance 490 ohms


Shock lead impedance 43 ohms


Ventricular threshold 0.5 V at 0.5 ms


No alerts.





Telemetry presently shows sinus bradycardia at 58 bpm





Twelve-lead EKG 2020.  Independently reviewed by me.


Sinus rhythm, PVC, 58 bpm, T inversion anterolateral leads





Cervical spine CT chronic degenerative changes no acute findings





Head CT mild chronic changes.  No acute process no evidence of stroke





Creatinine 1.05


Potassium 4.2


Troponin level indeterminate


Less than 0.012


0.022


0.012


Impressions: 


                                        





Cervical Spine CT  20 13:37


IMPRESSION:  CHRONIC DEGENERATIVE CHANGES.  NO ACUTE FINDINGS.


 








Chest X-Ray  20 13:37


IMPRESSION:  NO ACUTE RADIOGRAPHIC FINDING IN THE CHEST.


 








Head CT  20 13:37


IMPRESSION:  MILD CHRONIC CHANGES OF ATROPHY AND MICROVASCULAR ISCHEMIA.  NO 

ACUTE PROCESS.


EVIDENCE OF ACUTE STROKE: NO.


 








Shoulder X-Ray  20 13:37


IMPRESSION:  Degenerative changes most marked in the glenohumeral joint.  

Humeral head sits I am position.  This may represent a chronic rotator cuff 

injury.


 








Chest CT  20 00:00


IMPRESSION:  1.  Stable scarring in the left upper lobe.


2.  There now appears to be a slight nodular thickening of the nearby fissure as

described.  Consider PET-CT.


 














Assessment & Plan





- Diagnosis


(1) HTN (hypertension)


Qualifiers: 


   Hypertension type: essential hypertension   Qualified Code(s): I10 - 

Essential (primary) hypertension   


Is this a current diagnosis for this admission?: Yes   


Plan: 


Hypertensive encephalopathy probably resulted in her presentation.


Blood pressure was significantly elevated in the doctor's office and since 

admission it is much better controlled


We will continue present regimen especially since noncompliance was responsible 

for increase in blood pressure.











(2) AICD (automatic cardioverter/defibrillator) present


Is this a current diagnosis for this admission?: Yes   


Plan: 


[employer] ICD was irrigated in the hospital and showed normal device 

function and no clinical arrhythmia


Patient is not pacemaker dependent and there is no evidence to suggest 

bradycardia.


Normal device function normal lead impedance normal threshold.


Battery life is also good.  Occasional episodes of nonsustained ventricular 

tachycardia which did not result in therapy and would unlikely result in the 

symptoms including frequent falls.  In fact there is no report of syncope or ICD

discharge.











(3) CAD S/P percutaneous coronary angioplasty


Is this a current diagnosis for this admission?: Yes   


Plan: 


Presentation is not consistent with myocardial ischemia


Given coronary artery disease with intervention in the past would recommend 

continued guideline directed medical therapy for coronary disease including dual

antiplatelet therapy if feasible.


Patient has been on aspirin and clopidogrel in the past and this should be 

continued.











(4) Frequent falls


Is this a current diagnosis for this admission?: Yes   


Plan: 


Extremely poor historian


Episodes of falls seem to be mechanical and probably ataxic.  Although this is 

not very clear.


Not sure if patient has any neuropathy following chemotherapy


Adrenal insufficiency with hypotension is probably in the differential.


Patient is having normal potassium levels.








(5) Lung cancer


Qualifiers: 


   Laterality: unspecified laterality 


Is this a current diagnosis for this admission?: Yes   


Plan: 


Status post therapy.  CT chest shows no pericardial effusion and only scarring








- Notes


Notes: 





Continue carvedilol 6.25 mg twice daily


Continue amlodipine 5 mg twice daily


Continue aspirin 81 mg daily


Continue atorvastatin 20 mg daily

## 2020-11-05 NOTE — EKG REPORT
SEVERITY:- ABNORMAL ECG -

SINUS RHYTHM

VENTRICULAR PREMATURE COMPLEX

ABNORMAL T, CONSIDER ISCHEMIA, ANT-LAT LEADS

:

Confirmed by: Hari Meadows MD 05-Nov-2020 17:59:48

## 2020-11-05 NOTE — PDOC H&P
History of Present Illness


Admission Date/PCP: 


  20 23:47





  WILL VARGHESE





Patient complains of: Uncontrolled blood pressures fall


History of Present Illness: 


JAYCEE BOURGEOIS BLOCKER is a 75 year old female


Is a 75-year-old female's with a significant history of the hypertension 

hyperlipidemia chronic smoker COPD lung cancer status post [employer]ife history of

the pacemaker coronary artery disease and a super noncomplianceRecently lost her

 in the last 3 weeks patient was not taking any single medications came 

to the office yesterday and patient's blood pressure was 230/120 and patient was

sent to the emergency department


In the ER patient was CT of the head was negative patient have a CT of the C-

spine chest x-ray and blood work was all stable


Received a 1 dose of clonidine and blood pressure is coming down to 150 range 

patient also received the amlodipine


Today's when I saw the patient's denied any chest pain no short of breath no 

headache no weakness


Again discussed with the patient's daughter regarding the patient's current 

conditions she knew that the patient is not follow nothing much she can offer if

the patient do not want to do anything


Discussed with the patient again myself and suggest that compliance of the 

medications


Patient CT of the chest was done was all stable we consult the oncology


We consult the cardiology for the pacemaker interrogations and further evaluate 

for this ongoing dizziness issue which patients follow cardiology at [facility] 

patient also seen by neurology patient seen by the ENT and has several work-up 

done





Past Medical History


Cardiac Medical History: Reports: Congestive Heart Failure, Coronary Artery 

Disease, Myocardial Infarction - , Hyperlipidema, Hypertension - ON MEDS


   Denies: Atrial Fibrillation, Peripheral Vascular Disease, Pulmonary Embolism,

Heart Murmur


Pulmonary Medical History: Reports: Bronchitis, Chronic Obstructive Pulmonary 

Disease (COPD), Pneumonia - YRS AGO


   Denies: Asthma, Respiratory Failure, Sleep Apnea, Tuberculosis


Neurological Medical History: 


   Denies: Seizures


Endocrine Medical History: 


   Denies: Diabetes Mellitus Type 1, Diabetes Mellitus Type 2


Renal/ Medical History: 


   Denies: End Stage Renal Disease


Malignancy Medical History: Reports: Lung Cancer


   Denies: Leukemia


GI Medical History: Reports: Crohn's Disease - Patient has either Crohn's 

disease or ulcerative colitis, Diverticulitis, Gastroesophageal Reflux Disease


   Denies: Hepatitis, Hiatal Hernia


Musculoskeltal Medical History: Reports: Arthritis


   Denies: Fibromyalgia


Psychiatric Medical History: Reports: Depression


   Denies: Bipolar Disorder, Post Traumatic Stress Disorder


Hematology: 


   Denies: Anemia, Hemophilia, Sickle Cell Disease


Infectious Medical History: 


   Denies: HIV





Past Surgical History


Past Surgical History: Reports: Appendectomy, Cardiac Catheterization,  

Section - x4, Coronary Stent, Hysterectomy, Orthopedic Surgery - bilateral knee 

surgery, Pacemaker, Tonsillectomy


   Denies: Amputation, Cholecystectomy, Colostomy, Coronary Artery Bypass Graft,

Gastric Bypass Surgery, Herniorrhaphy, Mastectomy, Tubal Ligation





Social History


Information Source: Patient


Smoking Status: Current Every Day Smoker


Cigarettes Packs Per Day: 1


Electronic Cigarette use?: No


Number of Years Smokin


Last Time Smoked: 2 days ago


Frequency of Alcohol Use: None


Hx Recreational Drug Use: No


Drugs: None


Hx Prescription Drug Abuse: No





Family History


Family History: Reviewed & Not Pertinent, Hypertension


Parental Family History Reviewed: Yes


Children Family History Reviewed: Yes


Sibling(s) Family History Reviewed.: Yes





Medication/Allergy


Home Medications: 








Amlodipine Besylate [Norvasc 5 mg Tablet] 5 mg PO Q12 18 


Aspirin [Aspirin EC] 81 mg PO DAILY 18 


Atorvastatin Calcium [Lipitor 40 mg Tablet] 40 mg PO QHS 18 


Escitalopram Oxalate [Lexapro 10 mg Tablet] 10 mg PO DAILY 18 


Isosorbide Mononitrate [Imdur 60 mg Tablet.er] 60 mg PO QAM 18 


Nitroglycerin [Nitrostat 0.4 mg (1/150 Gr) Tabs 25/Bottle] 1 tab SL Q5MP PRN 

12/10/18 


Clopidogrel Bisulfate [Plavix 75 mg Tablet] 75 mg PO DAILY 19 


Carvedilol [Coreg 12.5 mg Tablet] 12.5 mg PO Q12 20 


Cholecalciferol (Vitamin D3) [Vitamin D3 1000 Unit Tablet] 2,000 unit PO DAILY 

20 


Famotidine [Pepcid 20 mg Tablet] 20 mg PO QHS 20 


Fluticasone/Umeclidin/Vilanter [Trelegy 100-62.5-25 Mcg Ellipta 14 Dose/Dpi] 1 

puff IH DAILY 20 


Latanoprost/Pf [Latanoprost 0.005% Eye Drop] 1 drop OU QHS 20 


Pantoprazole Sodium [Protonix 40 mg Dr Tablet] 40 mg PO DAILY 20 








Allergies/Adverse Reactions: 


                                        





amoxicillin Allergy (Verified 20 09:13)


   


Penicillins Allergy (Verified 20 09:13)


   











Review of Systems


Constitutional: ABSENT: chills, fever(s), headache(s), weight gain, weight loss


Eyes: ABSENT: visual disturbances


Ears: ABSENT: hearing changes


Cardiovascular: ABSENT: chest pain, dyspnea on exertion, edema, orthropnea, 

palpitations


Respiratory: ABSENT: cough, hemoptysis


Gastrointestinal: ABSENT: abdominal pain, constipation, diarrhea, hematemesis, 

hematochezia, nausea, vomiting


Genitourinary: ABSENT: dysuria, hematuria


Musculoskeletal: ABSENT: joint swelling


Integumentary: ABSENT: rash, wounds


Neurological: ABSENT: abnormal gait, abnormal speech, confusion, dizziness, 

focal weakness, syncope


Psychiatric: ABSENT: anxiety, depression, homidical ideation, suicidal ideation


Endocrine: ABSENT: cold intolerance, heat intolerance, menstrual abnormalities, 

polydipsia, polyuria


Hematologic/Lymphatic: ABSENT: easy bleeding, easy bruising, lymphadenopathy





Physical Exam


Vital Signs: 


                                        











Temp Pulse Resp BP Pulse Ox


 


 98.0 F   62   15   142/66 H  100 


 


 20 11:26  20 11:26  20 11:26  20 11:26  20 11:26








                                 Intake & Output











 20





 06:59 06:59 06:59


 


Intake Total  300 240


 


Balance  300 240


 


Weight  62.6 kg 











General appearance: PRESENT: no acute distress, well-developed, well-nourished


Head exam: PRESENT: atraumatic, normocephalic


Eye exam: PRESENT: conjunctiva pink, EOMI, PERRLA.  ABSENT: scleral icterus


Ear exam: PRESENT: normal external ear exam


Mouth exam: PRESENT: moist, tongue midline


Neck exam: PRESENT: full ROM.  ABSENT: carotid bruit, JVD, lymphadenopathy, 

thyromegaly


Respiratory exam: PRESENT: clear to auscultation ximena


Cardiovascular exam: PRESENT: RRR.  ABSENT: diastolic murmur, rubs, systolic 

murmur


Pulses: PRESENT: normal dorsalis pedis pul, +2 pedal pulses bilateral


Vascular exam: PRESENT: normal capillary refill


GI/Abdominal exam: PRESENT: normal bowel sounds, soft.  ABSENT: distended, 

guarding, mass, organolmegaly, rebound, tenderness


Rectal exam: PRESENT: deferred


Musculoskeletal exam: PRESENT: ambulatory


Neurological exam: PRESENT: alert, awake, oriented to person, oriented to place,

oriented to time, oriented to situation, CN II-XII grossly intact.  ABSENT: 

motor sensory deficit


Psychiatric exam: PRESENT: appropriate affect, normal mood.  ABSENT: homicidal 

ideation, suicidal ideation


Skin exam: PRESENT: dry, intact, warm.  ABSENT: cyanosis, rash





Results


Laboratory Results: 


                                        





                                 20 15:25 





                                 20 15:25 





                                        











  20





  15:25 15:25 19:45


 


WBC  4.3  


 


RBC  4.19  


 


Hgb  13.2  


 


Hct  40.2  


 


MCV  96  


 


MCH  31.4  


 


MCHC  32.8  


 


RDW  16.5 H  


 


Plt Count  173  


 


Seg Neutrophils %  59.9  


 


Sodium   142.3 


 


Potassium   4.2 


 


Chloride   105 


 


Carbon Dioxide   24 


 


Anion Gap   13 


 


BUN   19 


 


Creatinine   1.05 


 


Est GFR ( Amer)   > 60 


 


Glucose   65 L 


 


Calcium   10.1 


 


Magnesium   2.2 


 


Total Bilirubin   0.6 


 


AST   25 


 


Alkaline Phosphatase   124 


 


Total Protein   8.1 


 


Albumin   5.0 


 


Urine Color    YELLOW


 


Urine Appearance    CLEAR


 


Urine pH    5.0


 


Ur Specific Gravity    1.010


 


Urine Protein    NEGATIVE


 


Urine Glucose (UA)    NEGATIVE


 


Urine Ketones    NEGATIVE


 


Urine Blood    SMALL H


 


Urine Nitrite    NEGATIVE


 


Ur Leukocyte Esterase    TRACE H


 


Urine WBC (Auto)    2


 


Urine RBC (Auto)    1








                                        











  20





  15:25 20:39 05:29


 


Creatine Kinase    34


 


Troponin I  < 0.012  0.022 














  20





  05:29


 


Creatine Kinase 


 


Troponin I  0.012











Impressions: 


                                        





Cervical Spine CT  20 13:37


IMPRESSION:  CHRONIC DEGENERATIVE CHANGES.  NO ACUTE FINDINGS.


 








Chest X-Ray  20 13:37


IMPRESSION:  NO ACUTE RADIOGRAPHIC FINDING IN THE CHEST.


 








Head CT  20 13:37


IMPRESSION:  MILD CHRONIC CHANGES OF ATROPHY AND MICROVASCULAR ISCHEMIA.  NO 

ACUTE PROCESS.


EVIDENCE OF ACUTE STROKE: NO.


 








Shoulder X-Ray  20 13:37


IMPRESSION:  Degenerative changes most marked in the glenohumeral joint.  Humera

l head sits I am position.  This may represent a chronic rotator cuff injury.


 








Chest CT  20 00:00


IMPRESSION:  1.  Stable scarring in the left upper lobe.


2.  There now appears to be a slight nodular thickening of the nearby fissure as

described.  Consider PET-CT.


 














Assessment & Plan





- Diagnosis


(1) Uncontrolled hypertension


Is this a current diagnosis for this admission?: Yes   


Plan: 


Patient was a noncompliance not taking the medications for 3 weeks currently all

stable getting improving








(2) Headache


Qualifiers: 


   Headache type: unspecified   Headache chronicity pattern: episodic headache  

Intractability: not intractable   Qualified Code(s): R51.9 - Headache, 

unspecified   


Is this a current diagnosis for this admission?: Yes   


Plan: 


Patient CT of the head is negative's currently all resolved due to the 

uncontrolled hypertension's








(3) HTN (hypertension)


Qualifiers: 


   Hypertension type: essential hypertension   Qualified Code(s): I10 - 

Essential (primary) hypertension   


Is this a current diagnosis for this admission?: Yes   





(4) Coronary artery disease


Qualifiers: 


   Coronary Disease-Associated Artery/Lesion type: unspecified vessel or lesion 

type   Associated angina: without angina 


Is this a current diagnosis for this admission?: Yes   


Plan: 


We consulted cardiology for further evaluations








(5) AICD (automatic cardioverter/defibrillator) present


Is this a current diagnosis for this admission?: Yes   


Plan: 


We will get the pacemaker interrogations consult the cardiology








(6) COPD (chronic obstructive pulmonary disease)


Qualifiers: 


   COPD type: chronic bronchitis   Qualified Code(s): J44.9 - Chronic 

obstructive pulmonary disease, unspecified   


Is this a current diagnosis for this admission?: Yes   


Plan: 


Continues on nebulizer treatments








(7) HLD (hyperlipidemia)


Qualifiers: 


   Hyperlipidemia type: unspecified   Qualified Code(s): E78.5 - Hyperlipidemia,

unspecified   


Is this a current diagnosis for this admission?: Yes   





(8) Frequent falls


Is this a current diagnosis for this admission?: Yes   


Plan: 


 we will get the physical therapy evaluations


Patient have extensive work-up done in the past including the cardiology and 

neurology ENT


We will get the pacemaker check


Orthostatic vital signs








(9) Lung cancer


Qualifiers: 


   Laterality: unspecified laterality 


Is this a current diagnosis for this admission?: Yes   


Plan: 


Get the CT of the chest consult oncology patient is a very noncompliance did not

follow the oncology regularly have a history of the Cyber[employer]ife to the [facility]








(10) CVA (cerebral vascular accident)


Qualifiers: 


   CVA mechanism: unspecified   Qualified Code(s): I63.9 - Cerebral infarction, 

unspecified   


Is this a current diagnosis for this admission?: No   


Plan: 


Continues to aspirin statin








(11) Smoker


Is this a current diagnosis for this admission?: Yes   


Plan: 


So many times discussed with the patient and counseling about the smoking 

patients normally never follow advice still continues to smoke








(12) Noncompliance


Is this a current diagnosis for this admission?: Yes   


Plan: 


Again discussed with the patient about the compliance patient understand very 

well discussed with the patient's family including the daughter and the son








(13) Major depression


Qualifiers: 


   Major depression recurrence: recurrent   Major depression episode severity: 

moderate 


Is this a current diagnosis for this admission?: Yes   


Plan: 


Continues on Lexapro 10 mg p.o. daily








- Time


Time Spent: 50 to 70 Minutes


Medications reviewed and adjusted accordingly: Yes


Anticipated Discharge Disposition: Home with Home Health


Anticipated Discharge Timeframe: within 48 hours





- Inpatient Certification


Based on my medical assessment, after consideration of the patient's 

comorbidities, presenting symptoms, or acuity I expect that the services needed 

warrant INPATIENT care.: Yes


I certify that my determination is in accordance with my understanding of 

Medicare's requirements for reasonable and necessary INPATIENT services [42 CFR 

412.3e].: Yes


Medical Necessity: Failure to Improve With Outpatient Therapy, Significant 

Comorbidiites Make Outpatient Treatment Too Risky, Need For Continuous Telemetry

Monitoring, Need for Nebulizer Therapy and Monitoring of Response


Post Hospital Care: D/C Planner Documentation





- Plan Summary


Plan Summary: 





See MD orders

## 2020-11-06 VITALS — SYSTOLIC BLOOD PRESSURE: 147 MMHG | DIASTOLIC BLOOD PRESSURE: 57 MMHG

## 2020-11-06 RX ADMIN — AMLODIPINE BESYLATE SCH MG: 5 TABLET ORAL at 09:11

## 2020-11-06 RX ADMIN — CARVEDILOL SCH MG: 6.25 TABLET, FILM COATED ORAL at 09:11

## 2020-11-06 RX ADMIN — FAMOTIDINE SCH MG: 20 TABLET, FILM COATED ORAL at 09:11

## 2020-11-06 NOTE — PDOC DISCHARGE SUMMARY
Impression





- Admit/DC Date/PCP


Admission Date/Primary Care Provider: 


  20 23:47





  WILL VARGHESE





Discharge Date: 20





- Discharge Diagnosis


(1) Uncontrolled hypertension


Is this a current diagnosis for this admission?: Yes   





(2) Headache


Is this a current diagnosis for this admission?: Yes   





(3) HTN (hypertension)


Is this a current diagnosis for this admission?: Yes   





(4) Coronary artery disease


Is this a current diagnosis for this admission?: Yes   





(5) AICD (automatic cardioverter/defibrillator) present


Is this a current diagnosis for this admission?: Yes   





(6) COPD (chronic obstructive pulmonary disease)


Is this a current diagnosis for this admission?: Yes   





(7) HLD (hyperlipidemia)


Is this a current diagnosis for this admission?: Yes   





(8) Frequent falls


Is this a current diagnosis for this admission?: Yes   





(9) Lung cancer


Is this a current diagnosis for this admission?: Yes   





(10) CVA (cerebral vascular accident)


Is this a current diagnosis for this admission?: No   





(11) Smoker


Is this a current diagnosis for this admission?: Yes   





(12) Noncompliance


Is this a current diagnosis for this admission?: Yes   





(13) Major depression


Is this a current diagnosis for this admission?: Yes   





- Additional Information


Resuscitation Status: Full Code


Discharge Diet: Cardiac


Discharge Activity: Activity As Tolerated


Referrals: 


LAURA BENSON PA [Primary Care Provider] - 20 9:15 am


TABATHA MEMBRENO MD [ACTIVE STAFF] - 20 12:45 pm


MARISOL MOHAN MD [ACTIVE STAFF] - 21 2:00 pm


Home Medications: 








Amlodipine Besylate [Norvasc 5 mg Tablet] 5 mg PO Q12 18 


Aspirin [Aspirin EC] 81 mg PO DAILY 18 


Atorvastatin Calcium [Lipitor 40 mg Tablet] 40 mg PO QHS 18 


Isosorbide Mononitrate [Imdur 60 mg Tablet.er] 60 mg PO QAM 18 


Nitroglycerin [Nitrostat 0.4 mg (1/150 Gr) Tabs 25/Bottle] 1 tab SL Q5MP PRN 

12/10/18 


Clopidogrel Bisulfate [Plavix 75 mg Tablet] 75 mg PO DAILY 19 


Carvedilol [Coreg 12.5 mg Tablet] 12.5 mg PO Q12 20 


Cholecalciferol (Vitamin D3) [Vitamin D3 1000 Unit Tablet] 2,000 unit PO DAILY 

20 


Famotidine [Pepcid 20 mg Tablet] 20 mg PO QHS 20 


Fluticasone/Umeclidin/Vilanter [Trelegy 100-62.5-25 Mcg Ellipta 14 Dose/Dpi] 1 

puff IH DAILY 20 


Latanoprost/Pf [Latanoprost 0.005% Eye Drop] 1 drop OU QHS 20 


Pantoprazole Sodium [Protonix 40 mg Dr Tablet] 40 mg PO DAILY 20 











History of Present Illiness


History of Present Illness: 


JAYCEE BOURGEOIS BLOCKER is a 75 year old female


Is a 75-year-old female's with a significant history of the hypertension 

hyperlipidemia chronic smoker COPD lung cancer status post CyberKnife history of

the pacemaker coronary artery disease and a super noncomplianceRecently lost her

 in the last 3 weeks patient was not taking any single medications came 

to the office yesterday and patient's blood pressure was 230/120 and patient was

sent to the emergency department


In the ER patient was CT of the head was negative patient have a CT of the C-

spine chest x-ray and blood work was all stable


Received a 1 dose of clonidine and blood pressure is coming down to 150 range 

patient also received the amlodipine


Today's when I saw the patient's denied any chest pain no short of breath no 

headache no weakness


Again discussed with the patient's daughter regarding the patient's current 

conditions she knew that the patient is not follow nothing much she can offer if

the patient do not want to do anything


Discussed with the patient again myself and suggest that compliance of the 

medications


Patient CT of the chest was done was all stable we consult the oncology


We consult the cardiology for the pacemaker interrogations and further evaluate 

for this ongoing dizziness issue which patients follow cardiology at [facility] 

patient also seen by neurology patient seen by the ENT and has several work-up 

done





Hospital Course


Hospital Course: 


Is a 75-year-old female's basically presents in the office with the blood 

pressure was 230/130


Sent to the emergency department and all initial work-up including CT head CT of

the C-spine cardiac enzymes blood work is all normal


Patient stopped the medicines for 3 weeks by herself due to the her  


Always noncompliance start drinking alcohol start smoking again


Patient at this point admitting in the hospital patient seen by the cardiology 

pacemaker was interrogated


Have a CT of the chest done seen by oncology for lung cancer all stable


Patient's walk with the physical therapy without any problem patient's blood 

pressures running 1 30-1 40 range


Patient have orthostatic blood pressure was when he laid down is low when he 

stands up is high could be a reason symptom patient's fall


Discussed with the patient about slowly stand up and continues to monitor the 

blood pressures readjust the medications in the office follow with the 

cardiology


This with the patient about smoking counseling and alcohol counseling


Patient's according to the cardiology no need for stress test and echocardiogram


Oncology currently all stable following a 6-month


Discussed with the patient and the family and about the compliance continues to 

follow





Physical Exam


Vital Signs: 


                                        











Temp Pulse Resp BP Pulse Ox


 


 97.3 F   66   16   160/76 H  99 


 


 20 10:00  20 09:04  20 09:04  20 07:37  20 07:37








                                 Intake & Output











 20





 06:59 06:59 06:59


 


Intake Total 300 720 


 


Balance 300 720 


 


Weight 62.6 kg 62.6 kg 











General appearance: PRESENT: no acute distress, well-developed, well-nourished


Head exam: PRESENT: atraumatic, normocephalic


Eye exam: PRESENT: conjunctiva pink, EOMI, PERRLA.  ABSENT: scleral icterus


Ear exam: PRESENT: normal external ear exam


Mouth exam: PRESENT: moist, tongue midline


Neck exam: ABSENT: carotid bruit, JVD, lymphadenopathy, thyromegaly


Respiratory exam: PRESENT: clear to auscultation ximena.  ABSENT: rales, rhonchi, 

wheezes


Cardiovascular exam: PRESENT: RRR.  ABSENT: diastolic murmur, rubs, systolic 

murmur


Pulses: PRESENT: normal dorsalis pedis pul


Vascular exam: PRESENT: normal capillary refill


GI/Abdominal exam: PRESENT: normal bowel sounds, soft.  ABSENT: distended, 

guarding, mass, organolmegaly, rebound, tenderness


Rectal exam: PRESENT: deferred


Extremities exam: PRESENT: full ROM.  ABSENT: calf tenderness, clubbing, pedal 

edema


Neurological exam: PRESENT: alert, awake, oriented to person, oriented to place,

oriented to time, oriented to situation, CN II-XII grossly intact.  ABSENT: 

motor sensory deficit


Psychiatric exam: PRESENT: appropriate affect, normal mood.  ABSENT: homicidal 

ideation, suicidal ideation


Skin exam: PRESENT: dry, intact, warm.  ABSENT: cyanosis, rash





Results


Laboratory Results: 


                                        











WBC  4.3 10^3/uL (4.0-10.5)   20  15:25    


 


RBC  4.19 10^6/uL (3.72-5.28)   20  15:25    


 


Hgb  13.2 g/dL (12.0-15.5)   20  15:25    


 


Hct  40.2 % (36.0-47.0)   20  15:25    


 


MCV  96 fl (80-97)   20  15:25    


 


MCH  31.4 pg (27.0-33.4)   20  15:25    


 


MCHC  32.8 g/dL (32.0-36.0)   20  15:25    


 


RDW  16.5 % (11.5-14.0)  H  20  15:25    


 


Plt Count  173 10^3/uL (150-450)   20  15:25    


 


Lymph % (Auto)  36.1 % (13-45)   20  15:25    


 


Mono % (Auto)  3.2 % (3-13)   20  15:25    


 


Eos % (Auto)  0.6 % (0-6)   20  15:25    


 


Baso % (Auto)  0.2 % (0-2)   20  15:25    


 


Absolute Neuts (auto)  2.6 10^3/uL (1.7-8.2)   20  15:25    


 


Absolute Lymphs (auto)  1.6 10^3/uL (0.5-4.7)   20  15:25    


 


Absolute Monos (auto)  0.1 10^3/uL (0.1-1.4)   20  15:25    


 


Absolute Eos (auto)  0.0 10^3/uL (0.0-0.6)   20  15:25    


 


Absolute Basos (auto)  0.0 10^3/uL (0.0-0.2)   20  15:25    


 


Seg Neutrophils %  59.9 % (42-78)   20  15:25    


 


Sodium  142.3 mmol/L (137-145)   20  15:25    


 


Potassium  4.2 mmol/L (3.6-5.0)   20  15:25    


 


Chloride  105 mmol/L ()   20  15:25    


 


Carbon Dioxide  24 mmol/L (22-30)   20  15:25    


 


Anion Gap  13  (5-19)   20  15:25    


 


BUN  19 mg/dL (7-20)   20  15:25    


 


Creatinine  1.05 mg/dL (0.52-1.25)   20  15:25    


 


Est GFR ( Amer)  > 60  (>60)   20  15:25    


 


Est GFR (MDRD) Non-Af  51  (>60)  L  20  15:25    


 


Glucose  65 mg/dL ()  L  20  15:25    


 


POC Glucose  95 mg/dL ()   20  16:48    


 


Calcium  10.1 mg/dL (8.4-10.2)   20  15:25    


 


Magnesium  2.2 mg/dL (1.6-2.3)   20  15:25    


 


Total Bilirubin  0.6 mg/dL (0.2-1.3)   20  15:25    


 


Direct Bilirubin  0.1 mg/dL (0.0-0.4)   20  15:25    


 


Neonat Total Bilirubin  Not Reportable   20  15:25    


 


Neonat Direct Bilirubin  Not Reportable   20  15:25    


 


Neonat Indirect Bili  Not Reportable   20  15:25    


 


AST  25 U/L (14-36)   20  15:25    


 


ALT  13 U/L (<35)   20  15:25    


 


Alkaline Phosphatase  124 U/L ()   20  15:25    


 


Creatine Kinase  34 U/L ()   20  05:29    


 


Troponin I  0.012 ng/mL  20  05:29    


 


Total Protein  8.1 g/dL (6.3-8.2)   20  15:25    


 


Albumin  5.0 g/dL (3.5-5.0)   20  15:25    


 


Urine Color  YELLOW   20  19:45    


 


Urine Appearance  CLEAR   20  19:45    


 


Urine pH  5.0  (5.0-9.0)   20  19:45    


 


Ur Specific Gravity  1.010   20  19:45    


 


Urine Protein  NEGATIVE mg/dL (NEGATIVE)   20  19:45    


 


Urine Glucose (UA)  NEGATIVE mg/dL (NEGATIVE)   20  19:45    


 


Urine Ketones  NEGATIVE mg/dL (NEGATIVE)   20  19:45    


 


Urine Blood  SMALL  (NEGATIVE)  H  20  19:45    


 


Urine Nitrite  NEGATIVE  (NEGATIVE)   20  19:45    


 


Urine Bilirubin  NEGATIVE  (NEGATIVE)   20  19:45    


 


Urine Urobilinogen  NEGATIVE mg/dL (<2.0)   20  19:45    


 


Ur Leukocyte Esterase  TRACE  (NEGATIVE)  H  20  19:45    


 


Urine WBC (Auto)  2 /HPF  20  19:45    


 


Urine RBC (Auto)  1 /HPF  20  19:45    


 


Squamous Epi Cells Auto  <1 /HPF  20  19:45    


 


Urine Mucus (Auto)  RARE /LPF  20  19:45    


 


Urine Ascorbic Acid  NEGATIVE  (NEGATIVE)   20  19:45    








                                        











  20





  15:25 20:39 05:29


 


Troponin I  < 0.012  0.022  0.012











Impressions: 


                                        





Cervical Spine CT  20 13:37


IMPRESSION:  CHRONIC DEGENERATIVE CHANGES.  NO ACUTE FINDINGS.


 








Chest X-Ray  20 13:37


IMPRESSION:  NO ACUTE RADIOGRAPHIC FINDING IN THE CHEST.


 








Head CT  20 13:37


IMPRESSION:  MILD CHRONIC CHANGES OF ATROPHY AND MICROVASCULAR ISCHEMIA.  NO 

ACUTE PROCESS.


EVIDENCE OF ACUTE STROKE: NO.


 








Shoulder X-Ray  20 13:37


IMPRESSION:  Degenerative changes most marked in the glenohumeral joint.  

Humeral head sits I am position.  This may represent a chronic rotator cuff 

injury.


 








Chest CT  20 00:00


IMPRESSION:  1.  Stable scarring in the left upper lobe.


2.  There now appears to be a slight nodular thickening of the nearby fissure as

described.  Consider PET-CT.


 














Plan


Time Spent: Greater than 30 Minutes - Follow in office 1 week readjust the blood

pressure medications check of blood pressure at home daily Arranged home health 

and physical therapy due to the noncompliance





Stroke


Is this a Stroke Patient?: No





Acute Heart Failure


Is this a Heart Failure Patient?: No

## 2020-11-06 NOTE — PDOC CONSULTATION
Consultation


Consult Date: 20


Attending physician:: BISMARK CABALLERO


Provider Consulted: MARISOL MOHAN


Consult reason:: None stage I lung cancer here with recent CT for restaging





History of Present Illness


Admission Date/PCP: 


  20 23:47





  WILL VARGHESE





Patient complains of: Weakness, shortness of breath


History of Present Illness: 


JAYCEE FLOWERS is a 75 year old female with longstanding history of 

noncompliance.  Mostly as a dementia issue, she really does not remember to do 

much.  Unfortunately recently her  passed away.  Family is involved with 

her care to some extent.  She apparently was found at home confused had not 

taken medications for months.  She was brought to her PCP where she was found to

be in hypertensive emergency.  She has known history of stage I lung cancer, and

with great difficulty in getting transportation we were able to diagnose her and

treat her at [place] with CyberKnife radiation.  Since that time she is 

remained in a complete response.  On this admission she had restaging CT, which 

is good because she missed her appointment with us multiple times for restaging.

 The CT per my view shows stability of changes post radiation.  No new areas of 

disease.








Past Medical History


Cardiac Medical History: Reports: Congestive Heart Failure, Coronary Artery 

Disease, Myocardial Infarction - , Hyperlipidema, Hypertension - ON MEDS


   Denies: Atrial Fibrillation, Peripheral Vascular Disease, Pulmonary Embolism,

Heart Murmur


Pulmonary Medical History: Reports: Bronchitis, Chronic Obstructive Pulmonary 

Disease (COPD), Pneumonia - YRS AGO


   Denies: Asthma, Respiratory Failure, Sleep Apnea, Tuberculosis


Neurological Medical History: 


   Denies: Seizures


Endocrine Medical History: 


   Denies: Diabetes Mellitus Type 1, Diabetes Mellitus Type 2


Renal/ Medical History: 


   Denies: End Stage Renal Disease


Malignancy Medical History: Reports: Lung Cancer


   Denies: Leukemia


GI Medical History: Reports: Crohn's Disease - Patient has either Crohn's 

disease or ulcerative colitis, Diverticulitis, Gastroesophageal Reflux Disease


   Denies: Hepatitis, Hiatal Hernia


Musculoskeltal Medical History: Reports: Arthritis


   Denies: Fibromyalgia


Psychiatric Medical History: Reports: Depression


   Denies: Bipolar Disorder, Post Traumatic Stress Disorder


Hematology: 


   Denies: Anemia, Hemophilia, Sickle Cell Disease


Infectious Medical History: 


   Denies: HIV





Past Surgical History


Past Surgical History: Reports: Appendectomy, Cardiac Catheterization,  

Section - x4, Coronary Stent, Hysterectomy, Orthopedic Surgery - bilateral knee 

surgery, Pacemaker, Tonsillectomy


   Denies: Amputation, Cholecystectomy, Colostomy, Coronary Artery Bypass Graft,

Gastric Bypass Surgery, Herniorrhaphy, Mastectomy, Tubal Ligation





Social History


Information Source: Patient


Smoking Status: Current Every Day Smoker


Cigarettes Packs Per Day: 1


Electronic Cigarette use?: No


Number of Years Smokin


Last Time Smoked: 2 days ago


Frequency of Alcohol Use: None


Hx Recreational Drug Use: No


Drugs: None


Hx Prescription Drug Abuse: No





- Advance Directive


Resuscitation Status: Full Code





Family History


Family History: Reviewed & Not Pertinent, Hypertension


Parental Family History Reviewed: Yes


Children Family History Reviewed: Yes


Sibling(s) Family History Reviewed.: Yes





Medication/Allergy


Home Medications: 








Amlodipine Besylate [Norvasc 5 mg Tablet] 5 mg PO Q12 18 


Aspirin [Aspirin EC] 81 mg PO DAILY 18 


Atorvastatin Calcium [Lipitor 40 mg Tablet] 40 mg PO QHS 18 


Escitalopram Oxalate [Lexapro 10 mg Tablet] 10 mg PO DAILY 18 


Isosorbide Mononitrate [Imdur 60 mg Tablet.er] 60 mg PO QAM 18 


Nitroglycerin [Nitrostat 0.4 mg (1/150 Gr) Tabs 25/Bottle] 1 tab SL Q5MP PRN 

12/10/18 


Clopidogrel Bisulfate [Plavix 75 mg Tablet] 75 mg PO DAILY 19 


Carvedilol [Coreg 12.5 mg Tablet] 12.5 mg PO Q12 20 


Cholecalciferol (Vitamin D3) [Vitamin D3 1000 Unit Tablet] 2,000 unit PO DAILY 

20 


Famotidine [Pepcid 20 mg Tablet] 20 mg PO QHS 20 


Fluticasone/Umeclidin/Vilanter [Trelegy 100-62.5-25 Mcg Ellipta 14 Dose/Dpi] 1 

puff IH DAILY 20 


Latanoprost/Pf [Latanoprost 0.005% Eye Drop] 1 drop OU QHS 20 


Pantoprazole Sodium [Protonix 40 mg Dr Tablet] 40 mg PO DAILY 20 








Allergies/Adverse Reactions: 


                                        





amoxicillin Allergy (Verified 20 09:13)


   


Penicillins Allergy (Verified 20 09:13)


   











Review of Systems


Constitutional: ABSENT: chills, fever(s), headache(s), weight gain, weight loss


Eyes: ABSENT: visual disturbances


Ears: ABSENT: hearing changes


Cardiovascular: ABSENT: chest pain, dyspnea on exertion, edema, orthropnea, 

palpitations


Respiratory: ABSENT: cough, hemoptysis


Gastrointestinal: ABSENT: abdominal pain, constipation, diarrhea, hematemesis, 

hematochezia, nausea, vomiting


Genitourinary: ABSENT: dysuria, hematuria


Musculoskeletal: ABSENT: joint swelling


Integumentary: ABSENT: rash, wounds


Neurological: ABSENT: abnormal gait, abnormal speech, confusion, dizziness, 

focal weakness, syncope


Psychiatric: ABSENT: anxiety, depression, homidical ideation, suicidal ideation


Endocrine: ABSENT: cold intolerance, heat intolerance, polydipsia, polyuria


Hematologic/Lymphatic: ABSENT: easy bleeding, easy bruising





Physical Exam


Vital Signs: 


                                        











Temp Pulse Resp BP Pulse Ox


 


 98.0 F   74   16   140/58 H  94 


 


 20 23:33  20 02:00  20 23:33  20 23:33  20 23:33








                                 Intake & Output











 20





 06:59 06:59 06:59


 


Intake Total 300 720 


 


Balance 300 720 


 


Weight 62.6 kg 62.6 kg 











General appearance: PRESENT: no acute distress, well-developed, well-nourished


Head exam: PRESENT: atraumatic, normocephalic


Eye exam: PRESENT: conjunctiva pink, EOMI, PERRLA.  ABSENT: scleral icterus


Ear exam: PRESENT: normal external ear exam


Mouth exam: PRESENT: moist, tongue midline


Neck exam: ABSENT: carotid bruit, JVD, lymphadenopathy, thyromegaly


Respiratory exam: PRESENT: clear to auscultation ximena.  ABSENT: rales, rhonchi, 

wheezes


Cardiovascular exam: PRESENT: RRR.  ABSENT: diastolic murmur, rubs, systolic 

murmur


Pulses: PRESENT: normal dorsalis pedis pul


Vascular exam: PRESENT: normal capillary refill


GI/Abdominal exam: PRESENT: normal bowel sounds, soft.  ABSENT: distended, 

guarding, mass, organolmegaly, rebound, tenderness


Rectal exam: PRESENT: deferred


Extremities exam: PRESENT: full ROM.  ABSENT: calf tenderness, clubbing, pedal 

edema


Neurological exam: PRESENT: alert, awake, oriented to person, oriented to place,

oriented to time, oriented to situation, CN II-XII grossly intact.  ABSENT: 

motor sensory deficit


Psychiatric exam: PRESENT: appropriate affect, normal mood.  ABSENT: homicidal 

ideation, suicidal ideation


Skin exam: PRESENT: dry, intact, warm.  ABSENT: cyanosis, rash





Results


Laboratory Results: 


                                        





                                 20 15:25 





                                 20 15:25 





                                        











  20





  15:25 20:39 05:29


 


Creatine Kinase    34


 


Troponin I  < 0.012  0.022 














  20





  05:29


 


Creatine Kinase 


 


Troponin I  0.012











Impressions: 


                                        





Cervical Spine CT  20 13:37


IMPRESSION:  CHRONIC DEGENERATIVE CHANGES.  NO ACUTE FINDINGS.


 








Chest X-Ray  20 13:37


IMPRESSION:  NO ACUTE RADIOGRAPHIC FINDING IN THE CHEST.


 








Head CT  20 13:37


IMPRESSION:  MILD CHRONIC CHANGES OF ATROPHY AND MICROVASCULAR ISCHEMIA.  NO 

ACUTE PROCESS.


EVIDENCE OF ACUTE STROKE: NO.


 








Shoulder X-Ray  20 13:37


IMPRESSION:  Degenerative changes most marked in the glenohumeral joint.  

Humeral head sits I am position.  This may represent a chronic rotator cuff 

injury.


 








Chest CT  20 00:00


IMPRESSION:  1.  Stable scarring in the left upper lobe.


2.  There now appears to be a slight nodular thickening of the nearby fissure as

described.  Consider PET-CT.


 











Status: Image reviewed by me





Assessment & Plan





- Diagnosis


(1) Lung cancer


Qualifiers: 


   Laterality: left   Lung location: upper lobe of lung   Qualified Code(s): 

C34.12 - Malignant neoplasm of upper lobe, left bronchus or lung   


Is this a current diagnosis for this admission?: Yes   


Plan: 


Stage I left lung cancer status post primary CyberKnife radiation, complete 

response noted, stable changes on CT, next CT should be in 6 months.  We will 

see her at that time.








- Time


Time Spent: Greater than 70 Minutes

## 2023-02-15 NOTE — ER DOCUMENT REPORT
Angie called wondering if we can fill out a DMV placard for her. She says she is running out off steam having to walk from the back of the parking lot.     Thank you!   ED Dizziness/Weakness





- General


Chief Complaint: Dizziness


Stated Complaint: DIZZY


Time Seen by Provider: 18 13:53


Mode of Arrival: Ambulatory


Information source: Patient


Notes: 





Patient is a 73-year-old female comes emergency room complaining of dizziness 

and falling down often over the past month.  Patient states that she has weak 

lower extremities and it when she goes to get in the car she has to use her 

right leg to lift her left leg up.  She has no energy or no strength in the 

gives out on her some.  She had a complaint of being dizzy but when he talked 

her about dizzy in the room does not spin and patient describes more as 

lightheaded every once in a while.  She has had falls using her cane.  Her 

primary care provider has written her prescription to get a cane she often 

drops that and falls.  She is told this to her daughters and her sons and today 

they called her and told her to quit cooking and go get it checked out in the 

ER.  She denies any loss of consciousness she denies chest pain or shortness of 

breath she denies having a poor diet.


TRAVEL OUTSIDE OF THE [place] IN LAST 30 DAYS: No





- HPI


Patient complains to provider of: Dizziness, Weakness


Onset: Other - 1 month


Onset/Duration: Gradual, Persistent, Worse


Quality of pain: Achy, Pressure, Throbbing


Severity: Moderate


Pain Level: 5


Associated symptoms: None


Exacerbated by: denies: Change in position, Movement of head


Baseline gait: Walks w/o assistance, Uses a cane





- Related Data


Allergies/Adverse Reactions: 


 





No Known Allergies Allergy (Verified 18 13:24)


 











Past Medical History





- General


Information source: Patient





- Social History


Smoking Status: Current Every Day Smoker


Cigarette use (# per day): Yes - 5-8 cigarettes a day


Chew tobacco use (# tins/day): No


Smoking Education Provided: Yes


Frequency of alcohol use: None


Drug Abuse: None


Family History: Reviewed & Not Pertinent, Hypertension


Patient has suicidal ideation: No


Patient has homicidal ideation: No





- Past Medical History


Cardiac Medical History: Reports: Hx Congestive Heart Failure, Hx Coronary 

Artery Disease, Hx Heart Attack - , Hx Hypercholesterolemia, Hx Hypertension


   Denies: Hx Atrial Fibrillation, Hx Peripheral Vascular Disease, Hx Pulmonary 

Embolism, Hx Heart Murmur


Pulmonary Medical History: Reports: Hx Bronchitis, Hx COPD, Hx Pneumonia


   Denies: Hx Asthma, Hx Respiratory Failure, Hx Sleep Apnea, Hx Tuberculosis


Neurological Medical History: Denies: Hx Cerebrovascular Accident, Hx Seizures


Endocrine Medical History: Denies: Hx Diabetes Mellitus Type 1, Hx Diabetes 

Mellitus Type 2


Renal/ Medical History: Reports: Hx Renal Insufficiency.  Denies: Hx End 

Stage Renal Disease, Hx Kidney Stones, Hx Peritoneal Dialysis


Malignancy Medical History: Denies: Hx Leukemia, Hx Lung Cancer


GI Medical History: Reports: Hx Crohn's Disease - Patient has either Crohn's 

disease or ulcerative colitis, Hx Diverticulitis, Hx Gastroesophageal Reflux 

Disease.  Denies: Hx Hepatitis, Hx Hiatal Hernia, Hx Irritable Bowel, Hx Liver 

Failure, Hx Pancreatitis, Hx Ulcer


Musculoskeletal Medical History: Reports Hx Arthritis, Denies Hx Fibromyalgia, 

Denies Hx Muscular Dystrophy


Psychiatric Medical History: Reports: Hx Depression


   Denies: Hx Bipolar Disorder, Hx Post Traumatic Stress Disorder, Hx 

Schizophrenia


Traumatic Medical History: Reports: Hx Fractures - right wrist, left foot


Infectious Medical History: Denies: Hx Hepatitis, Hx HIV


Past Surgical History: Reports: Hx Appendectomy, Hx Cardiac Catheterization, Hx 

Cardiac Surgery - AICD for ventricular fibrillation, Hx  Section - x4, 

Hx Coronary Stent, Hx Hysterectomy, Hx Orthopedic Surgery - bilateral knee 

surgery, Hx Pacemaker, Hx Tonsillectomy.  Denies: Hx Bowel Surgery, Hx 

Cholecystectomy, Hx Colostomy, Hx Coronary Artery Bypass Graft, Hx Gastric 

Bypass Surgery, Hx Herniorrhaphy, Hx Mastectomy, Hx Open Heart Surgery, Hx 

Tubal Ligation





- Immunizations


Immunizations up to date: Yes


Hx Diphtheria, Pertussis, Tetanus Vaccination: Yes


Hx Pneumococcal Vaccination: 01/01/10





Review of Systems





- Review of Systems


Constitutional: No symptoms reported


EENT: No symptoms reported


Cardiovascular: No symptoms reported, Dizziness - Is, Lightheaded


Respiratory: No symptoms reported


Gastrointestinal: No symptoms reported


Genitourinary: No symptoms reported


Female Genitourinary: No symptoms reported


Musculoskeletal: See HPI, Joint pain, Muscle pain - Surgery


Skin: No symptoms reported


Hematologic/Lymphatic: No symptoms reported


Neurological/Psychological: No symptoms reported


-: Yes All other systems reviewed and negative





Physical Exam





- Vital signs


Vitals: 


 











Temp Pulse Resp BP Pulse Ox


 


 97.9 F   59 L  18   165/74 H  97 


 


 18 13:39  18 13:39  18 13:39  18 13:39  18 13:39











Interpretation: Hypertensive





- Notes


Notes: 





PHYSICAL EXAMINATION:





GENERAL: Patient is a well-nourished well-developed 73-year-old female who is 

in no apparent distress on examination.





HEAD: Atraumatic, normocephalic.





EYES: Pupils equal round and reactive to light, extraocular movements intact, 

conjunctiva are normal.





ENT: Nares patent, oropharynx clear without exudates.  Moist mucous membranes.





NECK: Normal range of motion, supple without lymphadenopathy





LUNGS: Breath sounds clear to auscultation bilaterally and equal.  No wheezes 

rales or rhonchi.





HEART: Regular rate and rhythm without murmurs





ABDOMEN: Soft, nontender, nondistended abdomen.  No guarding, no rebound.  No 

masses appreciated.





Female : deferred





Musculoskeletal: Normal range of motion, no pitting or edema.  No cyanosis.





NEUROLOGICAL  Normal speech, normal gait.  Normal sensory, motor exams





PSYCH: Normal mood, normal affect.





SKIN: Warm, Dry, normal turgor, no rashes or lesions noted.





Course





- Re-evaluation


Re-evalutation: 





18 19:12


Patient basically came in with a complaint of having falls more frequently.  We 

tested her with the NIH scale which she had a 0 we tested her with walking 

around the building with a pulse ox on which she was 97 and we did strength 

testing in the room with the lower extremities and when she went to get up off 

the bed it was like her right knee her hip gave out a little bit.  We did EKGs 

that show that she has basically a sinus rhythm there is no change since 

January of this year and the rate in January was 51 and today it was 57.  So 

that this is an arrhythmia she may be a little bradycardic but when she is up 

and ambulatory it comes up a little bit better.  Given the history of falls I 

had a sent her over for x-rays of her hip and knee those turned out to be 

negative as well.  I know this does not give us a hint of the ligaments but she 

does not appear to have ligament damage.  She has no movement or laxity in the 

right knee at all.  No crepitus in the hip with passive range of motion.  At 

this point I am going to recommend the patient go see her primary care doctor 

and possibly possibly get her into physical therapy and see how that goes.  The 

other thing I did do was I tested her for vertigo by later down and rotated her 

head right left and up she had no horizontal nystagmus she sat up she had no 

signs of dizziness.  We also were static turgor and those were perfectly normal 

so she was not dehydrated.  Currently I find her safe to leave the hospital.  I 

do not believe she said any type of a CVA she has the perfect NIH and she is 

got great personality and strength in her lower extremities.  But this point 

time am going to refer her back to her primary care provider for further 

evaluation and physical therapy.





- Vital Signs


Vital signs: 


 











Temp Pulse Resp BP Pulse Ox


 


 97.9 F   59 L  19   173/77 H  96 


 


 18 13:39  18 15:00  18 15:00  18 15:00  18 15:00














- Laboratory


Result Diagrams: 


 18 15:10





 18 15:10


Laboratory results interpreted by me: 


 











  18





  15:10 15:10


 


WBC  3.6 L 


 


Hgb  11.9 L 


 


Hct  35.7 L 


 


RDW  15.8 H 


 


BUN   27 H


 


Est GFR (Non-Af Amer)   50 L














Discharge





- Discharge


Clinical Impression: 


 Falls frequently





Accident due to mechanical fall without injury


Qualifiers:


 Encounter type: initial encounter Qualified Code(s): W19.XXXA - Unspecified 

fall, initial encounter





Condition: Stable


Disposition: HOME, SELF-CARE


Instructions:  Dizziness ([facility]), Weakness ([facility])


Additional Instructions: 


At this point I have done everything I can think of to check you out and see 

why you may be falling.  My assumption at this point is that you need some 

physical therapy.  I highly suggest you go back to your primary doctor in and 

see if he can get you some physical therapy to work on your balance and your 

strengthening of your lower extremities.  There is nothing fine neurologically 

wrong with you you are not dehydrated you do not have vertigo you brain shows 

to be normal for your age and there are no other abnormal findings on your hip x

-ray or your knee x-ray.  This does not occlude the fact that you may have some 

ligament damage in this may be something that needs to be examined however, you 

should continue to use your cane for support if not the cane and then a walker.

  We have tested you for dehydration for arrhythmias for blood work problems 

for a stroke.  We can find the cause of you having falls so you need further 

evaluation by a physical therapist to see if it is something related to your 

balance or your strength.  Meantime be careful think before you walk and make 

sure you walking with support.


Forms:  Elevated Blood Pressure


Referrals: 


BISMARK CABALLERO MD [Primary Care Provider] - Follow up as needed

## 2025-07-15 NOTE — RADIOLOGY REPORT (SQ)
EXAM DESCRIPTION:  CT HEAD WITHOUT



COMPLETED DATE/TIME:  6/25/2019 9:05 am



REASON FOR STUDY:  R42 DIZZINESS AND GIDDINESS R42  DIZZINESS AND GIDDINESS



COMPARISON:  15 prior CT brain exams, most recently 11/22/2018



TECHNIQUE:  Axial images acquired through the brain without intravenous contrast.  Images reviewed wi
th bone, brain and subdural windows.  Additional sagittal and coronal reconstructions were generated.
 Images stored on PACS.

All CT scanners at this facility use dose modulation, iterative reconstruction, and/or weight based d
osing when appropriate to reduce radiation dose to as low as reasonably achievable (ALARA).

[facility]: Dose Right  [facility]: CareDose    [facility]: Dose Right    [facility]: Teradose 4D    [facility]: Smart Technologies



RADIATION DOSE:  CT Rad equipment meets quality standard of care and radiation dose reduction techniq
ues were employed. CTDIvol: 48.7 mGy. DLP: 1004 mGy-cm. mGy.



LIMITATIONS:  None.



FINDINGS:  VENTRICLES: Normal size and contour.

CEREBRUM: No CT evidence of acute ischemic change, acute intracranial hemorrhage mass effect or midli
ne shift. Calcification in the posterior right frontal deep periventricular white matter is unchanged
 from studies dating back to 2007.  No significant white matter disease by CT.

CEREBELLUM: Old lacunar infarct right cerebellar hemisphere axial image 16.  No CT evidence of acute 
ischemic change, or posterior fossa hemorrhage mass effect or shift.

EXTRAAXIAL SPACES: No fluid collections.  No masses.

ORBITS AND GLOBE: No intra- or extraconal masses.  Normal contour of globe without masses.

CALVARIUM: No fracture.

PARANASAL SINUSES: No fluid or mucosal thickening.

SOFT TISSUES: No mass or hematoma.

OTHER: Heavily calcified [place] vessels.



IMPRESSION:  No acute findings

EVIDENCE OF ACUTE STROKE: NO.



COMMENT:  Quality ID # [id number]: Final reports with documentation of one or more dose reduction techniques
 (e.g., Automated exposure control, adjustment of the mA and/or kV according to patient size, use of 
iterative reconstruction technique)



TECHNICAL DOCUMENTATION:  JOB ID:  [id number]

 2011 [employer]- All Rights Reserved



Reading location - IP[facility]workstation name[email or web address]
Nausea & dizziness when eating x1 week worsening over the past 3 days.